# Patient Record
Sex: FEMALE | Race: BLACK OR AFRICAN AMERICAN | NOT HISPANIC OR LATINO | Employment: FULL TIME | URBAN - METROPOLITAN AREA
[De-identification: names, ages, dates, MRNs, and addresses within clinical notes are randomized per-mention and may not be internally consistent; named-entity substitution may affect disease eponyms.]

---

## 2017-01-23 ENCOUNTER — OFFICE VISIT (OUTPATIENT)
Dept: FAMILY MEDICINE | Facility: CLINIC | Age: 27
End: 2017-01-23

## 2017-01-23 VITALS
BODY MASS INDEX: 21.47 KG/M2 | SYSTOLIC BLOOD PRESSURE: 116 MMHG | OXYGEN SATURATION: 100 % | WEIGHT: 129 LBS | DIASTOLIC BLOOD PRESSURE: 78 MMHG | TEMPERATURE: 98.2 F | HEART RATE: 103 BPM

## 2017-01-23 DIAGNOSIS — Z30.42 ENCOUNTER FOR SURVEILLANCE OF INJECTABLE CONTRACEPTIVE: Primary | ICD-10-CM

## 2017-01-23 LAB — HCG UR QL: NEGATIVE

## 2017-01-23 NOTE — PROGRESS NOTES
Quick Note:    Negative UPT from 1/23/2017 were discussed with patient in clinic. Bre Ramachandran MD  ______

## 2017-01-23 NOTE — PROGRESS NOTES
SUBJECTIVE       Antonette Horton is a 26 year old  female with a PMH significant for:     Patient Active Problem List   Diagnosis     Depo-Provera contraceptive status     Recurrent urinary tract infection     Proteinuria     Retroflexion of uterus     She presents to discuss:    Depo shot: Late for Depo. Last Depo was in 2016. No unprotected intercourse in over 2 weeks. She has considered other options for contraception but knows she does not want the pill, Nexplanon, or IUD. She would like to continue the Depo shot for now, but is considering other options for the future. She has noted some spotting while on Depo but is not bothered by this. She has not had issues with weight gain.      For all, if Depo shots given initially or outside 90 days, patient should agree to use other contraception (e.g. condoms) or not have vaginal intercourse for the next 10 days.     FIRST DEPO PROVERA INJECTION  One of the following must apply to initiate Depo   1. Period is regular, and it started 5 or less days ago.  -- Record LMP.  -- UPT required.    2. Elective or spontaneous  5 or less days ago.  -- Record date of .    3. Currently taking OCPs and have been for >1 month without missing any pills.  -- UPT required.    4. Nexplanon is being removed today, and it has been in place for <5 years.    5. Gave birth 5 or less days ago and not breastfeeding.  -- Record date of birth.    6. Gave birth in the past 6 weeks and breastfeeding only (no supplemental feeding).  -- Record date of birth.  -- UPT required.    7. Gave birth or had an elective or spontaneous  less than 4 weeks ago but more than 5 days ago, and have NOT had vaginal intercourse since then.  ------------------------------------------------------------  If NONE of the statements above apply OR the urine pregnancy test is positive, Depo may not be given today.  -- Tell patient to return for first shot within 5 days of start of her next  period.     SUBSEQUENT DEPO PROVERA INJECTIONS   If 90 days or less since last injection     Depo may be given    If more than 90 days since last injection     Patient must have negative UPT  AND     Patient must NOT have had unprotected intercourse in previous 2 weeks.    -- If more than 90 days and unprotected intercourse in past two weeks, then Depo cannot be given.  Advise no unprotected intercourse until returning to clinic in 2 weeks for repeat UPT.       PMH, Medications and Allergies were reviewed and updated as needed.        REVIEW OF SYSTEMS     Pertinent positives and negatives noted in HPI.           OBJECTIVE     Filed Vitals:    01/23/17 1527   BP: 116/78   Pulse: 103   Temp: 98.2  F (36.8  C)   Weight: 129 lb (58.514 kg)   SpO2: 100%     Body mass index is 21.47 kg/(m^2).    General: Alert, NAD, normal weight  Psych: Pleasant mood, normal affect        No results found for this or any previous visit (from the past 24 hour(s)).        ASSESSMENT AND PLAN         Contraception: Patient would like to continue the Depo shot. Late for Depo.  No unprotected intercourse for over 2 weeks and UPT negative today which was discussed, so okay to give Depo today.  Called after clinic to remind patient to avoid unprotected intercourse for 10 days. Reminded to return within the 3 month window for next Depo shot.  Also discussed other options for birth control including pill, patch, Nuvaring, IUDs, and Nexplanon, and she will consider these in the future.   -     HCG Qualitative Urine (UPT)  (P FM)  -     medroxyPROGESTERone (DEPO-PROVERA) injection 150 mg (Charge)  -     INJECTION INTRAMUSCULAR OR SUB-Q      RTC in 3 months for next Depo shot or sooner if develops new or worsening symptoms.    Patient's plan of care was discussed with Dr. Rossi.    Bre Ramachandran MD PGY-3  Pager #: [phone]

## 2017-01-23 NOTE — NURSING NOTE
I administered the following to Antonette Horton.    MEDICATION: Depo Provera 150mg  ROUTE: IM  SITE: RUQ - Gluteus  DOSE: 1   LOT #: [id number]  :  [employer]   EXPIRATION DATE:  04/2020  NDC#: [address]-4  Next Depo: 4/10 - 4/24/17     Was entire vial of medication used? Yes    Name of provider who requested the injection: Dr Bre Ramachandran  Name of provider on site at the time of performing the injection: Dr Flo Ward, [facility]

## 2017-01-25 NOTE — PROGRESS NOTES
Preceptor attestation:  Patient seen and discussed with the resident.  Assessment and plan reviewed with resident and agreed upon.  Supervising physician: Abe Rossi  [facility]

## 2017-01-31 ENCOUNTER — OFFICE VISIT (OUTPATIENT)
Dept: FAMILY MEDICINE | Facility: CLINIC | Age: 27
End: 2017-01-31

## 2017-01-31 VITALS
WEIGHT: 131.4 LBS | BODY MASS INDEX: 21.89 KG/M2 | HEIGHT: 65 IN | HEART RATE: 84 BPM | TEMPERATURE: 98.2 F | DIASTOLIC BLOOD PRESSURE: 80 MMHG | SYSTOLIC BLOOD PRESSURE: 122 MMHG

## 2017-01-31 DIAGNOSIS — N89.8 VAGINAL IRRITATION: Primary | ICD-10-CM

## 2017-01-31 DIAGNOSIS — B37.31 YEAST INFECTION OF THE VAGINA: ICD-10-CM

## 2017-01-31 LAB
BACTERIA: NORMAL
CLUE CELLS: NORMAL
MOTILE TRICHOMONAS: NEGATIVE
ODOR: NORMAL
PH WET PREP: NORMAL
WBC WET PREP: NORMAL
YEAST: PRESENT

## 2017-01-31 RX ORDER — FLUCONAZOLE 150 MG/1
150 TABLET ORAL ONCE
Qty: 1 TABLET | Refills: 0 | Status: SHIPPED | OUTPATIENT
Start: 2017-01-31 | End: 2017-01-31

## 2017-01-31 NOTE — MR AVS SNAPSHOT
After Visit Summary   1/31/2017    Antonette Horton    MRN: [id number]           Patient Information     Date Of Birth          1990        Visit Information        Provider Department      1/31/2017 10:20 AM Neil Peña,  [facility]        Today's Diagnoses     Vaginal irritation    -  1     Yeast infection of the vagina           Care Instructions      Candida Vaginal Infection    You have a Candida vaginal infection. This is also known as a yeast infection. It is most often caused by a type of yeast (fungus) called Candida. Candida are normally found in the vagina. But if they increase in number, this can lead to infection and cause symptoms.  Symptoms of a yeast infection can include:    Clumpy or thin, white discharge, which may look like cottage cheese    Itching or burning    Burning with urination  Certain factors can make a yeast infection more likely. These can include:    Taking certain medicines, such as antibiotics or birth control pills    Pregnancy    Diabetes    Weakened immune system  A yeast infection is most often treated with antifungal medicine. This may be given as a vaginal cream or pills you take by mouth. Treatment may last for about 1 to 7 days. Women with severe or recurrent infections may need longer courses of treatment.  Home care    If you re prescribed medicine, be sure to use it as directed. Finish all of the medicine, even if your symptoms go away. Note: Don t try to treat yourself using over-the-counter products without talking to your provider first. He or she will let you know if this is a good option for you.    Ask your provider what steps you can take to help reduce your risk of having a yeast infection in the future.  Follow-up care  Follow up with your healthcare provider, or as directed.  When to seek medical advice  Call your healthcare provider right away if:    You have a fever of 100.4 F (38 C) or higher, or as directed by your  "provider.    Your symptoms worsen, or they don t go away within a few days of starting treatment.    You have new pain in the lower belly or pelvic region.    You have side effects that bother you or a reaction to the cream or pills you re prescribed.    You or any partners you have sex with have new symptoms, such as a rash, joint pain, or sores.    9869-7508 [employer]. [address] [place] [address], [place] [address]. All rights reserved. This information is not intended as a substitute for professional medical care. Always follow your healthcare professional's instructions.              Follow-ups after your visit        Who to contact     Please call your clinic at [phone] to:    Ask questions about your health    Make or cancel appointments    Discuss your medicines    Learn about your test results    Speak to your doctor   If you have compliments or concerns about an experience at your clinic, or if you wish to file a complaint, please contact [facility] Patient Relations at [phone] or email us at [email or web address]         Additional Information About Your Visit        Care EveryWhere ID     This is your Care EveryWhere ID. This could be used by other organizations to access your [place] medical records  [id number]        Your Vitals Were     Pulse Temperature Height BMI (Body Mass Index)          84 98.2  F (36.8  C) (Oral) 5' 5.35\" (166 cm) 21.63 kg/m2         Blood Pressure from Last 3 Encounters:   01/31/17 122/80   01/23/17 116/78   12/13/16 124/82    Weight from Last 3 Encounters:   01/31/17 131 lb 6.4 oz (59.603 kg)   01/23/17 129 lb (58.514 kg)   12/13/16 126 lb 3.2 oz (57.244 kg)              We Performed the Following     Wet Prep ([facility] FM)          Today's Medication Changes          These changes are accurate as of: 1/31/17 11:14 AM.  If you have any questions, ask your nurse or doctor.               Start taking these medicines.        " Dose/Directions    clotrimazole 2 % Crea   Used for:  Yeast infection of the vagina   Started by:  Neil Peña,         Dose:  1 Application   Place 1 Application vaginally daily for 3 doses   Quantity:  1 Tube   Refills:  0       fluconazole 150 MG tablet   Commonly known as:  DIFLUCAN   Used for:  Yeast infection of the vagina   Started by:  Neil Peña, DO        Dose:  150 mg   Take 1 tablet (150 mg) by mouth once for 1 dose   Quantity:  1 tablet   Refills:  0            Where to get your medicines      These medications were sent to [facility]/pharmacy #[id number] - [place], [place] - [address] AT [facility]  [address] [place] [address]    Hours:  24-hours Phone:  [phone]    - clotrimazole 2 % Crea  - fluconazole 150 MG tablet             Primary Care Provider Office Phone # Fax #    Bre Ramachandran [phone]       [address] [address]        Thank you!     Thank you for choosing [facility]  for your care. Our goal is always to provide you with excellent care. Hearing back from our patients is one way we can continue to improve our services. Please take a few minutes to complete the written survey that you may receive in the mail after your visit with us. Thank you!             Your Updated Medication List - Protect others around you: Learn how to safely use, store and throw away your medicines at www.disposemymeds.org.          This list is accurate as of: 1/31/17 11:14 AM.  Always use your most recent med list.                   Brand Name Dispense Instructions for use    clotrimazole 2 % Crea     1 Tube    Place 1 Application vaginally daily for 3 doses       fluconazole 150 MG tablet    DIFLUCAN    1 tablet    Take 1 tablet (150 mg) by mouth once for 1 dose       hydrocortisone 1 % cream    CORTAID    30 g    Apply sparingly to affected area three times daily for 14 days.       * medroxyPROGESTERone 150  MG/ML injection    DEPO-PROVERA     Inject 150 mg into the muscle every 3 months.       * medroxyPROGESTERone 150 MG/ML injection    DEPO-PROVERA    1 mL    Inject 1 mL (150 mg) into the muscle every 3 months       metroNIDAZOLE 500 MG tablet    FLAGYL    21 tablet    Take 1 tablet (500 mg) by mouth 3 times daily       miconazole 100 MG vaginal suppository    MICATIN    7 suppository    Place 1 suppository (100 mg) vaginally At Bedtime       * Notice:  This list has 2 medication(s) that are the same as other medications prescribed for you. Read the directions carefully, and ask your doctor or other care provider to review them with you.

## 2017-01-31 NOTE — PROGRESS NOTES
"There are no exam notes on file for this visit.  Chief Complaint   Patient presents with     Other     Possible yeast infection and vaginal irritation for about 2 days. No other concerns.     Blood pressure 122/80, pulse 84, temperature 98.2  F (36.8  C), temperature source Oral, height 5' 5.35\" (166 cm), weight 131 lb 6.4 oz (59.603 kg), not currently breastfeeding.    Assessment and Plan   There are no diagnoses linked to this encounter.        (B37.3) Yeast infection of the vagina  Comment: likely secondary to recent antibiotic use for BV  Plan:  - will give lotrimin 2% vaginal cream to use until she can take fluconazole 150 mg one time  - educated on not douching so as to decrease BV infections  - educated on eating yogurt daily to normalize vaginal kameron and decrease risk of BV infections        Options for treatment and follow-up care were reviewed with the patient and/or guardian. Antonette Horton and/or guardian engaged in the decision making process and verbalized understanding of the options discussed and agreed with the final plan.  Patient discussed with Dr. Chappell.   Neil Peña, DO         HPI       Antonette Horton is a 26 year old  female with a Hx of BV and yeas infection presents with vaginal itching.    - vaginal itching  - 3 days  - getting worse  - had before with yeast infection  - had BV/yeast infection in 11/16  - had BV in 12/16  - starting to have some dysuria  - no vaginal discharge  - one male sex partner and has had same partner for years  - does perform douching after intercourse  - no pelvic pain, lesions, suprapubic pain, flank pain, fevers    Patient Active Problem List   Diagnosis     Depo-Provera contraceptive status     Recurrent urinary tract infection     Proteinuria     Retroflexion of uterus     Current Outpatient Prescriptions   Medication Sig Dispense Refill     metroNIDAZOLE (FLAGYL) 500 MG tablet Take 1 tablet (500 mg) by mouth 3 times daily 21 tablet 0     miconazole " "(MICATIN) 100 MG vaginal suppository Place 1 suppository (100 mg) vaginally At Bedtime 7 suppository 0     hydrocortisone (CORTAID) 1 % cream Apply sparingly to affected area three times daily for 14 days. 30 g 0     medroxyPROGESTERone (DEPO-PROVERA) 150 MG/ML injection Inject 1 mL (150 mg) into the muscle every 3 months 1 mL 1     medroxyPROGESTERone (DEPO-PROVERA) 150 MG/ML injection Inject 150 mg into the muscle every 3 months.       Allergies   Allergen Reactions     Bactrim [Sulfamethoxazole W/Trimethoprim] Hives     Family History   Problem Relation Age of Onset     DIABETES Maternal Grandfather      HEART DISEASE No family hx of      CANCER No family hx of      Coronary Artery Disease No family hx of      Hypertension No family hx of      Hyperlipidemia No family hx of      CEREBROVASCULAR DISEASE No family hx of      Breast Cancer No family hx of      Colon Cancer No family hx of      Prostate Cancer No family hx of      Other Cancer No family hx of      Depression No family hx of      Anxiety Disorder No family hx of      MENTAL ILLNESS No family hx of      Substance Abuse No family hx of      Anesthesia Reaction No family hx of      Asthma No family hx of      OSTEOPOROSIS No family hx of      Genetic Disorder No family hx of      Thyroid Disease No family hx of      Obesity No family hx of      Unknown/Adopted No family hx of      No results found for this or any previous visit (from the past 24 hour(s)).         Review of Systems:   As Above             Physical Exam:     Filed Vitals:    01/31/17 1032   BP: 122/80   Pulse: 84   Temp: 98.2  F (36.8  C)   TempSrc: Oral   Height: 5' 5.35\" (166 cm)   Weight: 131 lb 6.4 oz (59.603 kg)     Body mass index is 21.63 kg/(m^2).    : no urethral discharge. Normal external genitalia. Vaginal mucosa looks slightly irritated.   Skin: No inguinal lymphadenopathy appreciated    Office Visit on 01/23/2017   Component Date Value Ref Range Status     HCG Qual Urine " 01/23/2017 NEGATIVE  Negative Final

## 2017-01-31 NOTE — PATIENT INSTRUCTIONS
Candida Vaginal Infection    You have a Candida vaginal infection. This is also known as a yeast infection. It is most often caused by a type of yeast (fungus) called Candida. Candida are normally found in the vagina. But if they increase in number, this can lead to infection and cause symptoms.  Symptoms of a yeast infection can include:    Clumpy or thin, white discharge, which may look like cottage cheese    Itching or burning    Burning with urination  Certain factors can make a yeast infection more likely. These can include:    Taking certain medicines, such as antibiotics or birth control pills    Pregnancy    Diabetes    Weakened immune system  A yeast infection is most often treated with antifungal medicine. This may be given as a vaginal cream or pills you take by mouth. Treatment may last for about 1 to 7 days. Women with severe or recurrent infections may need longer courses of treatment.  Home care    If you re prescribed medicine, be sure to use it as directed. Finish all of the medicine, even if your symptoms go away. Note: Don t try to treat yourself using over-the-counter products without talking to your provider first. He or she will let you know if this is a good option for you.    Ask your provider what steps you can take to help reduce your risk of having a yeast infection in the future.  Follow-up care  Follow up with your healthcare provider, or as directed.  When to seek medical advice  Call your healthcare provider right away if:    You have a fever of 100.4 F (38 C) or higher, or as directed by your provider.    Your symptoms worsen, or they don t go away within a few days of starting treatment.    You have new pain in the lower belly or pelvic region.    You have side effects that bother you or a reaction to the cream or pills you re prescribed.    You or any partners you have sex with have new symptoms, such as a rash, joint pain, or sores.    8438-8647 [employer] [address]  [place] [address]. All rights reserved. This information is not intended as a substitute for professional medical care. Always follow your healthcare professional's instructions.

## 2017-01-31 NOTE — PROGRESS NOTES
Preceptor attestation:  Patient seen and discussed with the resident.  Assessment and plan reviewed with resident and agreed upon.  Supervising physician: Willy Chappell  [facility]

## 2017-02-23 ENCOUNTER — OFFICE VISIT (OUTPATIENT)
Dept: FAMILY MEDICINE | Facility: CLINIC | Age: 27
End: 2017-02-23

## 2017-02-23 VITALS
SYSTOLIC BLOOD PRESSURE: 114 MMHG | WEIGHT: 134 LBS | HEIGHT: 65 IN | DIASTOLIC BLOOD PRESSURE: 72 MMHG | BODY MASS INDEX: 22.33 KG/M2 | TEMPERATURE: 99.2 F | HEART RATE: 85 BPM

## 2017-02-23 DIAGNOSIS — R51.9 NONINTRACTABLE EPISODIC HEADACHE, UNSPECIFIED HEADACHE TYPE: Primary | ICD-10-CM

## 2017-02-23 DIAGNOSIS — M62.830 SPASM OF LUMBAR PARASPINOUS MUSCLE: ICD-10-CM

## 2017-02-23 RX ORDER — HYDROCODONE BITARTRATE AND ACETAMINOPHEN 5; 325 MG/1; MG/1
1-2 TABLET ORAL EVERY 4 HOURS PRN
Qty: 20 TABLET | Refills: 0 | Status: SHIPPED | OUTPATIENT
Start: 2017-02-23 | End: 2017-03-23

## 2017-02-23 RX ORDER — CYCLOBENZAPRINE HCL 10 MG
10 TABLET ORAL 3 TIMES DAILY PRN
Qty: 30 TABLET | Refills: 1 | Status: SHIPPED | OUTPATIENT
Start: 2017-02-23 | End: 2018-08-21

## 2017-02-23 NOTE — LETTER
RETURN TO WORK/SCHOOL FORM    2/23/2017    Re: Antonette Horton  1990      To Whom It May Concern:     Antonette Horton was seen in clinic today.  She may return to work without restrictions on 2/27/17          Restrictions:  None      Prasanth Mcmahon MD  2/23/2017 11:37 AM

## 2017-02-23 NOTE — MR AVS SNAPSHOT
"              After Visit Summary   2/23/2017    Antonette Horton    MRN: [id number]           Patient Information     Date Of Birth          1990        Visit Information        Provider Department      2/23/2017 10:40 AM Prasanth Mcmahon MD [facility]        Today's Diagnoses     Nonintractable episodic headache, unspecified headache type    -  1    Spasm of lumbar paraspinous muscle          Care Instructions    We've given you vicodin for your headaches and low back pain.  And muscle relaxants for your lower back.  Please follow up if the headaches or lower back are not improved within a few days.    Recommend following up if knee pains continue and if heavy periods are linked with back pain, would recommend that get checked out.          Follow-ups after your visit        Follow-up notes from your care team     Return if symptoms worsen or fail to improve.      Who to contact     Please call your clinic at [phone] to:    Ask questions about your health    Make or cancel appointments    Discuss your medicines    Learn about your test results    Speak to your doctor   If you have compliments or concerns about an experience at your clinic, or if you wish to file a complaint, please contact [facility] Patient Relations at [phone] or email us at [email or web address]         Additional Information About Your Visit        Care EveryWhere ID     This is your Care EveryWhere ID. This could be used by other organizations to access your [place] medical records  [id number]        Your Vitals Were     Pulse Temperature Height BMI (Body Mass Index)          85 99.2  F (37.3  C) (Oral) 5' 5\" (165.1 cm) 22.3 kg/m2         Blood Pressure from Last 3 Encounters:   02/23/17 114/72   01/31/17 122/80   01/23/17 116/78    Weight from Last 3 Encounters:   02/23/17 134 lb (60.8 kg)   01/31/17 131 lb 6.4 oz (59.6 kg)   01/23/17 129 lb (58.5 kg)              Today, you had the " following     No orders found for display         Today's Medication Changes          These changes are accurate as of: 2/23/17 11:40 AM.  If you have any questions, ask your nurse or doctor.               Start taking these medicines.        Dose/Directions    cyclobenzaprine 10 MG tablet   Commonly known as:  FLEXERIL   Used for:  Spasm of lumbar paraspinous muscle   Started by:  Prasanth Mcmahon MD        Dose:  10 mg   Take 1 tablet (10 mg) by mouth 3 times daily as needed for muscle spasms   Quantity:  30 tablet   Refills:  1       HYDROcodone-acetaminophen 5-325 MG per tablet   Commonly known as:  NORCO   Used for:  Nonintractable episodic headache, unspecified headache type   Started by:  Prasanth Mcmahon MD        Dose:  1-2 tablet   Take 1-2 tablets by mouth every 4 hours as needed for moderate to severe pain   Quantity:  20 tablet   Refills:  0         Stop taking these medicines if you haven't already. Please contact your care team if you have questions.     hydrocortisone 1 % cream   Commonly known as:  CORTAID   Stopped by:  Prasanth Mcmahon MD           metroNIDAZOLE 500 MG tablet   Commonly known as:  FLAGYL   Stopped by:  Prasanth Mcmahon MD           miconazole 100 MG vaginal suppository   Commonly known as:  MICATIN   Stopped by:  Prasanth Mcmahon MD                Where to get your medicines      These medications were sent to [facility]/pharmacy #[id number] - [place], [place] - [address] AT [facility]  [address] [place] [address]    Hours:  24-hours Phone:  [phone]     cyclobenzaprine 10 MG tablet         Some of these will need a paper prescription and others can be bought over the counter.  Ask your nurse if you have questions.     Bring a paper prescription for each of these medications     HYDROcodone-acetaminophen 5-325 MG per tablet                Primary Care Provider Office Phone # Fax #    Bre Ramachandran [phone]       [address]  [facility] [address]        Thank you!     Thank you for choosing [facility]  for your care. Our goal is always to provide you with excellent care. Hearing back from our patients is one way we can continue to improve our services. Please take a few minutes to complete the written survey that you may receive in the mail after your visit with us. Thank you!             Your Updated Medication List - Protect others around you: Learn how to safely use, store and throw away your medicines at www.disposemymeds.org.          This list is accurate as of: 2/23/17 11:40 AM.  Always use your most recent med list.                   Brand Name Dispense Instructions for use    cyclobenzaprine 10 MG tablet    FLEXERIL    30 tablet    Take 1 tablet (10 mg) by mouth 3 times daily as needed for muscle spasms       HYDROcodone-acetaminophen 5-325 MG per tablet    NORCO    20 tablet    Take 1-2 tablets by mouth every 4 hours as needed for moderate to severe pain       * medroxyPROGESTERone 150 MG/ML injection    DEPO-PROVERA     Inject 150 mg into the muscle every 3 months.       * medroxyPROGESTERone 150 MG/ML injection    DEPO-PROVERA    1 mL    Inject 1 mL (150 mg) into the muscle every 3 months       * Notice:  This list has 2 medication(s) that are the same as other medications prescribed for you. Read the directions carefully, and ask your doctor or other care provider to review them with you.

## 2017-02-23 NOTE — PROGRESS NOTES
SUBJECTIVE:  This is a 26-year-old female who attends for two main reasons:   1.  She describes lower back pain, particularly for the past several days.  She just returned from a trip to [place] where she did a lot of walking.  She also wonders if she didn't sleep on a supportive mattress as she usually would.  In any case, she described pain on the sides of the lower back.  She also noticed some pain around the knees bilaterally, although this doesn't seem necessarily connected with the lower back pain.  She denies injury.  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  She feels that she is unable to work at this time.   2.  She describes a severe bitemporal headache that has been present for a few days.  She doesn't normally have headaches, maybe getting one every few months.  She can't identify obvious triggers such as caffeine use, alcohol use, or stress.  She wears glasses and these are broken, although she says she still wears them.  She finds that holding the forehead and temples seems to provide relief, but she isn't sure if this is due to shielding her eyes from the light.  She notes photophobia, but she denies photophobia.  She isn't nauseated.  No vomiting.  She describes a throbbing sensation, and she also feels unable to work with the severity of the headache, and wanting simply go to bed.      3.  Otherwise, she considers herself in good health.  On review of systems, she used Depo for 5 years.  Normally, at most, she has vaginal spotting or amenorrhea on Depo; however, she is presently experiencing heavy vaginal bleeding, which is unusual for her.  She is sure that she couldn't be pregnant.  She hasn't missed Depo shots.   Past medical history is positive for retroverted uterus.  She didn't follow through with the previously ordered ultrasound of the uterus.   OBJECTIVE:   Vital signs are noted and are satisfactory.  She is in distress, related to both headache and lower  back pain.  Cranial nerve exam is intact.  Pupils are equal and reactive to light.  She is conversational and makes good eye contact.  Lower back exam reveals the site of her pain as the paraspinous muscles in the lumbar region.  There are no gross abnormalities of either knee at this time.   ASSESSMENT:   1.  Headache, atypical migraine vs. muscle contraction headache.   2.  Paraspinous muscle spasm in lumbar area.   3.  Possible contribution of retroverted uterus to lower back pain symptom and associated vaginal bleeding.   PLAN:  We gave her some pain meds, given that she already tried ibuprofen and Tylenol without effect.  I also prescribed a muscle relaxant, indicating that this likely will also be sedating.  I've asked her to return in a few days' time if neither symptom is fully resolved.  I excused her from work for a few days.     Concerning her bilateral knee pain, I've encouraged her to follow up if this remains a bothersome symptom for her.  In addition, it is possible that her retroverted uterus could be causing low back pain. If this continues, I would suggest working this up further.  She is generally satisfied with this plan.   Total visit time today was 25 minutes and all of this was face-to-face MD time.  Over 50% was spent in counseling and coordination of care.

## 2017-02-23 NOTE — PATIENT INSTRUCTIONS
We've given you vicodin for your headaches and low back pain.  And muscle relaxants for your lower back.  Please follow up if the headaches or lower back are not improved within a few days.    Recommend following up if knee pains continue and if heavy periods are linked with back pain, would recommend that get checked out.

## 2017-03-23 ENCOUNTER — OFFICE VISIT (OUTPATIENT)
Dept: FAMILY MEDICINE | Facility: CLINIC | Age: 27
End: 2017-03-23

## 2017-03-23 VITALS
HEART RATE: 94 BPM | HEIGHT: 60 IN | TEMPERATURE: 99.1 F | DIASTOLIC BLOOD PRESSURE: 73 MMHG | WEIGHT: 128.6 LBS | BODY MASS INDEX: 25.25 KG/M2 | SYSTOLIC BLOOD PRESSURE: 116 MMHG

## 2017-03-23 DIAGNOSIS — J06.9 VIRAL UPPER RESPIRATORY TRACT INFECTION: Primary | ICD-10-CM

## 2017-03-23 DIAGNOSIS — R51.9 NONINTRACTABLE EPISODIC HEADACHE, UNSPECIFIED HEADACHE TYPE: ICD-10-CM

## 2017-03-23 RX ORDER — THERMOMETER, ELECTRONIC,ORAL
1 EACH MISCELLANEOUS DAILY PRN
Qty: 1 EACH | Refills: 0 | Status: SHIPPED | OUTPATIENT
Start: 2017-03-23 | End: 2018-08-21

## 2017-03-23 RX ORDER — SUMATRIPTAN 50 MG/1
50 TABLET, FILM COATED ORAL
Qty: 16 TABLET | Refills: 1 | Status: SHIPPED | OUTPATIENT
Start: 2017-03-23 | End: 2021-06-28

## 2017-03-23 RX ORDER — HYDROCODONE BITARTRATE AND ACETAMINOPHEN 5; 325 MG/1; MG/1
1-2 TABLET ORAL EVERY 4 HOURS PRN
Qty: 20 TABLET | Refills: 0 | Status: SHIPPED | OUTPATIENT
Start: 2017-03-23 | End: 2018-08-21
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
## 2017-03-23 NOTE — LETTER
RETURN TO WORK/SCHOOL FORM    3/23/2017    Re: Antonette Horton  1990      To Whom It May Concern:     Antonette Horton was seen in clinic today with acute migraine.  She may return to work without restrictions on 3/24/17 assuming headache is resolved.          Restrictions:  None      Prasanth Mcmahon MD  3/23/2017 11:10 AM

## 2017-03-23 NOTE — PATIENT INSTRUCTIONS
Take one sumatriptan 50mgs and rest.  If headache is still there in 1 hour, repeat the dose.  If the headache goes away with this treatment, then this is migraine.      If the headache is still there, take 1-2 vicodin.   What Are Migraine and Tension (muscle contraction) Headaches?  Although there are several types of headaches, migraine and tension headaches affect the most people. When you have a headache, it isn't your brain that's hurting. Your head aches because nerves in the bones, blood vessels, meninges, and muscles of your head are irritated. These irritated nerves send pain signals to the brain, which identifies where you hurt and how bad the pain is.  Talk with your healthcare provider about a treatment plan that may help relieve pain and prevent future headaches.     What causes your headache?  The actual headache process is not yet understood. Only rarely are headaches a sign of a serious medical problem. Headache pain may be caused by abnormal interaction between the brain and the nerves and blood vessels in the head. Environmental stresses or certain foods and drinks may trigger headache pain.  What is referred pain?  Headache pain can be referred pain, which is pain that has its source in one place but is felt in another. For example, pain behind the eyes may actually be caused by tense muscles in the neck and shoulders. This means that the place that hurts may not be the part of the body that needs treatment.  Is it a migraine?  Migraine is a vascular headache that causes throbbing pain felt on one or both sides of the head. You may feel nauseated or vomit. This headache may also be preceded or associated with changes in sight (like seeing spots or flashes of light), ability to speak, or sensation (aura). There are a wide variety of environmental and food-related triggers for migraines. The pain may last for 4 to 72 hours. Afterward, you may feel shaky for a day or so. If this is the first time you  "experience these symptoms, you should immediately seek medical attention because you could be having a stroke.  Is it a tension headache?  This type of headache is usually a dull ache or a sensation of pressure on both sides of the head. It may be associated with pain or tension in the neck and shoulders. Depression, anxiety, and stress can cause a tension headache. The pain may not have a definite beginning or end. It may come and go, or seem never to go away.  When to call the healthcare provider  Call your healthcare provider for headaches that happen along with any of these symptoms:    Sudden, severe headache that is different from your usual headache pain    High fever along with a stiff neck    Recurring headache in children     Ongoing numbness or muscle weakness    Loss of vision    Pain following a head injury    Convulsions, or a change in mental awareness    A headache you would call \"the worst headache you've ever had\"     1636-9311 [employer]. [address] [place] [address], [place] [address]. All rights reserved. This information is not intended as a substitute for professional medical care. Always follow your healthcare professional's instructions.        "

## 2017-03-23 NOTE — MR AVS SNAPSHOT
After Visit Summary   3/23/2017    Antonette Horton    MRN: [id number]           Patient Information     Date Of Birth          1990        Visit Information        Provider Department      3/23/2017 10:20 AM Prasanth Mcmahon MD [facility]        Today's Diagnoses     Nonintractable episodic headache, unspecified headache type          Care Instructions    Take one sumatriptan 50mgs and rest.  If headache is still there in 1 hour, repeat the dose.  If the headache goes away with this treatment, then this is migraine.      If the headache is still there, take 1-2 vicodin.   What Are Migraine and Tension (muscle contraction) Headaches?  Although there are several types of headaches, migraine and tension headaches affect the most people. When you have a headache, it isn't your brain that's hurting. Your head aches because nerves in the bones, blood vessels, meninges, and muscles of your head are irritated. These irritated nerves send pain signals to the brain, which identifies where you hurt and how bad the pain is.  Talk with your healthcare provider about a treatment plan that may help relieve pain and prevent future headaches.     What causes your headache?  The actual headache process is not yet understood. Only rarely are headaches a sign of a serious medical problem. Headache pain may be caused by abnormal interaction between the brain and the nerves and blood vessels in the head. Environmental stresses or certain foods and drinks may trigger headache pain.  What is referred pain?  Headache pain can be referred pain, which is pain that has its source in one place but is felt in another. For example, pain behind the eyes may actually be caused by tense muscles in the neck and shoulders. This means that the place that hurts may not be the part of the body that needs treatment.  Is it a migraine?  Migraine is a vascular headache that causes throbbing pain felt on one or both sides of the head. You  "may feel nauseated or vomit. This headache may also be preceded or associated with changes in sight (like seeing spots or flashes of light), ability to speak, or sensation (aura). There are a wide variety of environmental and food-related triggers for migraines. The pain may last for 4 to 72 hours. Afterward, you may feel shaky for a day or so. If this is the first time you experience these symptoms, you should immediately seek medical attention because you could be having a stroke.  Is it a tension headache?  This type of headache is usually a dull ache or a sensation of pressure on both sides of the head. It may be associated with pain or tension in the neck and shoulders. Depression, anxiety, and stress can cause a tension headache. The pain may not have a definite beginning or end. It may come and go, or seem never to go away.  When to call the healthcare provider  Call your healthcare provider for headaches that happen along with any of these symptoms:    Sudden, severe headache that is different from your usual headache pain    High fever along with a stiff neck    Recurring headache in children     Ongoing numbness or muscle weakness    Loss of vision    Pain following a head injury    Convulsions, or a change in mental awareness    A headache you would call \"the worst headache you've ever had\"     6156-0198 [employer]. [address] [place] [address], [place] [address]. All rights reserved. This information is not intended as a substitute for professional medical care. Always follow your healthcare professional's instructions.              Follow-ups after your visit        Who to contact     Please call your clinic at [phone] to:    Ask questions about your health    Make or cancel appointments    Discuss your medicines    Learn about your test results    Speak to your doctor   If you have compliments or concerns about an experience at your clinic, or if you wish to file a complaint, please " contact [facility] Patient Relations at [phone] or email us at [email or web address]         Additional Information About Your Visit        Care EveryWhere ID     This is your Care EveryWhere ID. This could be used by other organizations to access your [place] medical records  [id number]        Your Vitals Were     Pulse Temperature Height BMI (Body Mass Index)          94 99.1  F (37.3  C) (Oral) 5' (152.4 cm) 25.12 kg/m2         Blood Pressure from Last 3 Encounters:   03/23/17 116/73   02/23/17 114/72   01/31/17 122/80    Weight from Last 3 Encounters:   03/23/17 128 lb 9.6 oz (58.3 kg)   02/23/17 134 lb (60.8 kg)   01/31/17 131 lb 6.4 oz (59.6 kg)              Today, you had the following     No orders found for display         Today's Medication Changes          These changes are accurate as of: 3/23/17 11:16 AM.  If you have any questions, ask your nurse or doctor.               Start taking these medicines.        Dose/Directions    SUMAtriptan 50 MG tablet   Commonly known as:  IMITREX   Used for:  Nonintractable episodic headache, unspecified headache type   Started by:  Prasanth Mcmahon MD        Dose:  50 mg   Take 1 tablet (50 mg) by mouth at onset of headache for migraine May repeat in 2 hours. Max 4 tablets/24 hours.   Quantity:  16 tablet   Refills:  1            Where to get your medicines      These medications were sent to [facility]/pharmacy #[id number] - [place], [place] - [address] AT [facility]  [address] [place] [address]    Hours:  24-hours Phone:  [phone]     SUMAtriptan 50 MG tablet         Some of these will need a paper prescription and others can be bought over the counter.  Ask your nurse if you have questions.     Bring a paper prescription for each of these medications     HYDROcodone-acetaminophen 5-325 MG per tablet                Primary Care Provider Office Phone # Fax #    Prasanth Mcmahon [phone]        [address] [address]        Thank you!     Thank you for choosing [facility]  for your care. Our goal is always to provide you with excellent care. Hearing back from our patients is one way we can continue to improve our services. Please take a few minutes to complete the written survey that you may receive in the mail after your visit with us. Thank you!             Your Updated Medication List - Protect others around you: Learn how to safely use, store and throw away your medicines at www.disposemymeds.org.          This list is accurate as of: 3/23/17 11:16 AM.  Always use your most recent med list.                   Brand Name Dispense Instructions for use    cyclobenzaprine 10 MG tablet    FLEXERIL    30 tablet    Take 1 tablet (10 mg) by mouth 3 times daily as needed for muscle spasms       HYDROcodone-acetaminophen 5-325 MG per tablet    NORCO    20 tablet    Take 1-2 tablets by mouth every 4 hours as needed for moderate to severe pain       * medroxyPROGESTERone 150 MG/ML injection    DEPO-PROVERA     Inject 150 mg into the muscle every 3 months.       * medroxyPROGESTERone 150 MG/ML injection    DEPO-PROVERA    1 mL    Inject 1 mL (150 mg) into the muscle every 3 months       SUMAtriptan 50 MG tablet    IMITREX    16 tablet    Take 1 tablet (50 mg) by mouth at onset of headache for migraine May repeat in 2 hours. Max 4 tablets/24 hours.       * Notice:  This list has 2 medication(s) that are the same as other medications prescribed for you. Read the directions carefully, and ask your doctor or other care provider to review them with you.

## 2017-05-02 ENCOUNTER — TELEPHONE (OUTPATIENT)
Dept: FAMILY MEDICINE | Facility: CLINIC | Age: 27
End: 2017-05-02

## 2017-05-02 NOTE — TELEPHONE ENCOUNTER
Agree with assessment and plan.  Will see patient tomorrow morning in clinic.    Zulema Del Cid MD ([place]) PGY-2  [facility]  5/2/2017

## 2017-05-02 NOTE — TELEPHONE ENCOUNTER
Patient states she has been on depo for 5 years but she is late in getting her depo. She woke up this morning with heavy bleeding which is different for her. States she has been late before and she would usually have some spotting but today it's like a heavy period. She was concerned and was wondering what could be going on. She states this morning was was feeling lightheaded but she took a shower and has been drinking a lot of water and now she feels better. Nurse informed the patient that any off balance of her hormones whether and increase or decrease could cause some abnormal bleeding with the depo. Nurse advised the patient to come in to be seen today for evaluation but she states not able to come in today because of work but wants to schedule an appt for tomorrow morning. Nurse advised the patient to go to the Er if her symptoms worsen, if she bleeds through a tampon an hour, dizziness or if she begins to fill week. Pt verbalizes understanding of the directions and information. Gave pt opportunity to ask additional questions or address concerns. Pt is directed to call back if condition worsens, new symptoms develop, or there is no improvement. /YASMEEN Ugalde    Routed to Dr. Del Cid

## 2017-05-02 NOTE — TELEPHONE ENCOUNTER
[facility] phone call message- general phone call:    Reason for call: She needs a call back re some concerns re the depo(she is currently on).    Return call needed: Yes    OK to leave a message on voice mail? Yes    Primary language: English      needed? No    Call taken on May 2, 2017 at 8:07 AM by Flaco Head

## 2017-05-11 ENCOUNTER — OFFICE VISIT (OUTPATIENT)
Dept: FAMILY MEDICINE | Facility: CLINIC | Age: 27
End: 2017-05-11

## 2017-05-11 VITALS
TEMPERATURE: 98.6 F | HEART RATE: 85 BPM | BODY MASS INDEX: 25.58 KG/M2 | WEIGHT: 131 LBS | OXYGEN SATURATION: 99 % | SYSTOLIC BLOOD PRESSURE: 106 MMHG | DIASTOLIC BLOOD PRESSURE: 67 MMHG

## 2017-05-11 DIAGNOSIS — Z32.00 PREGNANCY EXAMINATION OR TEST, PREGNANCY UNCONFIRMED: Primary | ICD-10-CM

## 2017-05-11 LAB — HCG UR QL: NEGATIVE

## 2017-05-11 NOTE — NURSING NOTE
The following medication was given:     MEDICATION: Depo Provera 150mg  ROUTE: IM  [facility]  DOSE: 1   LOT #: [id number]  :  [employer]   EXPIRATION DATE:  12/2020  NDC#: [address]-6   Medication administered by: JARRELL Padron Depo due: 7/27/17-8/10/17  Dr. Joanna Monroe was available on site at the time of this service.

## 2017-05-11 NOTE — MR AVS SNAPSHOT
After Visit Summary   2017    Antonette Horton    MRN: [id number]           Patient Information     Date Of Birth          1990        Visit Information        Provider Department      2017 4:10 PM Joanna Monroe MD [facility]        Today's Diagnoses     Pregnancy examination or test, pregnancy unconfirmed    -  1       Follow-ups after your visit        Follow-up notes from your care team     Return in about 3 months (around 2017).      Who to contact     Please call your clinic at [phone] to:    Ask questions about your health    Make or cancel appointments    Discuss your medicines    Learn about your test results    Speak to your doctor   If you have compliments or concerns about an experience at your clinic, or if you wish to file a complaint, please contact [facility] Patient Relations at [phone] or email us at [email or web address]         Additional Information About Your Visit        MyChart Information     [employer] is an electronic gateway that provides easy, online access to your medical records. With [employer], you can request a clinic appointment, read your test results, renew a prescription or communicate with your care team.     To sign up for [employer] visit the website at [email or web address]   You will be asked to enter the access code listed below, as well as some personal information. Please follow the directions to create your username and password.     Your access code is: [email or web address]  Expires: 2017  8:51 PM     Your access code will  in 90 days. If you need help or a new code, please contact your [facility] or call [phone] for assistance.        Care EveryWhere ID     This is your Care EveryWhere ID. This could be used by other organizations to access your [place] medical records  [id number]        Your Vitals Were     Pulse Temperature Pulse Oximetry  Breastfeeding? BMI (Body Mass Index)       85 98.6  F (37  C) 99% No 25.58 kg/m2        Blood Pressure from Last 3 Encounters:   05/11/17 106/67   03/23/17 116/73   02/23/17 114/72    Weight from Last 3 Encounters:   05/11/17 131 lb (59.4 kg)   03/23/17 128 lb 9.6 oz (58.3 kg)   02/23/17 134 lb (60.8 kg)              We Performed the Following     HCG Qualitative Urine (UPT)  (P )        Primary Care Provider Office Phone # Fax #    Prasanth Mcmahon [phone]       [address] [address]        Thank you!     Thank you for choosing [facility]  for your care. Our goal is always to provide you with excellent care. Hearing back from our patients is one way we can continue to improve our services. Please take a few minutes to complete the written survey that you may receive in the mail after your visit with us. Thank you!             Your Updated Medication List - Protect others around you: Learn how to safely use, store and throw away your medicines at www.disposemymeds.org.          This list is accurate as of: 5/11/17  8:51 PM.  Always use your most recent med list.                   Brand Name Dispense Instructions for use    cyclobenzaprine 10 MG tablet    FLEXERIL    30 tablet    Take 1 tablet (10 mg) by mouth 3 times daily as needed for muscle spasms       Digital Thermometer/Beeper Misc     1 each    1 Device daily as needed       HYDROcodone-acetaminophen 5-325 MG per tablet    NORCO    20 tablet    Take 1-2 tablets by mouth every 4 hours as needed for moderate to severe pain       * medroxyPROGESTERone 150 MG/ML injection    DEPO-PROVERA     Inject 150 mg into the muscle every 3 months.       * medroxyPROGESTERone 150 MG/ML injection    DEPO-PROVERA    1 mL    Inject 1 mL (150 mg) into the muscle every 3 months       SUMAtriptan 50 MG tablet    IMITREX    16 tablet    Take 1 tablet (50 mg) by mouth at onset of headache for migraine May repeat in 2 hours.  Max 4 tablets/24 hours.       * Notice:  This list has 2 medication(s) that are the same as other medications prescribed for you. Read the directions carefully, and ask your doctor or other care provider to review them with you.

## 2017-05-12 NOTE — PROGRESS NOTES
Nursing Notes:   Parul Ward CMA  5/11/2017  5:06 PM  Signed  The following medication was given:     MEDICATION: Depo Provera 150mg  ROUTE: IM  SITE: LUQ - Gluteus  DOSE: 1   LOT #: [id number]  :  [employer]   EXPIRATION DATE:  12/2020  NDC#: [address]-4   Medication administered by: Parul Ward CMA  Next Depo due: 7/27/17-8/10/17  Dr. Joanna Monroe was available on site at the time of this service.       SUBJECTIVE  Antonette Horton is a 26 year old female with past medical history significant for    Patient Active Problem List   Diagnosis     Depo-Provera contraceptive status     Retroflexion of uterus     Others present at the visit:  Patient's two children    Presents for   Chief Complaint   Patient presents with     Contraception     late Depo, would like to discuss contraception options     Late for depo shot.  Has been on depo for 6 years.  Periods were heavy, before depo, and now are light.  Pregnancy test is negative.  She reports no sexual intercourse for the last 1 month.  She is angry with having to wait today and does not want to answer many of my questions.      OBJECTIVE:  Vitals: /67  Pulse 85  Temp 98.6  F (37  C)  Wt 131 lb (59.4 kg)  SpO2 99%  Breastfeeding? No  BMI 25.58 kg/m2  BMI= Body mass index is 25.58 kg/(m^2).  Gen:  Comfortable, NAD.  Room smells strongly of marijuana  Psyche:  Irritated and irritable.    No further exam completed.      Results for orders placed or performed in visit on 05/11/17   HCG Qualitative Urine (UPT)  ([facility])   Result Value Ref Range    HCG Qual Urine NEGATIVE Negative       ASSESSMENT AND PLAN:      Antonette was seen today for contraception.  Denies any sexual intercourse for the past 1 month.  We discussed that typical protocol is to check UPT today have patient refrain from intercourse for 2 weeks.  Given that patient denies intercourse for past 1 month, and test is negative, I will approve giving depo shot today.  Shared the risks  of birth defects, fetal abnormalities if patient is pregnant.  She reaffirmed that she has not been sexually active, and that she understands the risks.  Will order depo.      Diagnoses and all orders for this visit:    Pregnancy examination or test, pregnancy unconfirmed  -     HCG Qualitative Urine (UPT)  ([facility])    Joanna Monroe

## 2017-07-21 ENCOUNTER — OFFICE VISIT (OUTPATIENT)
Dept: FAMILY MEDICINE | Facility: CLINIC | Age: 27
End: 2017-07-21

## 2017-07-21 VITALS
DIASTOLIC BLOOD PRESSURE: 79 MMHG | BODY MASS INDEX: 19.39 KG/M2 | TEMPERATURE: 98.2 F | SYSTOLIC BLOOD PRESSURE: 118 MMHG | OXYGEN SATURATION: 99 % | WEIGHT: 116.4 LBS | HEART RATE: 64 BPM | HEIGHT: 65 IN

## 2017-07-21 DIAGNOSIS — G47.00 INSOMNIA, UNSPECIFIED TYPE: ICD-10-CM

## 2017-07-21 DIAGNOSIS — R63.4 LOSS OF WEIGHT: ICD-10-CM

## 2017-07-21 DIAGNOSIS — R11.2 NAUSEA AND VOMITING, INTRACTABILITY OF VOMITING NOT SPECIFIED, UNSPECIFIED VOMITING TYPE: Primary | ICD-10-CM

## 2017-07-21 LAB
ANION GAP SERPL CALCULATED.3IONS-SCNC: 11 MMOL/L (ref 5–18)
B-HCG SERPL-ACNC: <2 MLU/ML (ref 0–4)
BASOPHILS # BLD AUTO: 0 THOU/UL (ref 0–0.2)
BASOPHILS NFR BLD AUTO: 0 % (ref 0–2)
BUN SERPL-MCNC: 11 MG/DL (ref 8–22)
CALCIUM SERPL-MCNC: 9.8 MG/DL (ref 8.5–10.5)
CHLORIDE SERPL-SCNC: 106 MMOL/L (ref 98–107)
CO2 SERPL-SCNC: 22 MMOL/L (ref 22–31)
CREAT SERPL-MCNC: 0.91 MG/DL (ref 0.6–1.1)
CRP SERPL-MCNC: <0.1 MG/DL (ref 0–0.8)
EOSINOPHIL # BLD AUTO: 0.1 THOU/UL (ref 0–0.4)
EOSINOPHIL NFR BLD AUTO: 1 % (ref 0–6)
ERYTHROCYTE [DISTWIDTH] IN BLOOD BY AUTOMATED COUNT: 13.4 % (ref 11–14.5)
ERYTHROCYTE [SEDIMENTATION RATE] IN BLOOD: 2 MM/HR (ref 0–20)
GLUCOSE SERPL-MCNC: 89 MG/DL (ref 70–125)
HCT VFR BLD AUTO: 39.6 % (ref 35–47)
HGB BLD-MCNC: 12.7 G/DL (ref 12–16)
LYMPHOCYTES # BLD AUTO: 2.5 THOU/UL (ref 0.8–4.4)
LYMPHOCYTES NFR BLD AUTO: 37 % (ref 20–40)
MCH RBC QN AUTO: 23.3 PG (ref 27–34)
MCHC RBC AUTO-ENTMCNC: 32.1 G/DL (ref 32–36)
MCV RBC AUTO: 73 FL (ref 80–100)
MONOCYTES # BLD AUTO: 0.5 THOU/UL (ref 0–0.9)
MONOCYTES NFR BLD AUTO: 8 % (ref 2–10)
NEUTROPHILS # BLD AUTO: 3.6 THOU/UL (ref 2–7.7)
NEUTROPHILS NFR BLD AUTO: 53 % (ref 50–70)
PLATELET # BLD AUTO: 265 THOU/UL (ref 140–440)
PMV BLD AUTO: 13.7 FL (ref 8.5–12.5)
POTASSIUM SERPL-SCNC: 4 MMOL/L (ref 3.5–5)
RBC # BLD AUTO: 5.44 MILL/UL (ref 3.8–5.4)
SODIUM SERPL-SCNC: 139 MMOL/L (ref 136–145)
TSH SERPL DL<=0.05 MIU/L-ACNC: 2.16 UIU/ML (ref 0.3–5)
WBC # BLD AUTO: 6.7 THOU/UL (ref 4–11)

## 2017-07-21 RX ORDER — HYDROXYZINE HYDROCHLORIDE 25 MG/1
50 TABLET, FILM COATED ORAL
Qty: 90 TABLET | Refills: 1 | Status: SHIPPED | OUTPATIENT
Start: 2017-07-21 | End: 2018-08-21

## 2017-07-21 RX ORDER — ONDANSETRON 4 MG/1
4-8 TABLET, ORALLY DISINTEGRATING ORAL EVERY 8 HOURS PRN
Qty: 20 TABLET | Refills: 1 | Status: SHIPPED | OUTPATIENT
Start: 2017-07-21 | End: 2018-08-21

## 2017-07-21 NOTE — MR AVS SNAPSHOT
After Visit Summary   2017    Antonette Horton    MRN: [id number]           Patient Information     Date Of Birth          1990        Visit Information        Provider Department      2017 4:30 PM Dawn Garcia MD [facility]        Today's Diagnoses     Nausea and vomiting, intractability of vomiting not specified, unspecified vomiting type    -  1    Loss of weight        Insomnia, unspecified type           Follow-ups after your visit        Who to contact     Please call your clinic at [phone] to:    Ask questions about your health    Make or cancel appointments    Discuss your medicines    Learn about your test results    Speak to your doctor   If you have compliments or concerns about an experience at your clinic, or if you wish to file a complaint, please contact [facility] Patient Relations at [phone] or email us at [email or web address]         Additional Information About Your Visit        MyChart Information     [employer] is an electronic gateway that provides easy, online access to your medical records. With N(i)Â², you can request a clinic appointment, read your test results, renew a prescription or communicate with your care team.     To sign up for [employer] visit the website at [email or web address]   You will be asked to enter the access code listed below, as well as some personal information. Please follow the directions to create your username and password.     Your access code is: [email or web address]  Expires: 2017  8:51 PM     Your access code will  in 90 days. If you need help or a new code, please contact your [facility] Clinic or call [phone] for assistance.        Care EveryWhere ID     This is your Care EveryWhere ID. This could be used by other organizations to access your [facility] medical records  [id number]        Your Vitals Were     Pulse Temperature Height Pulse  "Oximetry BMI (Body Mass Index)       64 98.2  F (36.8  C) (Oral) 5' 4.75\" (164.5 cm) 99% 19.52 kg/m2        Blood Pressure from Last 3 Encounters:   07/21/17 118/79   05/11/17 106/67   03/23/17 116/73    Weight from Last 3 Encounters:   07/21/17 116 lb 6.4 oz (52.8 kg)   05/11/17 131 lb (59.4 kg)   03/23/17 128 lb 9.6 oz (58.3 kg)              We Performed the Following     Basic Metabolic Profile ([facility]) - Results > 1 hr     Beta-HCG Quantitative ([facility])     C-Reactive Protein ([facility])     CBC w/ Diff and Plt ([facility])     Erythrocyte Sed Rate ([facility])     HCG qualitative     Thyroid [place] ([facility])          Today's Medication Changes          These changes are accurate as of: 7/21/17 11:59 PM.  If you have any questions, ask your nurse or doctor.               Start taking these medicines.        Dose/Directions    hydrOXYzine 25 MG tablet   Commonly known as:  ATARAX   Used for:  Nausea and vomiting, intractability of vomiting not specified, unspecified vomiting type, Insomnia, unspecified type   Started by:  Dawn Garcia MD        Dose:  50 mg   Take 2 tablets (50 mg) by mouth nightly as needed for anxiety   Quantity:  90 tablet   Refills:  1       ondansetron 4 MG ODT tab   Commonly known as:  ZOFRAN ODT   Used for:  Nausea and vomiting, intractability of vomiting not specified, unspecified vomiting type   Started by:  Dawn Garcia MD        Dose:  4-8 mg   Take 1-2 tablets (4-8 mg) by mouth every 8 hours as needed for nausea   Quantity:  20 tablet   Refills:  1         These medicines have changed or have updated prescriptions.        Dose/Directions    medroxyPROGESTERone 150 MG/ML injection   Commonly known as:  DEPO-PROVERA   This may have changed:  Another medication with the same name was removed. Continue taking this medication, and follow the directions you see here.   Used for:  Encounter for surveillance of injectable contraceptive   Changed by:  " José Luis Kingsley MD        Dose:  150 mg   Inject 1 mL (150 mg) into the muscle every 3 months   Quantity:  1 mL   Refills:  1            Where to get your medicines      These medications were sent to [facility]/pharmacy #[id number] - [place], [place] - [address] AT [facility]  [address] [place] [address]    Hours:  24-hours Phone:  [phone]     hydrOXYzine 25 MG tablet    ondansetron 4 MG ODT tab                Primary Care Provider Office Phone # Fax #    Prasanth Mcmahon [phone]       [facility] [address] [address]        Equal Access to Services     [facility] LISETTE : Hadii vivien hutchins hadasho Somyaali, waaxda luqadaha, qaybta kaalmada adedanayada, alin cuellar . So [facility] [phone].    ATENCIÓN: Si habla español, tiene a kyle disposición servicios gratuitos de asistencia lingüística. [facility] [phone].    We comply with applicable federal civil rights laws and [place] laws. We do not discriminate on the basis of race, color, national origin, age, disability sex, sexual orientation or gender identity.            Thank you!     Thank you for choosing [facility]  for your care. Our goal is always to provide you with excellent care. Hearing back from our patients is one way we can continue to improve our services. Please take a few minutes to complete the written survey that you may receive in the mail after your visit with us. Thank you!             Your Updated Medication List - Protect others around you: Learn how to safely use, store and throw away your medicines at www.disposemymeds.org.          This list is accurate as of: 7/21/17 11:59 PM.  Always use your most recent med list.                   Brand Name Dispense Instructions for use Diagnosis    cyclobenzaprine 10 MG tablet    FLEXERIL    30 tablet    Take 1 tablet (10 mg) by mouth 3 times daily as needed for muscle spasms    Spasm of lumbar paraspinous  muscle       Digital Thermometer/Beeper Misc     1 each    1 Device daily as needed    Viral upper respiratory tract infection       HYDROcodone-acetaminophen 5-325 MG per tablet    NORCO    20 tablet    Take 1-2 tablets by mouth every 4 hours as needed for moderate to severe pain    Nonintractable episodic headache, unspecified headache type       hydrOXYzine 25 MG tablet    ATARAX    90 tablet    Take 2 tablets (50 mg) by mouth nightly as needed for anxiety    Nausea and vomiting, intractability of vomiting not specified, unspecified vomiting type, Insomnia, unspecified type       medroxyPROGESTERone 150 MG/ML injection    DEPO-PROVERA    1 mL    Inject 1 mL (150 mg) into the muscle every 3 months    Encounter for surveillance of injectable contraceptive       ondansetron 4 MG ODT tab    ZOFRAN ODT    20 tablet    Take 1-2 tablets (4-8 mg) by mouth every 8 hours as needed for nausea    Nausea and vomiting, intractability of vomiting not specified, unspecified vomiting type       SUMAtriptan 50 MG tablet    IMITREX    16 tablet    Take 1 tablet (50 mg) by mouth at onset of headache for migraine May repeat in 2 hours. Max 4 tablets/24 hours.    Nonintractable episodic headache, unspecified headache type

## 2017-07-21 NOTE — PROGRESS NOTES
Preceptor attestation:  Patient seen and discussed with the resident. Assessment and plan reviewed with resident and agreed upon.  Supervising physician: Willy Chappell  [facility]

## 2017-07-21 NOTE — LETTER
July 25, 2017      Antonette Horton  [address]   [place] [address]        Dear Antonette,  The results from your blood work are back and are all normal. Your electrolytes and kidney function was normal. Your blood cell levels were normal. Your thyroid function was normal. You have no signs of chronic inflammation.     Please return to clinic for a follow up visit to further investigate the cause of your weight loss and nausea and vomiting.   Please see below for your test results.    Resulted Orders   Thyroid [place] ([facility])   Result Value Ref Range    TSH 2.16 0.30 - 5.00 uIU/mL    Narrative    Test performed by:  [facility] [address] [place] [address]   C-Reactive Protein ([facility])   Result Value Ref Range    C-Reactive Protein <0.1 0.0 - 0.8 mg/dL    Narrative    Test performed by:  [facility] [address] [place] [address]   Basic Metabolic Profile ([facility]) - Results > 1 hr   Result Value Ref Range    Sodium 139 136 - 145 mmol/L    Potassium 4.0 3.5 - 5.0 mmol/L    Chloride 106 98 - 107 mmol/L    CO2, Total 22 22 - 31 mmol/L    Anion Gap 11 5 - 18 mmol/L    Glucose 89 70 - 125 mg/dL    Calcium 9.8 8.5 - 10.5 mg/dL    Urea Nitrogen 11 8 - 22 mg/dL    Creatinine 0.91 0.60 - 1.10 mg/dL    GFR Estimate If Black >60 >60 mL/min/1.73m2    GFR Estimate >60 >60 mL/min/1.73m2    Narrative    Test performed by:  [facility] [address] [place] [address]  Fasting Glucose reference range is 70-99 mg/dL per  American Diabetes Association (ADA) guidelines.   CBC w/ Diff and Plt ([facility])   Result Value Ref Range    WBC 6.7 4.0 - 11.0 thou/uL    RBC 5.44 (H) 3.80 - 5.40 mill/uL    Hemoglobin 12.7 12.0 - 16.0 g/dL    Hematocrit 39.6 35.0 - 47.0 %    MCV 73 (L) 80 - 100 fL    MCH 23.3 (L) 27.0 - 34.0 pg    MCHC 32.1 32.0 - 36.0 g/dL    RDW 13.4 11.0 - 14.5 %    Platelets 265 140 - 440 thou/uL    Mean Platelet Volume 13.7 (H) 8.5 - 12.5 fL    %  Neutrophils 53 50 - 70 %    % Lymphocytes 37 20 - 40 %    % Monocytes 8 2 - 10 %    % Eosinophils 1 0 - 6 %    % Basophils 0 0 - 2 %    Neutrophils (Absolute) 3.6 2.0 - 7.7 thou/uL    Lymphs (Absolute) 2.5 0.8 - 4.4 thou/uL    Monocytes(Absolute) 0.5 0.0 - 0.9 thou/uL    Eos (Absolute) 0.1 0.0 - 0.4 thou/uL    Baso (Absolute) 0.0 0.0 - 0.2 thou/uL    Narrative    Test performed by:  [facility]  [address] [place] [address]   Erythrocyte Sed Rate ([facility])   Result Value Ref Range    Sed Rate 2 0 - 20 mm/hr    Narrative    Test performed by:  [facility] [address] [place] [address]   Beta-HCG Quantitative ([facility])   Result Value Ref Range    Beta hCG, Quantitative <2 0 - 4 mlU/mL    Narrative    Test performed by:  [facility] [address] [place] [address]  Non-pregnant female . . . . . . . . . . . . . 0-4 (<5) mIU/mL  Equivocal result for early pregnancy . . . . 5-24 mIU/mL  Pregnant Female:  -------------------------------------------------------------  Weeks Post LMP Approximate HCG range(mIU/mL)  (Last Menstrual Period)range  -------------------------------------------------------------  3-4 Weeks . . . . . . . 9- 130 mIU/mL  4-5 Weeks . . . . . . . 75- 2,600 mIU/mL  5-6 Weeks . . . . . . 850- 20,800 mIU/mL  6-7 Weeks . . . . . 4,000-100,200 mIU/mL  7-12 Weeks . . . . . 11,500-289,000 mIU/mL  12-16 Weeks . . . . . 18,300-137,000 mIU/mL  16-29 Weeks. . . . . . 1,400- 53,000 mIU/mL  (2nd Trimester)  29-41 Weeks. . . . . . . 940- 60,000 mIU/mL  (3rd Trimester)  INTERPRETIVE NOTE:  For diagnostic purposes, hCG results should be used  conjunction with other data.  If the hCG level is inconsistent with clinical evidence,  results should be confirmed by an alternate hCG method.  This assay is approved for use in the early detection  of pregnancy only. It is not approved for any other  uses such as tumor marker screening or monitoring.  Beta HCG  ranges were updated 2/2007 to reflect  methodology referencing to the 4th World Health  Organization (WHO) International Standard.       If you have any questions, please call the clinic to make an appointment.    Sincerely,    Dawn Garcia MD

## 2017-07-22 ASSESSMENT — PATIENT HEALTH QUESTIONNAIRE - PHQ9: SUM OF ALL RESPONSES TO PHQ QUESTIONS 1-9: 14

## 2017-07-24 NOTE — PROGRESS NOTES
"SUBJECTIVE  HPI: Antonette Horton is a 26 year old female who presents with complaints of nausea with vomiting and weight loss. She states that since around the time she had her last Depo Injection, she has had intermittent nausea, which sometimes leads to vomiting. She has difficulty tolerating meals and is only able to eat small servings or she feels she may vomit. She reports that she is able to tolerate adequate fluid intake. Her greatest concern is her large amount of weight loss. Since 5/11/17 she has lost 15 lbs.  She states that because of her weight loss, \"her bones are popping out.\" She attributes this loss of appetite and nausea to the depo provera injection, despite having tolerated this medication for > 6 yrs previously.     The patient states that because of her current symptoms, she feels very anxious and down. She states, \"I don't feel like myself.\" She denies any thoughts of hurting herself or others. She states she has her children to think about. She feels worried because she doesn't know why she is losing weight. She has been having difficulty falling asleep at night because of her anxiousness.      ROS: Positive for headaches. Denies diarrhea, constipation, abdominal pain. Denies dizziness or lightheadedness.     PMH:  Patient Active Problem List    Diagnosis Date Noted     Retroflexion of uterus 07/13/2015     Priority: Medium     Very anterior cervix  - sampling cervix is particularly challenging 7/13/15       Depo-Provera contraceptive status 12/12/2013     Priority: Medium     5/11/2017  Plan Documentation  Service ordered Depo Provera injection (150mg IM) may be given every 3 months for one year per protoccol.  Plan and order should be renewed at a visit no later than 5/17/2018 .    Joanna Monroe                   Social Hx:  Social History     Social History Narrative     History   Smoking Status     Current Every Day Smoker     Types: Cigarettes   Smokeless Tobacco     Never Used     " "Comment: a couple cig per day     OBJECTIVE:  Vitals: /79  Pulse 64  Temp 98.2  F (36.8  C) (Oral)  Ht 5' 4.75\" (164.5 cm)  Wt 116 lb 6.4 oz (52.8 kg)  SpO2 99%  BMI 19.52 kg/m2  General: Thin, young woman. Tearful. Emotionally distressed.  HEENT: Moist mucous membranes. Extraocular movements intact. Sclera non-injected. Hearing grossly intact. Oropharynx without swelling, erythema, or exudate. No cervical lymphadenopathy. Neck supple with full range of motion. No thyromegaly.  Heart: Regular rate and rhythm. No murmurs, rubs, or gallops.  Extremities: Extremities warm and well-perfused. No peripheral edema.  Lungs: Clear to auscultation bilaterally. No wheezes or crackles. Good air movement.  GI: Abdomen normal to inspection. No ridigidity, distension, or guarding. Normoactive bowel sounds. Soft and non-tender to palpation throughout abdomen. No hepato- or splenomegaly.  Psych: Elevated speech, loud volume. Irritable mood, labile affect. Lacks insight.       ASSESSMENT & PLAN:    Antonette was seen today for other.    Diagnoses and all orders for this visit:    Nausea and vomiting, intractability of vomiting not specified, unspecified vomiting type  Loss of weight: Unclear etiology. Normal physical exam except for abnormal psych component. Unlikely secondary to adverse reaction from depo provera injection, though part of differential. Will start workup with lab work today. Will check TSH, b-HCG, ESR, CRP, CMP and CBC today. Will prescribe Zofran for nausea and vomiting. Will prescribe Hydroxyzine for insomnia, anxiety and anti-nausea effects. Will have patient return to clinic next week for follow up. Of note, patient very irritable today due to time constraints - needed to leave to  kids, running late today. Patient advised to make follow up visit earlier in afternoon in case provider is running behind.   -     hydrOXYzine (ATARAX) 25 MG tablet; Take 2 tablets (50 mg) by mouth nightly as needed for " anxiety  -     ondansetron (ZOFRAN ODT) 4 MG ODT tab; Take 1-2 tablets (4-8 mg) by mouth every 8 hours as needed for nausea  -     Thyroid [place] ([facility])  -     C-Reactive Protein ([facility])  -     Basic Metabolic Profile ([facility]) - Results > 1 hr  -     CBC w/ Diff and Plt ([facility])  -     Erythrocyte Sed Rate ([facility])  -     Beta-HCG Quantitative ([facility])    Insomnia, unspecified type: Patient with very anxious mood on exam today. Anxiety likely contributing to difficulty falling asleep. Will give hydroxyzine today and follow up with patient next week. Will consider referral to behavioral health if patient willing at next visit.   -     hydrOXYzine (ATARAX) 25 MG tablet; Take 2 tablets (50 mg) by mouth nightly as needed for anxiety      Orders Placed This Encounter   Procedures     Thyroid [place] ([facility])     C-Reactive Protein ([facility])     HCG qualitative     Basic Metabolic Profile ([facility]) - Results > 1 hr     CBC w/ Diff and Plt ([facility])     Erythrocyte Sed Rate ([facility])     Beta-HCG Quantitative ([facility])       Return to clinic in one week.      The patient was actively involved in the decision making process, and all the questions were answered to their satisfaction prior to leaving.       Patient precepted with attending physician, Dr. Chappell.    Dawn Garcia DO   PGY-2 [facility]  [facility]  Pager: [phone]

## 2017-12-01 DIAGNOSIS — R39.89 POSSIBLE URINARY TRACT INFECTION: Primary | ICD-10-CM

## 2018-02-27 ENCOUNTER — OFFICE VISIT (OUTPATIENT)
Dept: FAMILY MEDICINE | Facility: CLINIC | Age: 28
End: 2018-02-27
Payer: COMMERCIAL

## 2018-02-27 VITALS
HEIGHT: 65 IN | BODY MASS INDEX: 20.69 KG/M2 | OXYGEN SATURATION: 99 % | DIASTOLIC BLOOD PRESSURE: 74 MMHG | WEIGHT: 124.2 LBS | TEMPERATURE: 97.8 F | HEART RATE: 78 BPM | SYSTOLIC BLOOD PRESSURE: 115 MMHG

## 2018-02-27 DIAGNOSIS — R07.0 THROAT PAIN: Primary | ICD-10-CM

## 2018-02-27 LAB — S PYO AG THROAT QL IA.RAPID: NEGATIVE

## 2018-02-27 NOTE — PATIENT INSTRUCTIONS
No strep A today will be relooked tomorrow  If positive will treat   rest your voice   stop smoking

## 2018-02-27 NOTE — PROGRESS NOTES
"S: Antonette Horton is a 27 year old female who returns for follow up of  3 day hx sore throat, pain with swallowing, also losing voice   Daughter with Strep Pahingitis  Patients states that main concern today is sore throat     PMHX/PSHX/MEDS/ALLERGIES/SHX/FHX reviewed and updated in Epic.      ROS:  General: No fevers, chills  Head: No headache  Ears: No acute change in hearing.    CV: No chest pain or palpitations.  Resp: No shortness of breath.  No cough. No hemoptysis.  GI: No nausea, vomiting, constipation, diarrhea  : No urinary pains    O: /74  Pulse 78  Temp 97.8  F (36.6  C) (Oral)  Ht 5' 5\" (165.1 cm)  Wt 124 lb 3.2 oz (56.3 kg)  LMP 02/24/2018  SpO2 99%  BMI 20.67 kg/m2   Gen:  Well nourished and in NAD  HEENT: PERRLA; TMs normal color and landmarks; nasopharynx pink and moist; oropharynx pink and moist  Neck: supple without lymphadenopathy  CV:  RRR  - no murmurs, rubs, or gallups,   Pulm:  CTAB, no wheezes/rales/rhonchi, good air entry   ABD: soft, nontender, no masses, no rebound, BS intact throughout  Extrem: no cyanosis, edema or clubbing  Psych: Euthymic      (R07.0) Throat pain  (primary encounter diagnosis)  Comment: Daughter with  Recent Hx sprep A pahringitis  Plan: Rapid Strep Screen (Group) (P )        Negative :Symptomatic care   Fluids   Rest your voice  Discussed treatment for her as contact but wants to wants for confirmatory result which I expect to be neagtive    RTC as needed /preventive exam or sooner if develops new or worsening symptoms.    Abe Rossi      "

## 2018-02-27 NOTE — MR AVS SNAPSHOT
"              After Visit Summary   2018    Antonette Horton    MRN: [id number]           Patient Information     Date Of Birth          1990        Visit Information        Provider Department      2018 10:20 AM Abe Rossi MD [facility]        Today's Diagnoses     Throat pain    -  1      Care Instructions    No strep A today will be relooked tomorrow  If positive will treat   rest your voice   stop smoking          Follow-ups after your visit        Who to contact     Please call your clinic at [phone] to:    Ask questions about your health    Make or cancel appointments    Discuss your medicines    Learn about your test results    Speak to your doctor            Additional Information About Your Visit        MyChart Information     Matchbint is an electronic gateway that provides easy, online access to your medical records. With Picodeon, you can request a clinic appointment, read your test results, renew a prescription or communicate with your care team.     To sign up for Matchbint visit the website at [email or web address]   You will be asked to enter the access code listed below, as well as some personal information. Please follow the directions to create your username and password.     Your access code is: [email or web address]  Expires: 2018 11:01 AM     Your access code will  in 90 days. If you need help or a new code, please contact your [facility] or call [phone] for assistance.        Care EveryWhere ID     This is your Care EveryWhere ID. This could be used by other organizations to access your [facility] medical records  [id number]        Your Vitals Were     Pulse Temperature Height Last Period Pulse Oximetry BMI (Body Mass Index)    78 97.8  F (36.6  C) (Oral) 5' 5\" (165.1 cm) 2018 99% 20.67 kg/m2       Blood Pressure from Last 3 Encounters:   18 115/74   17 118/79   17 106/67    Weight from Last 3 Encounters: "   02/27/18 124 lb 3.2 oz (56.3 kg)   07/21/17 116 lb 6.4 oz (52.8 kg)   05/11/17 131 lb (59.4 kg)              We Performed the Following     Group A Strep Throat ([facility])     Rapid Strep Screen (Group) ([facility] )        Primary Care Provider Office Phone # Fax #    Prasanth Mcmahon [phone]       [address] [address]        Equal Access to Services     JERAMIE KNOX : Hadii aad ku hadasho Soomaali, waaxda luqadaha, qaybta kaalmada adeegyada, waxay idiin hayaan adeeg alyssa cuellar . So [facility] [phone].    ATENCIÓN: Si habla español, tiene a kyle disposición servicios gratuitos de asistencia lingüística. [facility] [phone].    We comply with applicable federal civil rights laws and [place] laws. We do not discriminate on the basis of race, color, national origin, age, disability, sex, sexual orientation, or gender identity.            Thank you!     Thank you for choosing [facility]  for your care. Our goal is always to provide you with excellent care. Hearing back from our patients is one way we can continue to improve our services. Please take a few minutes to complete the written survey that you may receive in the mail after your visit with us. Thank you!             Your Updated Medication List - Protect others around you: Learn how to safely use, store and throw away your medicines at www.disposemymeds.org.          This list is accurate as of 2/27/18 11:01 AM.  Always use your most recent med list.                   Brand Name Dispense Instructions for use Diagnosis    cyclobenzaprine 10 MG tablet    FLEXERIL    30 tablet    Take 1 tablet (10 mg) by mouth 3 times daily as needed for muscle spasms    Spasm of lumbar paraspinous muscle       Digital Thermometer/Beeper Misc     1 each    1 Device daily as needed    Viral upper respiratory tract infection       HYDROcodone-acetaminophen 5-325 MG per tablet    NORCO    20 tablet    Take 1-2 tablets by  mouth every 4 hours as needed for moderate to severe pain    Nonintractable episodic headache, unspecified headache type       hydrOXYzine 25 MG tablet    ATARAX    90 tablet    Take 2 tablets (50 mg) by mouth nightly as needed for anxiety    Nausea and vomiting, intractability of vomiting not specified, unspecified vomiting type, Insomnia, unspecified type       medroxyPROGESTERone 150 MG/ML injection    DEPO-PROVERA    1 mL    Inject 1 mL (150 mg) into the muscle every 3 months    Encounter for surveillance of injectable contraceptive       ondansetron 4 MG ODT tab    ZOFRAN ODT    20 tablet    Take 1-2 tablets (4-8 mg) by mouth every 8 hours as needed for nausea    Nausea and vomiting, intractability of vomiting not specified, unspecified vomiting type       SUMAtriptan 50 MG tablet    IMITREX    16 tablet    Take 1 tablet (50 mg) by mouth at onset of headache for migraine May repeat in 2 hours. Max 4 tablets/24 hours.    Nonintractable episodic headache, unspecified headache type

## 2018-02-27 NOTE — LETTER
February 28, 2018      Antonette Horton  [address]   [place] [address]        Dear Antonette,  Your strep is negative.  Please see below for your test results.    Resulted Orders   Rapid Strep Screen (Group) ([facility])   Result Value Ref Range    Rapid Strep A Screen NEGATIVE Negative   Group A Strep Throat ([facility])   Result Value Ref Range    Group A Strep,Throat No Group A Strep rRNA detected No Group A Strep rRNA detected    Narrative    Test performed by:  [facility] [address] [place] [address]  Intended Use:  The GEN-PROBE Group A Streptococcus direct test is a DNA probe assay which   uses nucleic acid hybridization for the qualitative detection of Group A   Streptococcal RNA to aid in the diagnosis of Group A Streptococcal pharyngitis   from throat swabs.  Methodology:  The GEN-PROBE DNA probe assay uses a single-stranded DNA probe with a   chemiluminescent label, which is complementary to the ribosomal RNA of the   target organism.  The labeled DNA probe combines with the ribosomal RNA to   form a stable DNA:RNA hybrid.  The labeled DNA:RNA hybrids are measured in   GEN-PROBE luminometer.  A positive result is a luminometer reading greater   than or equal to the cut-off.  A value below this cut-off is a negative   result.       If you have any questions, please call the clinic to make an appointment.    Sincerely,    Abe Rossi MD

## 2018-02-28 LAB — GROUP A STREP,THROAT: NORMAL

## 2018-07-13 ENCOUNTER — OFFICE VISIT (OUTPATIENT)
Dept: FAMILY MEDICINE | Facility: CLINIC | Age: 28
End: 2018-07-13
Payer: COMMERCIAL

## 2018-07-13 VITALS
TEMPERATURE: 98.3 F | RESPIRATION RATE: 18 BRPM | HEIGHT: 66 IN | DIASTOLIC BLOOD PRESSURE: 78 MMHG | BODY MASS INDEX: 19.73 KG/M2 | HEART RATE: 62 BPM | WEIGHT: 122.8 LBS | SYSTOLIC BLOOD PRESSURE: 115 MMHG

## 2018-07-13 DIAGNOSIS — Z11.3 SCREEN FOR STD (SEXUALLY TRANSMITTED DISEASE): ICD-10-CM

## 2018-07-13 DIAGNOSIS — N91.2 AMENORRHEA: Primary | ICD-10-CM

## 2018-07-13 LAB
BACTERIA: NORMAL
CLUE CELLS: NORMAL
HCG UR QL: NEGATIVE
HIV 1+2 AB+HIV1 P24 AG SERPL QL IA: NEGATIVE
MOTILE TRICHOMONAS: NEGATIVE
ODOR: NORMAL
PH WET PREP: NORMAL
WBC WET PREP: <2
YEAST: NORMAL

## 2018-07-13 NOTE — PROGRESS NOTES
Preceptor Attestation:   Patient seen, evaluated and discussed with the resident. I have verified the content of the note, which accurately reflects my assessment of the patient and the plan of care.   Supervising Physician:  Florentino Little MD

## 2018-07-13 NOTE — MR AVS SNAPSHOT
After Visit Summary   7/13/2018    Antonette Horton    MRN: [id number]           Patient Information     Date Of Birth          1990        Visit Information        Provider Department      7/13/2018 3:50 PM Camila Hunt MD [facility]        Today's Diagnoses     Amenorrhea    -  1    Screen for STD (sexually transmitted disease)          Care Instructions    Thank you for coming to [facility] for your care. It was a pleasure to take care of you!    - I will call you with the results of your tests.  - Please use protection in your future sexual encounters so that you protect yourself from sexually transmitted diseases.   - Return to clinic if you have persistent abdominal cramping    Camila Hunt MD            Follow-ups after your visit        Follow-up notes from your care team     Return if symptoms worsen or fail to improve.      Future tests that were ordered for you today     Open Future Orders        Priority Expected Expires Ordered    Chlamydia trachomatis PCR [DYI134] Routine  7/13/2019 7/13/2018    Neisseria gonorrhoeae PCR [VZI7518] Routine  7/13/2019 7/13/2018    HIV Antigen Antibody Combo [AEJ4883] Routine  7/13/2019 7/13/2018            Who to contact     Please call your clinic at [phone] to:    Ask questions about your health    Make or cancel appointments    Discuss your medicines    Learn about your test results    Speak to your doctor            Additional Information About Your Visit        MyChart Information     Choistert is an electronic gateway that provides easy, online access to your medical records. With Payteller, you can request a clinic appointment, read your test results, renew a prescription or communicate with your care team.     To sign up for Choistert visit the website at [email or web address]   You will be asked to enter the access code listed below, as well as some personal information. Please follow the directions to  "create your username and password.     Your access code is: [email or web address]  Expires: 10/11/2018  4:53 PM     Your access code will  in 90 days. If you need help or a new code, please contact your [facility] or call [phone] for assistance.        Care EveryWhere ID     This is your Care EveryWhere ID. This could be used by other organizations to access your [facility] medical records  [id number]        Your Vitals Were     Pulse Temperature Respirations Height BMI (Body Mass Index)       62 98.3  F (36.8  C) 18 5' 5.5\" (166.4 cm) 20.12 kg/m2        Blood Pressure from Last 3 Encounters:   18 115/78   18 115/74   17 118/79    Weight from Last 3 Encounters:   18 122 lb 12.8 oz (55.7 kg)   18 124 lb 3.2 oz (56.3 kg)   17 116 lb 6.4 oz (52.8 kg)              We Performed the Following     HCG Qualitative Urine ([facility])        Primary Care Provider Office Phone # Fax #    Prasanth Mcmahon [phone]       [address] [place] [address]        Equal Access to Services     JERAMIE KNOX AH: Hadii vivien ku hadasho Soomaali, waaxda luqadaha, qaybta kaalmada adeegyada, alin richradson. So [facility] [phone].    ATENCIÓN: Si habla español, tiene a kyle disposición servicios gratuitos de asistencia lingüística. [facility] [phone].    We comply with applicable federal civil rights laws and [place] laws. We do not discriminate on the basis of race, color, national origin, age, disability, sex, sexual orientation, or gender identity.            Thank you!     Thank you for choosing [facility]  for your care. Our goal is always to provide you with excellent care. Hearing back from our patients is one way we can continue to improve our services. Please take a few minutes to complete the written survey that you may receive in the mail after your visit with us. Thank you!             Your Updated Medication List - " Protect others around you: Learn how to safely use, store and throw away your medicines at www.disposemymeds.org.          This list is accurate as of 7/13/18  4:53 PM.  Always use your most recent med list.                   Brand Name Dispense Instructions for use Diagnosis    cyclobenzaprine 10 MG tablet    FLEXERIL    30 tablet    Take 1 tablet (10 mg) by mouth 3 times daily as needed for muscle spasms    Spasm of lumbar paraspinous muscle       Digital Thermometer/Beeper Misc     1 each    1 Device daily as needed    Viral upper respiratory tract infection       HYDROcodone-acetaminophen 5-325 MG per tablet    NORCO    20 tablet    Take 1-2 tablets by mouth every 4 hours as needed for moderate to severe pain    Nonintractable episodic headache, unspecified headache type       hydrOXYzine 25 MG tablet    ATARAX    90 tablet    Take 2 tablets (50 mg) by mouth nightly as needed for anxiety    Nausea and vomiting, intractability of vomiting not specified, unspecified vomiting type, Insomnia, unspecified type       medroxyPROGESTERone 150 MG/ML injection    DEPO-PROVERA    1 mL    Inject 1 mL (150 mg) into the muscle every 3 months    Encounter for surveillance of injectable contraceptive       ondansetron 4 MG ODT tab    ZOFRAN ODT    20 tablet    Take 1-2 tablets (4-8 mg) by mouth every 8 hours as needed for nausea    Nausea and vomiting, intractability of vomiting not specified, unspecified vomiting type       SUMAtriptan 50 MG tablet    IMITREX    16 tablet    Take 1 tablet (50 mg) by mouth at onset of headache for migraine May repeat in 2 hours. Max 4 tablets/24 hours.    Nonintractable episodic headache, unspecified headache type

## 2018-07-13 NOTE — PROGRESS NOTES
"       SUBJECTIVE       Antonette Horton is a 27 year old female with a PMH significant for   Patient Active Problem List   Diagnosis     Depo-Provera contraceptive status     Retroflexion of uterus     Vaginal bleeding before 22 weeks gestation     Hx of PTL ( labor), current pregnancy     Vaginal leukorrhea     Placenta disorder      who presents for reevaluation for STD.     STD Check  Patient has had multiple checks in the past for STDs. She is a pleasant lady who comes in today for STD check. States first sexual encounter with new partner was about a week ago. Reports not using any protection and since then feel that she has some mild lower abdominal cramping going on that started about 4 days ago. Pain feels like menstrual cramps. Patient denies any vaginal discharge or itching but states that she has had some spotting. Admits that she has had chlamydia in the past. Denies any other STDs. No dysuria, increased frequency or hematuria.    Spotting and Irregular periods  She took a pregnancy test 2 weeks ago that was negative. Patient was on Depo provera for 7 years during which period she was amenorrheic. She is currently not on any birth control and would not like to be started on anything at this moments. Has been having irregular periods over the past year since getting off Depo.     Patient speaks English, so an  was not used.    Medications and problem list have been reviewed and updated.         REVIEW OF SYSTEMS     General: No fevers, chills  Head: No headache, dizziness  Neck: No swallowing problems   Resp: No cough. No congestion, coryza  GI: No constipation, diarrhea, no nausea or vomiting, notable for lower abdominal cramping.   Skin: No rash        OBJECTIVE     Vitals:    18 1611   BP: 115/78   Pulse: 62   Resp: 18   Temp: 98.3  F (36.8  C)   Weight: 122 lb 12.8 oz (55.7 kg)   Height: 5' 5.5\" (166.4 cm)     Body mass index is 20.12 kg/(m^2).    Gen:  In NAD, good color, appears " well hydrated  HEENT: Clear without conjunctival injection or scleral icterus.  Neck: supple without lymphadenopathy  CV:  RRR  - no murmurs, age appropriate rate  Pulm:  CTAB, no wheezes/rales/rhonchi, good air entry   Abdomen: soft, mildly tender to palpation in the lower abdominal quadrant and suprapubic region, no masses, no rebound, BS intact throughout  : NO vaginal lesion or rashes noted. Vulva appears normal, small amount of whitish discharge noted, appears physiologic. Mild spotting on q tip noted.   Skin: No rashes or lesions noted.    ASSESSMENT AND PLAN     Antonette was seen today for std and amenorrhea.    At risk for STD due to past hx of STD    Diagnoses and all orders for this visit:    Screen for STD (sexually transmitted disease)  Will plan to check for HIV, chlamyia and wet prep today. No vaginal lesions noted, no suspicious discharge either. Discussed about risk of unprotected intercourse as risk for STDs. Will try to use protection in the future. Lower abdominal cramping and spotting could be due to onset of menses again having stopped her Depo for over a year now.   -     HIV Ag/Ab Screen Cascade   -     Chlamydia/Gono Amplified  -     Wet Prep     Amenorrhea  Due to irregular periods and hx of intercourse without protection will complete a UPT today.   -     HCG Qualitative Urine ([facility])    Options for treatment and/or follow-up care were reviewed with the patient who was engaged and actively involved in the decision making process and verbalized understanding of the options discussed and was satisfied with the final plan.    I discussed the patient's findings, assessment and plan with attending physician Dr. Florentino Little who was agreeable with plan.    Camila Hunt, PGY2

## 2018-07-13 NOTE — PATIENT INSTRUCTIONS
Thank you for coming to [facility] for your care. It was a pleasure to take care of you!    - I will call you with the results of your tests.  - Please use protection in your future sexual encounters so that you protect yourself from sexually transmitted diseases.   - Return to clinic if you have persistent abdominal cramping    Camila Hunt MD

## 2018-07-16 LAB
C TRACH RRNA CVX QL NAA+PROBE: NEGATIVE
N GONORRHOEA RRNA SPEC QL NAA+PROBE: NEGATIVE

## 2018-07-16 NOTE — PROGRESS NOTES
Results discussed with patient over phone, UPT negative, STD testing so far negative. Chlamydia and Gonorrhea results continue to pend.     Camila Hunt

## 2018-07-17 ENCOUNTER — TELEPHONE (OUTPATIENT)
Dept: FAMILY MEDICINE | Facility: CLINIC | Age: 28
End: 2018-07-17

## 2018-07-17 NOTE — TELEPHONE ENCOUNTER
Call the following to the pt:  Component      Latest Ref Rng & Units 7/13/2018   Yeast Wet Prep      none None   Motile Trichomonas Wet Prep      Negative Negative   Clue Cells Wet Prep      NONE Present <20%   WBC WET PREP      2 - 5 <2   Bacteria Wet Prep      None Moderate   pH Wet Prep      3.8 - 4.5 Not performed   Odor Wet Prep      NONE None   Chlamydia trac,Amplified Prb      Negative Negative   N gonorrhoeae,Amplified Prb      Negative Negative   HCG Qual Urine      Negative NEGATIVE   HIV Antigen/Antibody      Negative Negative   /YASMEEN Raygoza

## 2018-07-17 NOTE — TELEPHONE ENCOUNTER
[facility] phone call message- general phone call:    Reason for call: She needs a call back with her results.    Return call needed: Yes    OK to leave a message on voice mail? Yes    Primary language: English      needed? No    Call taken on July 17, 2018 at 11:54 AM by Flaco Head

## 2018-08-21 ENCOUNTER — OFFICE VISIT (OUTPATIENT)
Dept: FAMILY MEDICINE | Facility: CLINIC | Age: 28
End: 2018-08-21
Payer: COMMERCIAL

## 2018-08-21 ENCOUNTER — TELEPHONE (OUTPATIENT)
Dept: FAMILY MEDICINE | Facility: CLINIC | Age: 28
End: 2018-08-21

## 2018-08-21 VITALS
WEIGHT: 122.6 LBS | BODY MASS INDEX: 20.43 KG/M2 | OXYGEN SATURATION: 100 % | DIASTOLIC BLOOD PRESSURE: 74 MMHG | RESPIRATION RATE: 20 BRPM | TEMPERATURE: 98.7 F | HEART RATE: 68 BPM | HEIGHT: 65 IN | SYSTOLIC BLOOD PRESSURE: 114 MMHG

## 2018-08-21 DIAGNOSIS — N91.2 AMENORRHEA: ICD-10-CM

## 2018-08-21 DIAGNOSIS — N91.2 ABSENCE OF MENSTRUATION: Primary | ICD-10-CM

## 2018-08-21 LAB
ESTRADIOL SERPL-MCNC: 131 PG/ML
FSH: 5.1 MIU/ML
HCG UR QL: NEGATIVE
PROLACTIN SERPL-MCNC: 7.8 NG/ML (ref 0–20)

## 2018-08-21 ASSESSMENT — PAIN SCALES - GENERAL: PAINLEVEL: NO PAIN (0)

## 2018-08-21 NOTE — MR AVS SNAPSHOT
"              After Visit Summary   8/21/2018    Antonette Horton    MRN: [id number]           Patient Information     Date Of Birth          1990        Visit Information        Provider Department      8/21/2018 4:30 PM [facility]        Today's Diagnoses     Absence of menstruation    -  1    Amenorrhea          Care Instructions    ABDOMINAL ULTRASOUND/TRANSVAGINAL:  [facility]  Radiology Department 1st floor  [address] [address]  [phone]  Date:  Thursday August 23, 2018  Time:  5:45 PM  Please have a full bladder by drinking 32 ounces of water and refrain from using the bathroom 1 hour prior to appointment.  Patient aware of appointment.  Brynn Umaña  8/23/18                Follow-ups after your visit        Your next 10 appointments already scheduled     Aug 24, 2018 11:20 AM CDT   Return Visit with Prasanth Mcmahon [facility]   [facility]    [address] [place] [address]   [phone]              Who to contact     Please call your clinic at [phone] to:    Ask questions about your health    Make or cancel appointments    Discuss your medicines    Learn about your test results    Speak to your doctor            Additional Information About Your Visit        Care EveryWhere ID     This is your Care EveryWhere ID. This could be used by other organizations to access your [facility] medical records  [id number]        Your Vitals Were     Pulse Temperature Respirations Height Pulse Oximetry Breastfeeding?    68 98.7  F (37.1  C) (Oral) 20 5' 4.76\" (164.5 cm) 100% No    BMI (Body Mass Index)                   20.55 kg/m2            Blood Pressure from Last 3 Encounters:   08/21/18 114/74   07/13/18 115/78   02/27/18 115/74    Weight from Last 3 Encounters:   08/21/18 122 lb 9.6 oz (55.6 kg)   07/13/18 122 lb 12.8 oz (55.7 kg)   02/27/18 124 lb 3.2 oz (56.3 kg)              We Performed the Following     Estradiol ([facility])     " FSH ([facility])     HCG Qualitative Urine (UPT)  ([facility])     Prolactin ([facility])        Primary Care Provider Office Phone # Fax #    Prasanth Mcmahon [phone]       [address] [place] [address]        Equal Access to Services     DONTEROCKY LISETTE : Hadavis vivien hutchins jonnyo Soomaali, waaxda luqadaha, qaybta kaalmada adeegyada, alin maradiagan piedaddana shah polo richardson. So [facility] [phone].    ATENCIÓN: Si habla español, tiene a kyle disposición servicios gratuitos de asistencia lingüística. Llame al [phone].    We comply with applicable federal civil rights laws and [place] laws. We do not discriminate on the basis of race, color, national origin, age, disability, sex, sexual orientation, or gender identity.            Thank you!     Thank you for choosing [facility]  for your care. Our goal is always to provide you with excellent care. Hearing back from our patients is one way we can continue to improve our services. Please take a few minutes to complete the written survey that you may receive in the mail after your visit with us. Thank you!             Your Updated Medication List - Protect others around you: Learn how to safely use, store and throw away your medicines at www.disposemymeds.org.          This list is accurate as of 8/21/18 11:59 PM.  Always use your most recent med list.                   Brand Name Dispense Instructions for use Diagnosis    SUMAtriptan 50 MG tablet    IMITREX    16 tablet    Take 1 tablet (50 mg) by mouth at onset of headache for migraine May repeat in 2 hours. Max 4 tablets/24 hours.    Nonintractable episodic headache, unspecified headache type

## 2018-08-21 NOTE — TELEPHONE ENCOUNTER
Patient has concerns regarding her period. She was seen July for amenorrhea post depo x's 1.5 years and at that time she had STD testing and an UPT completed. Everything came back negative but she still has not had a period yet. She states that her breast have been tender and she has not been feeling like herself. Nurse advised patient to return to clinic for a follow up where some additional testing can be completed to check her hormones or tsh. Depending on what those results show would set the next step. Patient made an appt with Dr. Chappell today at 430      /L.YASMEEN Salomon  Routed to Dr. Chappell

## 2018-08-21 NOTE — TELEPHONE ENCOUNTER
[facility] phone call message- general phone call:    Reason for call: Personal     Return call needed: Yes    OK to leave a message on voice mail? Yes    Primary language: English      needed? No    Call taken on August 21, 2018 at 1:56 PM by Miguel Gunter

## 2018-08-22 ENCOUNTER — TELEPHONE (OUTPATIENT)
Dept: FAMILY MEDICINE | Facility: CLINIC | Age: 28
End: 2018-08-22

## 2018-08-22 NOTE — TELEPHONE ENCOUNTER
Patient called the clinic with request for her lab results from yesterday. Normal results were given. Patient questions what she should do next. Because her period still has not came, she is losing weight and having stomach pain. She states she would like a rx for steroids to help her get her appetite back.     Nurse spoke with Dr. Chappell by phone and he says that her next step is U/S as he is not able to say that all of her symptoms are related to the absence of he period. Once the U/S is completed he would like for her to follow up in clinic. This information was given to the patient and she verbalized understanding. She was advised to call our referral department if she has not heard anything by tomorrow. /YASMEEN Ugalde  Routed to Dr. Chappell and referrals

## 2018-08-22 NOTE — TELEPHONE ENCOUNTER
[facility] phone call message- general phone call:     Reason for call: Pt would like a call back from nurse re her visit yesterday. She states its personal and left no details.      Return call needed: Yes    OK to leave a message on voice mail? Yes    Primary language: English      needed? No    Call taken on August 22, 2018 at 11:44 AM by Salma Callahan

## 2018-08-23 ENCOUNTER — TRANSFERRED RECORDS (OUTPATIENT)
Dept: HEALTH INFORMATION MANAGEMENT | Facility: CLINIC | Age: 28
End: 2018-08-23

## 2018-08-23 ENCOUNTER — HOSPITAL ENCOUNTER (OUTPATIENT)
Dept: ULTRASOUND IMAGING | Facility: CLINIC | Age: 28
Discharge: HOME | End: 2018-08-23
Attending: FAMILY MEDICINE

## 2018-08-23 ENCOUNTER — RECORDS - HEALTHEAST (OUTPATIENT)
Dept: ADMINISTRATIVE | Facility: OTHER | Age: 28
End: 2018-08-23

## 2018-08-23 DIAGNOSIS — N91.2 ABSENCE OF MENSTRUATION: ICD-10-CM

## 2018-08-23 DIAGNOSIS — N91.2 AMENORRHEA: ICD-10-CM

## 2018-08-23 NOTE — PATIENT INSTRUCTIONS
ABDOMINAL ULTRASOUND/TRANSVAGINAL:  [facility]  Radiology Department 1st floor  [address] [address]  [phone]  Date:  Thursday August 23, 2018  Time:  5:45 PM  Please have a full bladder by drinking 32 ounces of water and refrain from using the bathroom 1 hour prior to appointment.  Patient aware of appointment.  Brynn Umaña  8/23/18

## 2018-08-24 ENCOUNTER — OFFICE VISIT (OUTPATIENT)
Dept: FAMILY MEDICINE | Facility: CLINIC | Age: 28
End: 2018-08-24
Payer: COMMERCIAL

## 2018-08-24 VITALS
RESPIRATION RATE: 16 BRPM | HEIGHT: 65 IN | TEMPERATURE: 99.1 F | WEIGHT: 120.4 LBS | DIASTOLIC BLOOD PRESSURE: 72 MMHG | BODY MASS INDEX: 20.06 KG/M2 | HEART RATE: 70 BPM | SYSTOLIC BLOOD PRESSURE: 111 MMHG | OXYGEN SATURATION: 100 %

## 2018-08-24 DIAGNOSIS — R11.2 NAUSEA AND VOMITING, INTRACTABILITY OF VOMITING NOT SPECIFIED, UNSPECIFIED VOMITING TYPE: ICD-10-CM

## 2018-08-24 DIAGNOSIS — N91.2 ABSENCE OF MENSTRUATION: Primary | ICD-10-CM

## 2018-08-24 LAB
% GRANULOCYTES: 61.6 %G (ref 40–75)
ALBUMIN SERPL-MCNC: 4.7 MG/DL (ref 3.9–5.1)
ALP SERPL-CCNC: 48 U/L (ref 40–150)
ALT SERPL-CCNC: <15 U/L (ref 0–45)
AST SERPL-CCNC: 15.6 U/L (ref 0–45)
B-HCG SERPL-ACNC: <2 MLU/ML (ref 0–4)
BILIRUB SERPL-MCNC: 0.7 MG/DL (ref 0.2–1.3)
BUN SERPL-MCNC: 8.2 MG/DL (ref 7–19)
CALCIUM SERPL-MCNC: 9.4 MG/DL (ref 8.5–10.1)
CHLORIDE SERPLBLD-SCNC: 104.5 MMOL/L (ref 98–110)
CO2 SERPL-SCNC: 23.1 MMOL/L (ref 20–32)
CREAT SERPL-MCNC: 0.8 MG/DL (ref 0.5–1)
GFR SERPL CREATININE-BSD FRML MDRD: >90 ML/MIN/1.7 M2
GLUCOSE SERPL-MCNC: 94.1 MG'DL (ref 70–99)
GRANULOCYTES #: 4.1 K/UL (ref 1.6–8.3)
HCT VFR BLD AUTO: 38.9 % (ref 35–47)
HEMOGLOBIN: 12.1 G/DL (ref 11.7–15.7)
LIPASE SERPL-CCNC: 20 U/L (ref 0–52)
LYMPHOCYTES # BLD AUTO: 2.1 K/UL (ref 0.8–5.3)
LYMPHOCYTES NFR BLD AUTO: 32.1 %L (ref 20–48)
MCH RBC QN AUTO: 23.3 PG (ref 26.5–35)
MCHC RBC AUTO-ENTMCNC: 31.1 G/DL (ref 32–36)
MCV RBC AUTO: 75 FL (ref 78–100)
MID #: 0.4 K/UL (ref 0–2.2)
MID %: 6.3 %M (ref 0–20)
PLATELET # BLD AUTO: 244 K/UL (ref 150–450)
POTASSIUM SERPL-SCNC: 3.8 MMOL/DL (ref 3.2–4.6)
PROT SERPL-MCNC: 7.2 G/DL (ref 6.8–8.8)
RBC # BLD AUTO: 5.2 M/UL (ref 3.8–5.2)
SODIUM SERPL-SCNC: 138.3 MMOL/L (ref 132–142)
TSH SERPL DL<=0.05 MIU/L-ACNC: 1.69 UIU/ML (ref 0.3–5)
WBC # BLD AUTO: 6.6 K/UL (ref 4–11)

## 2018-08-24 RX ORDER — ONDANSETRON 4 MG/1
4 TABLET, FILM COATED ORAL EVERY 8 HOURS PRN
Qty: 18 TABLET | Refills: 1 | Status: SHIPPED | OUTPATIENT
Start: 2018-08-24 | End: 2018-09-04

## 2018-08-24 NOTE — PROGRESS NOTES
"This is a 28-year-old female for whom I am listed as primary however have not seen her very often.  She attends today frustrated that \"she is not getting any answers\".  I have asked her to review her medical history with me in an effort to be able to provide her with answers to her satisfaction.    She reports that she received the last Depo injection in May 2017 and thereafter kept bleeding for about 6 months.  She says the reason she stopped Depo was because of constant bleeding.  Apparently then for a few months she seemed to have returned to cyclical bleeding of a few days each month but then about 5 months ago says the periods stopped entirely.  She has not been using birth control and thought she might be pregnant but has had negative pregnancy tests including just 3 days ago.    Concerning her desires she would like to have one more child.  She previously had 2 children and her male partner has had 3 children and so she believes she is fertile.  She denies any vaginal discharge or irritation.  She just had a pelvic ultrasound performed yesterday.    In addition she reports that she is vomiting usually once per day.  She says that her general appetite is diminished but that when she does eat and finishes the meal she feels nauseated and throws up.  She says this is happening about once per day.  She denies any abdominal pain.  She denies any headaches although she has a history of migraine previously.  She does report that when she is under stress she cannot eat but says that she does not feel like currently she is under stress.  She is frustrated however since she does not understand what is going on.  She was given ranitidine but this did not help.  She denies any acid reflux symptoms.  She did have diarrhea for a few days a number of days ago but this has resolved and usually her bowel motions are regular and she does not of constipation.  She denies any burning with urination or urinary frequency.  She " "says the vomiting has been going on months and as a result she has lost weight.  Review of the records indicate that her weight has fluctuated quite widely and in fact approximately 1 year ago in July 2017 she was lighter than she is now.  She feels that her clothes are loose on her and she ideally would like to be about 10 pounds heavier.    She is also asking for a letter to support her need for tinted glass in her car which protects her from having migraine headaches due to bright sunlight.    Objective:  /72  Pulse 70  Temp 99.1  F (37.3  C) (Oral)  Resp 16  Ht 5' 4.57\" (164 cm)  Wt 120 lb 6.4 oz (54.6 kg)  SpO2 100%  BMI 20.31 kg/m2  Her vitals are reviewed and she is advised that her BMI is still in the \"normal\" range.  Old records are reviewed as outlined above  She has no goiter.   abdominal exam reveals mild bilateral flank tenderness but otherwise no guarding no rigidity.  She is no renal angle tenderness.    I reviewed the pelvic ultrasound results which are normal apart from a follicular cyst in both ovaries suggesting that she may be ovulating.    Results for orders placed or performed in visit on 08/21/18   HCG Qualitative Urine (UPT)  ([facility])   Result Value Ref Range    HCG Qual Urine NEGATIVE Negative   Prolactin ([facility])   Result Value Ref Range    Prolactin 7.8 0.0 - 20.0 ng/mL    Narrative    Test performed by:  [facility] [address] [place] [address]   Estradiol ([facility])   Result Value Ref Range    Estradiol 131 pg/mL    Narrative    Test performed by:  [facility] [address] [place] [address]  Males:  Prepubertal.................<12 pg/mL  Adult........................10-60 pg/mL  Females:  Prepubertal.................<8 pg/mL  Early Follicular............ pg/mL  Late Follicular.............100-400 pg/mL  Luteal...................... pg/mL  Postmenopausal..............<18 pg/mL   FSH ([facility])   Result Value Ref " Range    FSH 5.1 mIU/mL    Narrative    Test performed by:  [facility]  [address] [place] [address]     Antonette was seen today for recheck and counseling.    Diagnoses and all orders for this visit:    Absence of menstruation  -     progesterone (PROMETRIUM) 200 MG capsule; Take 2 capsules (400 mg) by mouth daily for 10 days  -     Thyroid [place] ([facility]); Future  -     Beta-HCG Quantitative ([facility]); Future    Nausea and vomiting, intractability of vomiting not specified, unspecified vomiting type  -     ondansetron (ZOFRAN) 4 MG tablet; Take 1 tablet (4 mg) by mouth every 8 hours as needed for nausea  -     Comprehensive Metabolic Panel ([place]); Future  -     CBC with Diff Plt ([facility]); Future  -     Thyroid [place] ([facility]); Future  -     Lipase ([facility]); Future  -     Beta-HCG Quantitative ([facility]); Future      I agree with her that daily vomiting should be explained.  I suggested we check a number of labs and she will return for these later today.  Given that she may be ovulating, she may not actually have a menses in about 14 days however I offered to prescribe progesterone ×10 days to see if this can provoke menses and she agrees.  The timing is quite appropriate in that she may be ovulating right now and therefore will be taking this prescription in the luteal phase.    Concerning her vomiting she has not had ondansetron and I recommended we prescribe it.  If her labs are normal I suggested that we see how she is in 2 weeks time.  If she continues to vomit I would recommend this be further worked up.  She agrees with this plan.    I did write a letter at her request concerning the benefits of having tinted windows in her car.    Total visit time was 45 mins, all of which was face to face MD time, and over 50% of this time was spent in counseling and coordination of care.

## 2018-08-24 NOTE — LETTER
August 27, 2018      Antonette Horton  [address]   [place] [address]        Dear Antonette,  All the lab results have turned out well.  This is very reassuring in addition to the other lab tests that were recently checked.  As we discussed please let me know after you have taken the medication how you are doing, whether you got a period and if you stopped vomiting.  Please see below for your test results.    Resulted Orders   Comprehensive Metabolic Panel ([place])   Result Value Ref Range    Albumin 4.7 3.9 - 5.1 mg/dL    Alkaline Phosphatase 48.0 40.0 - 150.0 U/L    ALT <15 0.0 - 45.0 U/L    AST 15.6 0.0 - 45.0 U/L    Bilirubin Total 0.7 0.2 - 1.3 mg/dL    Urea Nitrogen 8.2 7.0 - 19.0 mg/dL    Calcium 9.4 8.5 - 10.1 mg/dL    Chloride 104.5 98.0 - 110.0 mmol/L    Carbon Dioxide 23.1 20.0 - 32.0 mmol/L    Creatinine 0.8 0.5 - 1.0 mg/dL    Glucose 94.1 70.0 - 99.0 mg'dL    Potassium 3.8 3.2 - 4.6 mmol/dL    Sodium 138.3 132.0 - 142.0 mmol/L    Protein Total 7.2 6.8 - 8.8 g/dL    GFR Estimate >90 >60.0 mL/min/1.7 m2    GFR Estimate If Black >90 >60.0 mL/min/1.7 m2   CBC with Diff Plt ([facility])   Result Value Ref Range    WBC 6.6 4.0 - 11.0 K/uL    Lymphocytes # 2.1 0.8 - 5.3 K/uL    % Lymphocytes 32.1 20.0 - 48.0 %L    Mid # 0.4 0.0 - 2.2 K/uL    Mid % 6.3 0.0 - 20.0 %M    GRANULOCYTES # 4.1 1.6 - 8.3 K/uL    % Granulocytes 61.6 40.0 - 75.0 %G    RBC 5.2 3.8 - 5.2 M/uL    Hemoglobin 12.1 11.7 - 15.7 g/dL    Hematocrit 38.9 35.0 - 47.0 %    MCV 75.0 (L) 78.0 - 100.0 fL    MCH 23.3 (L) 26.5 - 35.0    MCHC 31.1 (L) 32.0 - 36.0 g/dL    Platelets 244.0 150.0 - 450.0 K/uL   Thyroid [place] ([facility])   Result Value Ref Range    TSH 1.69 0.30 - 5.00 uIU/mL    Narrative    Test performed by:  [facility]  [address] [place] [address]   Lipase ([facility])   Result Value Ref Range    Lipase 20 0 - 52 U/L    Narrative    Test performed by:  [facility]  [address] [place]  [address]   Beta-HCG Quantitative ([facility])   Result Value Ref Range    Beta hCG, Quantitative <2 0 - 4 mlU/mL    Narrative    Test performed by:  [facility] [address] [place] [address]  Non-pregnant female . . . . . . . . . . . . . 0-4 (<5) mIU/mL  Equivocal result for early pregnancy . . . . 5-24 mIU/mL  Pregnant Female:  -------------------------------------------------------------  Weeks Post LMP Approximate HCG range(mIU/mL)  (Last Menstrual Period)range  -------------------------------------------------------------  3-4 Weeks . . . . . . . 9- 130 mIU/mL  4-5 Weeks . . . . . . . 75- 2,600 mIU/mL  5-6 Weeks . . . . . . 850- 20,800 mIU/mL  6-7 Weeks . . . . . 4,000-100,200 mIU/mL  7-12 Weeks . . . . . 11,500-289,000 mIU/mL  12-16 Weeks . . . . . 18,300-137,000 mIU/mL  16-29 Weeks. . . . . . 1,400- 53,000 mIU/mL  (2nd Trimester)  29-41 Weeks. . . . . . . 940- 60,000 mIU/mL  (3rd Trimester)  INTERPRETIVE NOTE:  For diagnostic purposes, hCG results should be used  conjunction with other data.  If the hCG level is inconsistent with clinical evidence,  results should be confirmed by an alternate hCG method.  This assay is approved for use in the early detection  of pregnancy only. It is not approved for any other  uses such as tumor marker screening or monitoring.  Beta HCG ranges were updated 2/2007 to reflect  methodology referencing to the 4th World Health  Organization (WHO) International Standard.       If you have any questions, please call the clinic to make an appointment.    Sincerely,    Prasanth Mcmahon MD

## 2018-08-24 NOTE — MR AVS SNAPSHOT
"              After Visit Summary   8/24/2018    Antonette Horton    MRN: [id number]           Patient Information     Date Of Birth          1990        Visit Information        Provider Department      8/24/2018 11:20 AM Prasanth Mcmahon MD [facility]        Today's Diagnoses     Absence of menstruation    -  1    Nausea and vomiting, intractability of vomiting not specified, unspecified vomiting type          Care Instructions    Please come back for labs and let me know how you are doing in about 2 weeks after you are done with hormone medication x 10 days.          Follow-ups after your visit        Follow-up notes from your care team     Return in about 2 weeks (around 9/7/2018), or if symptoms worsen or fail to improve.      Who to contact     Please call your clinic at [phone] to:    Ask questions about your health    Make or cancel appointments    Discuss your medicines    Learn about your test results    Speak to your doctor            Additional Information About Your Visit        Care EveryWhere ID     This is your Care EveryWhere ID. This could be used by other organizations to access your [place] medical records  [id number]        Your Vitals Were     Pulse Temperature Respirations Height Pulse Oximetry BMI (Body Mass Index)    70 99.1  F (37.3  C) (Oral) 16 5' 4.57\" (164 cm) 100% 20.31 kg/m2       Blood Pressure from Last 3 Encounters:   08/24/18 111/72   08/21/18 114/74   07/13/18 115/78    Weight from Last 3 Encounters:   08/24/18 120 lb 6.4 oz (54.6 kg)   08/21/18 122 lb 9.6 oz (55.6 kg)   07/13/18 122 lb 12.8 oz (55.7 kg)              Today, you had the following     No orders found for display         Today's Medication Changes          These changes are accurate as of 8/24/18 12:44 PM.  If you have any questions, ask your nurse or doctor.               Start taking these medicines.        Dose/Directions    ondansetron 4 MG tablet   Commonly known as:  ZOFRAN   Used for:  Nausea and " vomiting, intractability of vomiting not specified, unspecified vomiting type   Started by:  Prasanth Mcmahon MD        Dose:  4 mg   Take 1 tablet (4 mg) by mouth every 8 hours as needed for nausea   Quantity:  18 tablet   Refills:  1       progesterone 200 MG capsule   Commonly known as:  PROMETRIUM   Used for:  Absence of menstruation   Started by:  Prasanth Mcmahon MD        Dose:  400 mg   Take 2 capsules (400 mg) by mouth daily for 10 days   Quantity:  20 capsule   Refills:  0            Where to get your medicines      These medications were sent to [facility]/pharmacy #[id number] - [place], [place] - [address] AT [facility]  [address] [place] [address]    Hours:  24-hours Phone:  [phone]     ondansetron 4 MG tablet    progesterone 200 MG capsule                Primary Care Provider Office Phone # Fax #    Prasanth Mcmahon [phone]       [address] [place] [address]        Equal Access to Services     [facility]: Hadii vivien hutchins hadasho Soomaali, waaxda luqadaha, qaybta kaalmada adeegyada, waxay katharinain haymariajosen manan cuellar . So [facility] [phone].    ATENCIÓN: Si habla español, tiene a kyle disposición servicios gratuitos de asistencia lingüística. Enrique al [phone].    We comply with applicable federal civil rights laws and [place] laws. We do not discriminate on the basis of race, color, national origin, age, disability, sex, sexual orientation, or gender identity.            Thank you!     Thank you for choosing [facility]  for your care. Our goal is always to provide you with excellent care. Hearing back from our patients is one way we can continue to improve our services. Please take a few minutes to complete the written survey that you may receive in the mail after your visit with us. Thank you!             Your Updated Medication List - Protect others around you: Learn how to safely use, store and throw away your medicines at www.disposemymeds.org.           This list is accurate as of 8/24/18 12:44 PM.  Always use your most recent med list.                   Brand Name Dispense Instructions for use Diagnosis    ondansetron 4 MG tablet    ZOFRAN    18 tablet    Take 1 tablet (4 mg) by mouth every 8 hours as needed for nausea    Nausea and vomiting, intractability of vomiting not specified, unspecified vomiting type       progesterone 200 MG capsule    PROMETRIUM    20 capsule    Take 2 capsules (400 mg) by mouth daily for 10 days    Absence of menstruation       SUMAtriptan 50 MG tablet    IMITREX    16 tablet    Take 1 tablet (50 mg) by mouth at onset of headache for migraine May repeat in 2 hours. Max 4 tablets/24 hours.    Nonintractable episodic headache, unspecified headache type

## 2018-08-24 NOTE — PATIENT INSTRUCTIONS
Please come back for labs and let me know how you are doing in about 2 weeks after you are done with hormone medication x 10 days.

## 2018-08-24 NOTE — PROGRESS NOTES
"Antonette comes in today with concern about amenorrhea.  This patient states that she has very irregular periods and her last period was 4 months ago.  She was previously receiving Depakote and her last shot of Depakote was in May 2017.  She did not get a follow-up shot in August 2017.  She states that she has had 1 or 2 menstrual cycles since last August.    She also is concerned about weight loss.  If we go back a year ago she was 116 pounds in July 2017 so she is actually up 6 pounds from that time.  However in May 2081 pounds so she is down 9 pounds from that.  Her weight is essentially the same as it was in March and in July of this year.    Allergies, medications and problem list reviewed and updated as needed in Epic.      REVIEW OF SYSTEMS    General: No fevers  CV: No chest pain   Resp: No shortness  GI: No constipation, or diarrhea.  No nausea or vomiting  : No urinary c/o    /74  Pulse 68  Temp 98.7  F (37.1  C) (Oral)  Resp 20  Ht 5' 4.76\" (164.5 cm)  Wt 122 lb 9.6 oz (55.6 kg)  SpO2 100%  Breastfeeding? No  BMI 20.55 kg/m2      Gen:  Well nourished and in NAD  Neck: supple without lymphadenopathy  CV:  RRR  - no murmurs, rubs, or gallups,   Pulm:  CTAB, no wheezes/rales/rhonchi, good air entry   ABD: soft, nontender  Psych: Euthymic     ASSESSMENT and PLAN:  1. Absence of menstruation  - HCG Qualitative Urine (UPT)  ([facility])  - US Pelvic Complete w Transvaginal; Future  - Prolactin ([facility])  - Estradiol ([facility])  - FSH ([facility])    2. Amenorrhea  Patient has irregular periods.  Her hCG is negative today.  This certainly could be from the previous Depo-Provera shot she received.  I explained to the patient that although they are dosed every 3 months their effects can last longer than this.  We will get additional blood testing and a pelvic ultrasound and have her follow-up thereafter.  The patient is also concerned about weight loss and it looks like her weight has been up and down " but has been stable for the last 6 months.    - HCG Qualitative Urine (UPT)  ([facility])  - US Pelvic Complete w Transvaginal; Future  - Prolactin ([facility])  - Estradiol ([facility])  - FSH ([facility])        Total of 25 minutes was spent in face to face contact with patient with > 50% in counseling and coordination of care.  Options for treatment and/or follow-up care were reviewed with the patient/family who was engaged and actively involved in the decision making process and who verbalized understanding of the options discussed and was satisfied with the final plan.      Willy Chappell

## 2018-08-24 NOTE — LETTER
August 24, 2018      RE: Antonette Horton  [address]   [place] [address]        To whom it concerns:    Antonette is a patient at this clinic.  She suffers from migraines which are triggered by bright sun light.  She finds that tinting the windows in her car protects her from right sunlight and prevents migraine attacks.  She finds the same benefit from wearing sunglasses when she is outdoors.  Feel free to contact us, with her permission, if you need any further information.    Sincerely,    Prasanth Mcmahon MD

## 2018-08-27 NOTE — PROGRESS NOTES
Hello, all the lab results have turned out well.  This is very reassuring in addition to the other lab tests that were recently checked.  As we discussed please let me know after you have taken the medication how you are doing, whether you got a period and if you stopped vomiting, regards, Dr. Prasanth Mcmahon

## 2018-08-29 ENCOUNTER — TELEPHONE (OUTPATIENT)
Dept: FAMILY MEDICINE | Facility: CLINIC | Age: 28
End: 2018-08-29

## 2018-08-29 DIAGNOSIS — K59.00 CONSTIPATION, UNSPECIFIED CONSTIPATION TYPE: Primary | ICD-10-CM

## 2018-08-29 DIAGNOSIS — Z00.00 ROUTINE GENERAL MEDICAL EXAMINATION AT A HEALTH CARE FACILITY: ICD-10-CM

## 2018-08-29 RX ORDER — ASPIRIN 81 MG
100 TABLET, DELAYED RELEASE (ENTERIC COATED) ORAL DAILY
Qty: 60 TABLET | Refills: 1 | Status: SHIPPED | OUTPATIENT
Start: 2018-08-29 | End: 2021-06-28

## 2018-08-29 NOTE — TELEPHONE ENCOUNTER
[facility] phone call message- general phone call:    Reason for call: the pt called to talk to the nurse about medication questions but would not give any more dentales about what she needed      Return call needed: Yes    OK to leave a message on voice mail? Yes    Primary language: English      needed? No    Call taken on August 29, 2018 at 8:05 AM by Prasanth Ashraf

## 2018-08-29 NOTE — TELEPHONE ENCOUNTER
Return call from patient regarding her request. Nurse informed patient that her rx was sent to Dr. Mcmahon to address. She began upset stating she can not wait til tomorrow to get her. Nurse will see if another provider would be will to send in the requested prescriptions. /YASMEEN Ugalde      Routed to Dr. Hong

## 2018-08-29 NOTE — TELEPHONE ENCOUNTER
Patient states she was prescribed a rx for progesterone that she started on Sunday but feels it is causing her constipation. She questions if a rx for a stool softener could be called to her pharmacy. Also states that Dr. Mcmahon was going to send a rx for a multi vit but the pharmacy does not have it. She would like a gummi vit sent to the pharmacy. Patient wants to be notified by my chart when medications have been sent. /YASMEEN Ugalde      Please advise. /YASMEEN Ugalde  Routed to Dr. Mcmahon

## 2018-09-04 DIAGNOSIS — R11.2 NAUSEA AND VOMITING, INTRACTABILITY OF VOMITING NOT SPECIFIED, UNSPECIFIED VOMITING TYPE: ICD-10-CM

## 2018-09-04 RX ORDER — ONDANSETRON 4 MG/1
4 TABLET, FILM COATED ORAL EVERY 8 HOURS PRN
Qty: 30 TABLET | Refills: 1 | Status: SHIPPED | OUTPATIENT
Start: 2018-09-04 | End: 2022-01-03

## 2018-09-16 ENCOUNTER — HEALTH MAINTENANCE LETTER (OUTPATIENT)
Age: 28
End: 2018-09-16

## 2018-09-25 ENCOUNTER — TELEPHONE (OUTPATIENT)
Dept: FAMILY MEDICINE | Facility: CLINIC | Age: 28
End: 2018-09-25

## 2018-09-25 NOTE — TELEPHONE ENCOUNTER
[facility] phone call message- general phone call:    Reason for call: Pt left a message with tony and I explained that we are waiting for Dr Mcmahon to respond.  She said she also had some concerns that she did not include in the message that she wanted to speak with the nurse about.  Pt would not tell me what it was regarding.    Return call needed: Yes    OK to leave a message on voice mail? Yes    Primary language: English      needed? No    Call taken on September 25, 2018 at 10:29 AM by Miguel Gunter

## 2018-09-25 NOTE — TELEPHONE ENCOUNTER
I replied to her [employer] message - I think this is a depo effect, not sure how much else we can do - [employer]

## 2018-09-25 NOTE — TELEPHONE ENCOUNTER
Pt would a call back from Dr. Mcmahon. Pt states the medication is not working and it has been over 2 wks. Per 8/24 visit, if no improvement she will need to be seen again--gave this info to pt and transferred call to appt line.     Routed to DR. Lisandro saha /YASMEEN Raygoza

## 2018-10-02 ENCOUNTER — DOCUMENTATION ONLY (OUTPATIENT)
Dept: FAMILY MEDICINE | Facility: CLINIC | Age: 28
End: 2018-10-02

## 2018-10-02 NOTE — PROGRESS NOTES
Patient there for appointment - needs records asap faxed to her at [phone].    October 2, 2018 at 2:42 pm faxed last 3 office notes, lab results and imaging to [phone]. yessenia

## 2019-01-22 ENCOUNTER — OFFICE VISIT (OUTPATIENT)
Dept: FAMILY MEDICINE | Facility: CLINIC | Age: 29
End: 2019-01-22

## 2019-01-22 VITALS
HEIGHT: 65 IN | RESPIRATION RATE: 24 BRPM | TEMPERATURE: 99.3 F | BODY MASS INDEX: 20.93 KG/M2 | DIASTOLIC BLOOD PRESSURE: 79 MMHG | SYSTOLIC BLOOD PRESSURE: 124 MMHG | HEART RATE: 71 BPM | OXYGEN SATURATION: 100 % | WEIGHT: 125.6 LBS

## 2019-01-22 DIAGNOSIS — N91.2 AMENORRHEA: ICD-10-CM

## 2019-01-22 DIAGNOSIS — R10.84 ABDOMINAL PAIN, GENERALIZED: ICD-10-CM

## 2019-01-22 DIAGNOSIS — K59.00 CONSTIPATION, UNSPECIFIED CONSTIPATION TYPE: ICD-10-CM

## 2019-01-22 DIAGNOSIS — N89.8 VAGINAL DISCHARGE: Primary | ICD-10-CM

## 2019-01-22 LAB
ALBUMIN SERPL BCP-MCNC: 4 G/DL (ref 3.5–5)
ALP SERPL-CCNC: 39 U/L (ref 45–120)
ALT SERPL W/O P-5'-P-CCNC: 17 U/L (ref 0–45)
ANION GAP SERPL CALCULATED.3IONS-SCNC: 8 MMOL/L (ref 5–18)
AST SERPL-CCNC: 15 U/L (ref 0–40)
B-HCG SERPL-ACNC: <2 MLU/ML (ref 0–4)
BACTERIA: NORMAL
BILIRUB SERPL-MCNC: 0.3 MG/DL (ref 0–1)
BUN SERPL-MCNC: 8 MG/DL (ref 8–22)
CALCIUM SERPL-MCNC: 9.1 MG/DL (ref 8.5–10.5)
CHLORIDE SERPL-SCNC: 107 MMOL/L (ref 98–107)
CLUE CELLS: NORMAL
CO2 SERPL-SCNC: 24 MMOL/L (ref 22–31)
CREAT SERPL-MCNC: 0.82 MG/DL (ref 0.6–1.1)
GLUCOSE SERPL-MCNC: 84 MG/DL (ref 70–125)
MOTILE TRICHOMONAS: NEGATIVE
ODOR: NORMAL
PH WET PREP: NORMAL
POTASSIUM SERPL-SCNC: 3.9 MMOL/L (ref 3.5–5)
PROT SERPL-MCNC: 7.2 G/DL (ref 6–8)
SODIUM SERPL-SCNC: 139 MMOL/L (ref 136–145)
TSH SERPL DL<=0.05 MIU/L-ACNC: 2.53 UIU/ML (ref 0.3–5)
WBC WET PREP: NORMAL
YEAST: NORMAL

## 2019-01-22 RX ORDER — METRONIDAZOLE 500 MG/1
500 TABLET ORAL 2 TIMES DAILY
Qty: 14 TABLET | Refills: 0 | Status: SHIPPED | OUTPATIENT
Start: 2019-01-22 | End: 2019-02-06

## 2019-01-22 RX ORDER — POLYETHYLENE GLYCOL 3350 17 G/17G
1 POWDER, FOR SOLUTION ORAL DAILY
Qty: 1530 G | Refills: 3 | Status: SHIPPED | OUTPATIENT
Start: 2019-01-22 | End: 2020-01-22

## 2019-01-22 ASSESSMENT — MIFFLIN-ST. JEOR: SCORE: 1301.21

## 2019-01-22 NOTE — PROGRESS NOTES
"    Subjective: Antonette Horton is a 28 year old who presents today for the following concerns:    She is been nearly amenorrheic now for a few years.  She was previously on Depo Provera.  Reviewing her records it looks like the last shot she received was May 16, 2016.  That means she would have been due for an additional shots in August 2016.  Therefore it has been 17 months since she last would have been due for a Depo shot.  She has been evaluated here with appropriate lab testing.  She did have a withdrawal bleed when she received oral Provera.  After this however she was frustrated with this problem and wanted to be referred to an OB/GYN.  By her remembrance she was referred to OB in the last 6 months and she reports that they did an ultrasound and told her \"everything is fine.\"  I do not have any records for this encounter.  Again today she is very frustrated and would like to be referred back to OB/GYN.    Patient reports intermittent abdominal pain.  She will have this in the left upper quadrant and she is not noticed any relationship to eating or urination.  It is somewhat better after she defecates.  She has fairly hard stools that she has to strain to produce and this is about once a day.  She has not had any blood in the stool and no black, tarry-looking stools.    She has had some pain in the low back in the midline and she has had no alarm symptoms of urinary or fecal incontinence or numbness or tingling in the saddle area or in the lower extremities.    Finally she is concerned about a vaginal infection and did a self collection for wet prep.    PMHX/PSHX/MEDS/ALLERGIES/SHX/FHX reviewed and updated in Epic.   ROS:   General: No fevers, chills   Head: No headache   Ears: No acute change in hearing.   CV: No chest pain or palpitations.   Resp: No shortness of breath. No cough. No hemoptysis.   Objective: /79   Pulse 71   Temp 99.3  F (37.4  C) (Oral)   Resp 24   Ht 1.66 m (5' 5.35\")   Wt 57 kg " (125 lb 9.6 oz)   SpO2 100%   Breastfeeding? No   BMI 20.68 kg/m     Gen: Well nourished and in NAD   HEENT: TMs normal color and landmarks, nasopharynx pink and moist, oropharynx pink and moist  CV: RRR - no murmurs, rubs, or gallups,   Pulm: CTAB, no wheezes/rales/rhonchi, good air entry   ABD: soft, nontender, BS intact  Extrem: no cyanosis, edema or clubbing   Psych: Euthymic    Assessment/ Plan:  1. Vaginal discharge    - Wet Prep (P )  - Chlamydia/Gono Amplified ([facility])  - metroNIDAZOLE (FLAGYL) 500 MG tablet; Take 1 tablet (500 mg) by mouth 2 times daily for 7 days  Dispense: 14 tablet; Refill: 0    2. Amenorrhea    - OB/GYN REFERRAL; Future  - Beta-HCG Quantitative ([facility])  - Thyroid [place] ([facility])    3. Abdominal pain, generalized    - Comprehensive Metabolic ([facility]) - Results > 1 hr    4. Constipation, unspecified constipation type    - magnesium citrate solution; Take 296 mLs by mouth once for 1 dose  Dispense: 296 mL; Refill: 0  - polyethylene glycol (MIRALAX/GLYCOLAX) powder; Take 17 g (1 capful) by mouth daily  Dispense: 1530 g; Refill: 3      Total of 30 minutes was spent in face to face contact with patient with > 50% in counseling and coordination of care.  Options for treatment and/or follow-up care were reviewed with the patient. Antonette Horton was engaged and actively involved in the decision making process. She verbalized understanding of the options discussed and was satisfied with the final plan.      Willy Chappell

## 2019-01-22 NOTE — LETTER
January 24, 2019      Antonette Horton  [address]   [place] [address]        Dear Antonette,    Please see below for your test results.    The wet prep showed BV as you already knew.  These rest of your results are within the normal range for you.  You are not pregnant.     Resulted Orders   Wet Prep ([facility])   Result Value Ref Range    Yeast Wet Prep None none    Motile Trichomonas Wet Prep Negative Negative    Clue Cells Wet Prep Present >20% NONE    WBC WET PREP 2-5 2 - 5    Bacteria Wet Prep Moderate None    pH Wet Prep Not performed 3.8 - 4.5    Odor Wet Prep None NONE   Chlamydia/Gono Amplified ([facility])   Result Value Ref Range    Chlamydia trac,Amplified Prb Negative Negative    N gonorrhoeae,Amplified Prb Negative Negative    Narrative    Test performed by:  [facility] [address] [place] [address]   Beta-HCG Quantitative ([facility])   Result Value Ref Range    Beta hCG, Quantitative <2 0 - 4 mlU/mL    Narrative    Test performed by:  [facility] [address] [place] [address]  Non-pregnant female . . . . . . . . . . . . . 0-4 (<5) mIU/mL  Equivocal result for early pregnancy . . . . 5-24 mIU/mL  Pregnant Female:  -------------------------------------------------------------  Weeks Post LMP Approximate HCG range(mIU/mL)  (Last Menstrual Period)range  -------------------------------------------------------------  3-4 Weeks . . . . . . . 9- 130 mIU/mL  4-5 Weeks . . . . . . . 75- 2,600 mIU/mL  5-6 Weeks . . . . . . 850- 20,800 mIU/mL  6-7 Weeks . . . . . 4,000-100,200 mIU/mL  7-12 Weeks . . . . . 11,500-289,000 mIU/mL  12-16 Weeks . . . . . 18,300-137,000 mIU/mL  16-29 Weeks. . . . . . 1,400- 53,000 mIU/mL  (2nd Trimester)  29-41 Weeks. . . . . . . 940- 60,000 mIU/mL  (3rd Trimester)  INTERPRETIVE NOTE:  For diagnostic purposes, hCG results should be used  conjunction with other data.  If the hCG level is inconsistent with clinical evidence,  results should  be confirmed by an alternate hCG method.  This assay is approved for use in the early detection  of pregnancy only. It is not approved for any other  uses such as tumor marker screening or monitoring.  Beta HCG ranges were updated 2/2007 to reflect  methodology referencing to the 4th World Health  Organization (WHO) International Standard.   Thyroid [place] ([facility])   Result Value Ref Range    TSH 2.53 0.30 - 5.00 uIU/mL    Narrative    Test performed by:  [facility] [address] [place] [address]   Comprehensive Metabolic ([facility]) - Results > 1 hr   Result Value Ref Range    Sodium 139 136 - 145 mmol/L    Potassium 3.9 3.5 - 5.0 mmol/L    Chloride 107 98 - 107 mmol/L    CO2, Total 24 22 - 31 mmol/L    Anion Gap 8 5 - 18 mmol/L    Glucose 84 70 - 125 mg/dL    Urea Nitrogen 8 8 - 22 mg/dL    Creatinine 0.82 0.60 - 1.10 mg/dL    GFR Estimate If Black >60 >60 mL/min/1.73m2    GFR Estimate >60 >60 mL/min/1.73m2    Bilirubin Total 0.3 0.0 - 1.0 mg/dL    Calcium 9.1 8.5 - 10.5 mg/dL    Protein Total 7.2 6.0 - 8.0 g/dL    Albumin 4.0 3.5 - 5.0 g/dL    Alkaline Phosphatase 39 (L) 45 - 120 U/L    AST (SGOT) 15 0 - 40 U/L    ALT (SGPT) 17 0 - 45 U/L    Narrative    Test performed by:  [facility] [address] [place] [address]  Fasting Glucose reference range is 70-99 mg/dL per  American Diabetes Association (ADA) guidelines.       If you have any questions, please call the clinic to make an appointment.    Sincerely,    Willy Chappell MD

## 2019-01-22 NOTE — NURSING NOTE
Chief Complaint   Patient presents with     RECHECK     Having Abdominal Cramps, Vaginal discharge and Constipation     Ruperto Dunn, CMA

## 2019-01-23 ENCOUNTER — TELEPHONE (OUTPATIENT)
Dept: FAMILY MEDICINE | Facility: CLINIC | Age: 29
End: 2019-01-23

## 2019-01-23 DIAGNOSIS — B96.89 BV (BACTERIAL VAGINOSIS): Primary | ICD-10-CM

## 2019-01-23 DIAGNOSIS — N76.0 BV (BACTERIAL VAGINOSIS): Primary | ICD-10-CM

## 2019-01-23 LAB
C TRACH RRNA SPEC QL NAA+PROBE: NEGATIVE
N GONORRHOEA RRNA SPEC QL NAA+PROBE: NEGATIVE

## 2019-01-23 RX ORDER — METRONIDAZOLE 7.5 MG/G
1 GEL VAGINAL DAILY
Qty: 35 G | Refills: 0 | Status: SHIPPED | OUTPATIENT
Start: 2019-01-23 | End: 2019-01-30

## 2019-01-23 NOTE — TELEPHONE ENCOUNTER
[facility] phone call message- patient requesting results:    Test: Lab    Date of test: 1/22/2019    Additional Comments:     OK to leave a message on voice mail? Yes    Primary language: English      needed? No    Call taken on January 23, 2019 at 10:48 AM by Prasanth Ashraf

## 2019-01-23 NOTE — TELEPHONE ENCOUNTER
Gave pt the results of yesterday's labs.     Pt prefers metronidazole cream/ointment over tablets--please send ointment if it is as effective as pills.    Since labs were essentially normal, pt would like to know what is causing her bloating and her amenorrhea. Will she have to do more tests?    Routed to Dr. Chappell--seen pt on 1/22/19.    Component      Latest Ref Rng & Units 1/22/2019   Sodium      136 - 145 mmol/L 139   Potassium      3.5 - 5.0 mmol/L 3.9   Chloride      98 - 107 mmol/L 107   CO2, Total      22 - 31 mmol/L 24   Anion Gap      5 - 18 mmol/L 8   Glucose      70 - 125 mg/dL 84   Urea Nitrogen      8 - 22 mg/dL 8   Creatinine      0.60 - 1.10 mg/dL 0.82   GFR Estimate If Black      >60 mL/min/1.73m2 >60   GFR Estimate      >60 mL/min/1.73m2 >60   Bilirubin Total      0.0 - 1.0 mg/dL 0.3   Calcium      8.5 - 10.5 mg/dL 9.1   Protein Total      6.0 - 8.0 g/dL 7.2   Albumin      3.5 - 5.0 g/dL 4.0   Alkaline Phosphatase      45 - 120 U/L 39 (L)   AST (SGOT)      0 - 40 U/L 15   ALT (SGPT)      0 - 45 U/L 17   Yeast Wet Prep      none None   Motile Trichomonas Wet Prep      Negative Negative   Clue Cells Wet Prep      NONE Present >20%   WBC WET PREP      2 - 5 2-5   Bacteria Wet Prep      None Moderate   pH Wet Prep      3.8 - 4.5 Not performed   Odor Wet Prep      NONE None   Chlamydia trac,Amplified Prb      Negative Negative   N gonorrhoeae,Amplified Prb      Negative Negative   Beta hCG, Quantitative      0 - 4 mlU/mL <2   TSH      0.30 - 5.00 uIU/mL 2.53     /M.Earm,RN

## 2019-01-23 NOTE — PATIENT INSTRUCTIONS
OB/GYN REFERRAL   January 23, 2019 at 9:40 am - prefers latest appointment available any day. Advised will call back with appointment details.    [facility]  Phone: [phone]  Fax: [phone]  At 9:45 am called [facility] - patient needs to call and speak with the Business Office before they are able to schedule with any provider.     At 9:46 am called Antonette and relayed details and gave her the clinic number to call.     Ref[facility] at [phone] including message on cover letter that patient will contact their Business Office. matt

## 2019-01-23 NOTE — RESULT ENCOUNTER NOTE
The wet prep showed BV as you already knew.  These rest of your results are within the normal range for you.  You are not pregnant.    We will help you schedule a referral to an OB/GYN.

## 2019-02-06 DIAGNOSIS — N89.8 VAGINAL DISCHARGE: ICD-10-CM

## 2019-02-06 DIAGNOSIS — K59.00 CONSTIPATION, UNSPECIFIED CONSTIPATION TYPE: ICD-10-CM

## 2019-02-07 RX ORDER — METRONIDAZOLE 500 MG/1
TABLET ORAL
Qty: 14 TABLET | Refills: 0 | Status: SHIPPED | OUTPATIENT
Start: 2019-02-07 | End: 2021-06-28

## 2019-02-07 RX ORDER — MAGNESIUM CARB/ALUMINUM HYDROX 105-160MG
TABLET,CHEWABLE ORAL
Qty: 296 ML | Refills: 0 | Status: SHIPPED | OUTPATIENT
Start: 2019-02-07 | End: 2021-06-28

## 2019-03-08 ENCOUNTER — OFFICE VISIT (OUTPATIENT)
Dept: FAMILY MEDICINE | Facility: CLINIC | Age: 29
End: 2019-03-08
Payer: COMMERCIAL

## 2019-03-08 VITALS
OXYGEN SATURATION: 100 % | WEIGHT: 120.6 LBS | HEIGHT: 66 IN | SYSTOLIC BLOOD PRESSURE: 132 MMHG | BODY MASS INDEX: 19.38 KG/M2 | TEMPERATURE: 99.1 F | HEART RATE: 105 BPM | RESPIRATION RATE: 20 BRPM | DIASTOLIC BLOOD PRESSURE: 91 MMHG

## 2019-03-08 DIAGNOSIS — Z33.1 PREGNANT STATE, INCIDENTAL: ICD-10-CM

## 2019-03-08 DIAGNOSIS — Z32.00 PREGNANCY EXAMINATION OR TEST, PREGNANCY UNCONFIRMED: Primary | ICD-10-CM

## 2019-03-08 LAB — HCG UR QL: POSITIVE

## 2019-03-08 RX ORDER — PNV NO.95/FERROUS FUM/FOLIC AC 28MG-0.8MG
1 TABLET ORAL DAILY
Qty: 100 TABLET | Refills: 3 | Status: SHIPPED | OUTPATIENT
Start: 2019-03-08 | End: 2021-06-28

## 2019-03-08 ASSESSMENT — MIFFLIN-ST. JEOR: SCORE: 1280.85

## 2019-03-08 NOTE — PROGRESS NOTES
"This is a 28-year-old who attends to confirm pregnancy.  She reports that she took 2 home pregnancy test this morning and both were positive.  Her LMP is 1/30/2019.  Prior to that she had about 17 months of amenorrhea secondary to previous Depo use.  She had been referred to OB/GYN who apparently reassured her that all looked well.  She is somewhat ambivalent about being pregnant since her partner had told her he had mixed feelings.  However we discussed the fact that she has been actively wanting to be pregnant for the past several months.  She says she will have a tubal ligation after this pregnancy.    She does have some mild nausea with cigarette smoking.  She does not of any vaginal discharge nor breast symptoms at this time.  Her pregnancy history is G4 para 2012.  She does smoke cigarettes but thinks she can quit by herself.    Objective:  BP (!) 132/91 (BP Location: Left arm, Patient Position: Sitting, Cuff Size: Adult Regular)   Pulse 105   Temp 99.1  F (37.3  C) (Oral)   Resp 20   Ht 1.664 m (5' 5.5\")   Wt 54.7 kg (120 lb 9.6 oz)   LMP 01/30/2019   SpO2 100%   BMI 19.76 kg/m    Her blood pressures borderline today.  Results for orders placed or performed in visit on 03/08/19   HCG Qualitative Urine (UPT)  ([facility])   Result Value Ref Range    HCG Qual Urine POSITIVE Negative     Antonette was seen today for pregnancy test.    Diagnoses and all orders for this visit:    Pregnancy examination or test, pregnancy unconfirmed  -     HCG Qualitative Urine (UPT)  ([facility])  -     US Pelvic w/ Transvaginal; Future    Pregnant state, incidental  -     Prenatal Vit-Fe Fumarate-FA (PRENATAL VITAMIN) 27-0.8 MG TABS; Take 1 tablet by mouth daily      We discussed the importance of quitting smoking and is taking a daily prenatal vitamin.  She has been using OTC multivitamins I recommended making sure she is adequate folate.  We will schedule an early ultrasound to confirm dates and she is happy to follow-up with a " provider at this clinic.

## 2019-03-13 DIAGNOSIS — O21.0 HYPEREMESIS GRAVIDARUM: Primary | ICD-10-CM

## 2019-03-13 DIAGNOSIS — F51.01 PRIMARY INSOMNIA: ICD-10-CM

## 2019-03-13 RX ORDER — ONDANSETRON 4 MG/1
4 TABLET, FILM COATED ORAL EVERY 8 HOURS PRN
Qty: 18 TABLET | Refills: 1 | Status: SHIPPED | OUTPATIENT
Start: 2019-03-13 | End: 2021-06-28

## 2019-03-13 RX ORDER — DIPHENHYDRAMINE HCL 25 MG
25 TABLET ORAL
Qty: 20 TABLET | Refills: 1 | Status: SHIPPED | OUTPATIENT
Start: 2019-03-13 | End: 2021-06-28

## 2019-03-25 DIAGNOSIS — Z32.01 PREGNANCY EXAMINATION OR TEST, POSITIVE RESULT: ICD-10-CM

## 2019-03-25 DIAGNOSIS — Z32.01 PREGNANCY EXAMINATION OR TEST, POSITIVE RESULT: Primary | ICD-10-CM

## 2019-03-27 ENCOUNTER — TELEPHONE (OUTPATIENT)
Dept: FAMILY MEDICINE | Facility: CLINIC | Age: 29
End: 2019-03-27

## 2019-03-27 DIAGNOSIS — N93.9 VAGINAL SPOTTING: Primary | ICD-10-CM

## 2019-03-27 NOTE — TELEPHONE ENCOUNTER
[facility] phone call message- patient requesting results:    Test: Lab    Date of test: 03/25/2019    Additional Comments: Pt is calling for her lab results.     OK to leave a message on voice mail? Yes    Primary language: English      needed? No    Call taken on March 27, 2019 at 10:36 AM by Sunni Khanna

## 2019-03-27 NOTE — TELEPHONE ENCOUNTER
Waiting to hear back from Dr. Mcmahon first.     OB US results.     Routed to Dr. Mcmahon. /YASMEEN Raygoza     DISPLAY PLAN FREE TEXT

## 2019-03-27 NOTE — RESULT ENCOUNTER NOTE
I talked with Antonette over phone and explained that the ultrasound showed fetal size of 5wks 6days (which is smaller than expected by dates) but could not identify a heart beat yet.  The pregnancy is intra-uterine (she was concerned that it might be tubal).  Antonette did tell me that she had some cramping and light spotting after the ultrasound and had gone to the ED on the same day.  I reviewed records and see that they repeated the US at [facility] which found approximately the same results.  They also measured serum HCG at 7,325 and suggested this could be repeated in a few days.  I discussed this over phone with Antonette.      Overall, reassured to continue taking prenatal vitamins, not smoke and that we should repeat US in 2 weeks.  I will place order for serum HCG which can be performed any time after 48 hours to confirm pregnancy is growing.  Antonette agrees with this plan.

## 2019-04-02 ENCOUNTER — TELEPHONE (OUTPATIENT)
Dept: FAMILY MEDICINE | Facility: CLINIC | Age: 29
End: 2019-04-02

## 2019-04-02 NOTE — TELEPHONE ENCOUNTER
[facility] phone call message-patient reporting a symptom:     Symptom: bleeding      Same Day Visit Offered: needs to talk to nurse     Additional comments:     OK to leave message on voice mail? Yes    Primary language: English      needed? No    Call taken on April 2, 2019 at 8:20 AM by Prasanth Ashraf

## 2019-04-02 NOTE — TELEPHONE ENCOUNTER
Pt states that she is more than 6 weeks pregnant and states that she had spotting off and on.  She states that she had ultrasound here on 3/25/19 and there was no baby in the sac, just a yolk sac.  She states that she then went to ER and repeat u/s changed but still no baby seen.  They told her that she did not need D&C.  She states that her quant hcg levels have gone up and down.   She states that now she is having dark red bleeding but it is not filling up the panty liner.  She is having mild cramping.  Appt made with Dr Burnham today at 9:00 AM but then had to cancel because her insurance is inactive.  Pt told Ofe at the  that she is on her way to the ER./AR

## 2019-04-04 NOTE — TELEPHONE ENCOUNTER
Pt seen at [facility].  Quant had decreased to 5,001, hgb 11.6, A+ and neg elicia screen.  She was told to schedule appt on Friday, 4/5/19 at [facility] for ultrasound and follow-up with a provider.  Routed note to Dr Mcmahon./AR

## 2019-05-15 ENCOUNTER — TELEPHONE (OUTPATIENT)
Dept: FAMILY MEDICINE | Facility: CLINIC | Age: 29
End: 2019-05-15

## 2019-05-15 NOTE — TELEPHONE ENCOUNTER
[facility] phone call message- general phone call:    Reason for call:     Pt wants to speak to nurse regarding personal reasons.     Return call needed: Yes    OK to leave a message on voice mail? Yes    Primary language: English      needed? No    Call taken on May 15, 2019 at 3:43 PM by Sunni Khanna

## 2019-05-15 NOTE — TELEPHONE ENCOUNTER
"Spoke with patient who stated that she has been having stomach pains since late Monday night..  She states she \"isn't pregnant\" as she had her period 1 week ago.  She c/o bad diarrhea which started out dark in color (no blood noted) and now is lt green and slimy.  She has been eating more of a BRAT diet.  No fever, n/v.  She did have appointment today in clinic but cancelled it.  Did encourage to make appointment for tomorrow to be seen in clinic.  Also to continue to keep herself hydrated and continue on more of a bland diet.  Did instruct that if over evening/night her symptoms worsen to go to the ED.    Pt verbalizes understanding of the directions and information. Gave pt opportunity to ask additional questions or address concerns.     Is scheduled with Dr. Penny on 5/16/19    Routed to Dr. Mcmahon/IMELDA Bowden RN    "

## 2019-05-16 ENCOUNTER — OFFICE VISIT (OUTPATIENT)
Dept: FAMILY MEDICINE | Facility: CLINIC | Age: 29
End: 2019-05-16
Payer: COMMERCIAL

## 2019-05-16 VITALS
SYSTOLIC BLOOD PRESSURE: 118 MMHG | DIASTOLIC BLOOD PRESSURE: 77 MMHG | WEIGHT: 117 LBS | HEIGHT: 65 IN | HEART RATE: 66 BPM | OXYGEN SATURATION: 99 % | BODY MASS INDEX: 19.49 KG/M2 | TEMPERATURE: 98.8 F | RESPIRATION RATE: 20 BRPM

## 2019-05-16 DIAGNOSIS — A08.4 VIRAL GASTROENTERITIS: Primary | ICD-10-CM

## 2019-05-16 RX ORDER — LOPERAMIDE HYDROCHLORIDE 2 MG/1
2 TABLET ORAL 4 TIMES DAILY PRN
Qty: 30 TABLET | Refills: 0 | Status: SHIPPED | OUTPATIENT
Start: 2019-05-16 | End: 2021-06-28

## 2019-05-16 ASSESSMENT — MIFFLIN-ST. JEOR: SCORE: 1248.65

## 2019-05-16 NOTE — PROGRESS NOTES
Preceptor Attestation:   Patient seen, evaluated and discussed with the resident. I have verified the content of the note, which accurately reflects my assessment of the patient and the plan of care.   Supervising Physician:  Jose Antonio Hong MD

## 2019-05-16 NOTE — PROGRESS NOTES
"       SUBJECTIVE       Antonette Horton is a 28 year old  female with in uncomplicated past medical history who presents with abdominal pain and diarrhea for the last 4 days.  Patient also reports a poor appetite and weight loss. She notes that when she eats she has pain and has to go to the bathroom shortly afterwards.  Patient notes that her stool is dark green and denies any blood or tarry stools.  Patient has been eating a bland diet without much improvement in symptoms. She has also been drinking Pedialyte. She also reports some discomfort around the anus.She has been able to eat rice. No nausea, vomiting, fevers, chills, night sweats, coughing, sick contacts.      Patient presents with:  Diarrhea: diarrhea since Monday night, look green, poor appetite and loosing weight      PMH, Medications and Allergies were reviewed and updated as needed.          OBJECTIVE     Vitals:    05/16/19 1635   BP: 118/77   BP Location: Left arm   Patient Position: Sitting   Cuff Size: Adult Regular   Pulse: 66   Resp: 20   Temp: 98.8  F (37.1  C)   TempSrc: Oral   SpO2: 99%   Weight: 53.1 kg (117 lb)   Height: 1.638 m (5' 4.5\")     Body mass index is 19.77 kg/m .    General :  healthy and alert, no distress  HEENT:  PERRLA  Cardiovascular: regular rate and rhythm, normal S1/S2 no other heart sounds  Respiratory:  CTA, normal respiratory effort  Gastrointestinal:       abdomen soft, non-tender, non-distended, no organomegaly. Hyperactive bowel sounds.     No results found for this or any previous visit (from the past 24 hour(s)).    ASSESSMENT AND PLAN       Antonette was seen today for diarrhea.    Diagnoses and all orders for this visit:    Viral gastroenteritis: Patient with symptoms consistent with viral gastroenteritis, no red flag symptoms, but no fever, nontoxic.  It appears that she is towards the end of the illness.  She is still having fair p.o. intake of solids and good intake of fluids.  At this time we will treat with " Imodium.  Patient also reporting discomfort around the anus with wiping and afterwards.  Recommended use of baby powder.  Follow-up in 1 week if symptoms do not resolve, otherwise follow-up if needed.  -     loperamide (IMODIUM A-D) 2 MG tablet; Take 1 tablet (2 mg) by mouth 4 times daily as needed for diarrhea    Discussed with MD Eduard Dumont PGY 1  [facility]    This note was created with help of Dragon dictation system. Grammatical /typing errors are not intentional.

## 2019-06-19 ENCOUNTER — OFFICE VISIT (OUTPATIENT)
Dept: FAMILY MEDICINE | Facility: CLINIC | Age: 29
End: 2019-06-19
Payer: COMMERCIAL

## 2019-06-19 VITALS
BODY MASS INDEX: 20.14 KG/M2 | WEIGHT: 119.2 LBS | OXYGEN SATURATION: 99 % | SYSTOLIC BLOOD PRESSURE: 114 MMHG | HEART RATE: 77 BPM | RESPIRATION RATE: 20 BRPM | DIASTOLIC BLOOD PRESSURE: 76 MMHG | TEMPERATURE: 98.3 F

## 2019-06-19 DIAGNOSIS — G44.219 EPISODIC TENSION-TYPE HEADACHE, NOT INTRACTABLE: ICD-10-CM

## 2019-06-19 DIAGNOSIS — L30.9 DERMATITIS OF FACE: Primary | ICD-10-CM

## 2019-06-19 NOTE — PROGRESS NOTES
S: Antonette Horton is a 28 year old female who reports rash under nose for 2 weeks, was itchy but  not painful, she does not know it the lesions were blisters. No trauma, ho history of viral rashes/cold sores. Used left overs of  lubrication and  topical steroids but did resolved lesions  Lesions are getting drier by her report   Admits to be in a lot stress     2 Request pain medications for headaches which are occasional and intermittently   not taken any sumatriptan for  Long  Time   Headaches  are not are mostly in upper neck and back of head   No migraine symptomatology now  Patients states that main concern today is rash under nose    PMHX/PSHX/MEDS/ALLERGIES/SHX/FHX reviewed and updated in Epic.      ROS:  General: No fevers, chills  Head: No headache  Ears: No acute change in hearing.    CV: No chest pain or palpitations.  Resp: No shortness of breath.  No cough. No hemoptysis.  GI: No nausea, vomiting, constipation, diarrhea  : No urinary pains    O: /76 (BP Location: Right arm, Patient Position: Sitting)   Pulse 77   Temp 98.3  F (36.8  C) (Oral)   Resp 20   Wt 54.1 kg (119 lb 3.2 oz)   LMP 05/31/2019 (Approximate)   SpO2 99%   BMI 20.14 kg/m     Gen:  Well nourished and in NAD  HEENT: PERRLA; TMs normal color and landmarks; nasopharynx pink and moist; oropharynx pink and moist  Neck: supple without lymphadenopathy  CV:  RRR  - no murmurs, rubs, or gallups,   Pulm:  CTAB, no wheezes/rales/rhonchi, good air entry   ABD: soft, nontender, no masses, no rebound, BS intact throughout  Extrem: no cyanosis, edema or clubbing  Psych: Euthymic    Skin: dry ans scaly rajesh under nose it appears  that were 3 to papules/blisters in process of healing, slight yellow crust    1 Dermatitis/eczema vs herpes type 1   Topical bacitracin   follow-up as needed  2  Head aches/tension :naproxen    Follow-up as needed      RTC routine care or sooner if develops new or worsening symptoms.    Abe Rossi

## 2019-06-19 NOTE — PATIENT INSTRUCTIONS
Dermatitis/ healing   bacitracin   follow-up in 2 weeks    Take naproxen with foods/ for headaches   Stop ibuprofen

## 2019-11-04 ENCOUNTER — HEALTH MAINTENANCE LETTER (OUTPATIENT)
Age: 29
End: 2019-11-04

## 2020-02-16 ENCOUNTER — HEALTH MAINTENANCE LETTER (OUTPATIENT)
Age: 30
End: 2020-02-16

## 2020-04-09 ENCOUNTER — TELEPHONE (OUTPATIENT)
Dept: FAMILY MEDICINE | Facility: CLINIC | Age: 30
End: 2020-04-09

## 2020-04-09 NOTE — TELEPHONE ENCOUNTER
Reached out to patient during COVID19 Clinic outreach. Reassured patient that [facility] is still open and has started implementing phone and video appointments to help patient remain safe at home.     Patient reports the following concerns: None    Per patient request, patient is scheduled for a visit to address their concerns on the following date:  None    Offered MyChart. Patient already active.    Note will be routed to n/a to assist in addressing patient concerns and/or to schedule a visit.     Emily Curtis, CMA

## 2020-05-08 ENCOUNTER — TRANSFERRED RECORDS (OUTPATIENT)
Dept: HEALTH INFORMATION MANAGEMENT | Facility: CLINIC | Age: 30
End: 2020-05-08

## 2020-05-28 ENCOUNTER — COMMUNICATION - HEALTHEAST (OUTPATIENT)
Dept: SCHEDULING | Facility: CLINIC | Age: 30
End: 2020-05-28

## 2020-06-15 ENCOUNTER — VIRTUAL VISIT (OUTPATIENT)
Dept: FAMILY MEDICINE | Facility: CLINIC | Age: 30
End: 2020-06-15
Payer: COMMERCIAL

## 2020-06-15 DIAGNOSIS — N94.9 VAGINAL DISCOMFORT: ICD-10-CM

## 2020-06-15 DIAGNOSIS — R30.0 BURNING WITH URINATION: Primary | ICD-10-CM

## 2020-06-15 DIAGNOSIS — R30.0 BURNING WITH URINATION: ICD-10-CM

## 2020-06-15 DIAGNOSIS — N30.00 ACUTE CYSTITIS WITHOUT HEMATURIA: ICD-10-CM

## 2020-06-15 LAB
BACTERIA: NORMAL
BACTERIA: NORMAL
BILIRUBIN UR: NEGATIVE MG/DL
BLOOD UR: ABNORMAL MG/DL
CASTS: NORMAL /LPF
CLUE CELLS: NORMAL
CRYSTAL URINE: NORMAL /LPF
EPITHELIAL CELLS UR: NORMAL /LPF (ref 0–2)
GLUCOSE URINE: NEGATIVE
KETONES UR QL: NEGATIVE MG/DL
LEUKOCYTE ESTERASE UR: ABNORMAL
MOTILE TRICHOMONAS: NEGATIVE
MUCOUS URINE: NORMAL LPF
NITRITE UR QL STRIP: POSITIVE MG/DL
ODOR: NORMAL
PH UR STRIP: 6 [PH] (ref 4.5–8)
PH WET PREP: NORMAL (ref 3.8–4.5)
PROTEIN UR: ABNORMAL MG/DL
RBC URINE: NORMAL /HPF
SP GR UR STRIP: 1.02 (ref 1–1.03)
UROBILINOGEN UR STRIP-ACNC: 0.2 E.U./DL
WBC URINE: NORMAL /HPF
WBC WET PREP: <2 (ref 2–5)
YEAST: NORMAL

## 2020-06-15 RX ORDER — NITROFURANTOIN 25; 75 MG/1; MG/1
100 CAPSULE ORAL 2 TIMES DAILY
Qty: 10 CAPSULE | Refills: 0 | Status: SHIPPED | OUTPATIENT
Start: 2020-06-15 | End: 2021-06-28

## 2020-06-15 NOTE — PROGRESS NOTES
"Family Medicine Telephone Visit Note             Telephone Visit Consent   Patient was verbally read the following and verbal consent was obtained.    \"Telephone visits are billed at different rates depending on your insurance coverage. During this emergency period, for some insurers they may be billed the same as an in-person visit.  Please reach out to your insurance provider with any questions.  If during the course of the call the physician/provider feels a telephone visit is not appropriate, you will not be charged for this service.\"    Name person giving consent:  Patient   Date verbal consent given:  6/15/2020  Time verbal consent given:  8:30 AM       Chief Complaint   Patient presents with     UTI     bladder infection?? and need to talk to a doctor               HPI   Patients name: Antonette  Appointment start time:  8:53 AM      Concern: Burning with urination   Description of the problem :  Patient recently had a miscarriage.  She underwent a D&C 2 weeks  Ago.  Patient notes burning with urination for the last 2 days.  Patient notes the burning comes with every urination.  She feels it on the inside and not on the outside.  Patient is also having some cramping.  This cramping is also present at rest.  Patient denies any other abdominal pain.  Patient denies any middle back pain or fevers.  Patient has had urinary tract infections in the past that felt similar.  Patient denies any skin lesions.  Patient denies any change in vaginal discharge.  She does note some vaginal discomfort and would like to be checked for a bacterial infection.       Current Outpatient Medications   Medication Sig Dispense Refill     diphenhydrAMINE (BENADRYL) 25 MG tablet Take 1 tablet (25 mg) by mouth nightly as needed for sleep (Patient not taking: Reported on 5/16/2019) 20 tablet 1     docusate sodium (COLACE) 100 MG tablet Take 100 mg by mouth daily (Patient not taking: Reported on 3/8/2019) 60 tablet 1     loperamide (IMODIUM " "A-D) 2 MG tablet Take 1 tablet (2 mg) by mouth 4 times daily as needed for diarrhea (Patient not taking: Reported on 6/19/2019) 30 tablet 0     magnesium citrate 1.745 GM/30ML solution TAKE 296 ML BY MOUTH 1 TIME FOR 1 DOSE (Patient not taking: Reported on 3/8/2019) 296 mL 0     metroNIDAZOLE (FLAGYL) 500 MG tablet TAKE 1 TABLET(500 MG) BY MOUTH TWICE DAILY FOR 7 DAYS (Patient not taking: Reported on 3/8/2019) 14 tablet 0     Multiple Vitamins-Minerals (MULTIVITAMIN GUMMIES ADULTS) CHEW Take 1 chew tab by mouth daily (Patient not taking: Reported on 6/19/2019) 100 tablet 11     naproxen (NAPROSYN) 375 MG tablet Take 1 tablet (375 mg) by mouth 2 times daily (with meals) 21 tablet 0     ondansetron (ZOFRAN) 4 MG tablet Take 1 tablet (4 mg) by mouth every 8 hours as needed for nausea or vomiting (Patient not taking: Reported on 5/16/2019) 18 tablet 1     ondansetron (ZOFRAN) 4 MG tablet Take 1 tablet (4 mg) by mouth every 8 hours as needed for nausea (Patient not taking: Reported on 3/8/2019) 30 tablet 1     Prenatal Vit-Fe Fumarate-FA (PRENATAL VITAMIN) 27-0.8 MG TABS Take 1 tablet by mouth daily (Patient not taking: Reported on 5/16/2019) 100 tablet 3     SUMAtriptan (IMITREX) 50 MG tablet Take 1 tablet (50 mg) by mouth at onset of headache for migraine May repeat in 2 hours. Max 4 tablets/24 hours. (Patient not taking: Reported on 3/8/2019) 16 tablet 1     Allergies   Allergen Reactions     Sulfamethoxazole-Trimethoprim Hives     Bactrim [Sulfamethoxazole W/Trimethoprim] Hives              Review of Systems:     Constitutional, HEENT, cardiovascular, pulmonary, gi and gu systems are negative, except as otherwise noted.         Physical Exam:     There were no vitals taken for this visit.  Estimated body mass index is 20.14 kg/m  as calculated from the following:    Height as of 5/16/19: 1.638 m (5' 4.5\").    Weight as of 6/19/19: 54.1 kg (119 lb 3.2 oz).    Exam:  Constitutional: healthy, alert and no " distress  Psychiatric: mentation appears normal and affect normal/bright    Urinalysis, Micro If ([facility])   Order: [id number]   Status:  Final result   Visible to patient:  Yes (MyChart) Dx:  Burning with urination     Ref Range & Units  9:51 AM 3yr ago     Specific Gravity Urine  1.005 - 1.030  1.025   >1.030 R       pH Urine  4.5 - 8.0  6.0   6.0 R       Leukocyte Esterase UR  NEGATIVE  3+Abnormal          Nitrite Urine  NEGATIVE mg/dL  POSITIVEAbnormal     Negative R       Protein UR  NEGATIVE mg/dL  2+Abnormal          Glucose Urine  NEGATIVE  negative   Negative R       Ketones Urine  NEGATIVE mg/dL  negative   Negative R       Urobilinogen mg/dL  E.U./dL  0.2        Bilirubin UR  NEGATIVE mg/dL  negative        Blood UR  NEGATIVE mg/dL  2+Abnormal        Resulting Agency   [facility]  [facility]            Urine Microscopic ([facility])   Order: [id number]   Status:  Final result   Visible to patient:  No (not released) Dx:  Burning with urination     Ref Range & Units  10:57 AM 9:51 AM     WBC Urine  <5 /hpf  10-25        RBC Urine  <5 /hpf  10-25        Epithelial Cells UR  0 - 2 /lpf  2-5        Mucous Urine  NONE lpf  None        Casts Urine  NONE /lpf  None        Crystal Urine  NONE /lpf  None        Bacteria Wet Prep  None  Few   Few     Resulting Agency   U[facility]  [facility]          Specimen Collected: 06/15/20 10:57 AM  Last Resulted: 06/15/20 11:01 AM            Wet Prep ([facility])   Order: [id number]   Status:  Final result   Visible to patient:  Yes (MyChart) Dx:  Vaginal discomfort   (important suggestion)   Newer results are available. Click to view them now.      Ref Range & Units  9:51 AM 1yr ago     Yeast Wet Prep  none  None   None       Motile Trichomonas Wet Prep  Negative  Negative   Negative       Clue Cells Wet Prep  NONE  None   Present >20%       WBC WET PREP  2 - 5  <2   2-5       Bacteria Wet Prep  None  Few   Moderate       pH Wet Prep  3.8 - 4.5  Not performed   Not performed       Odor  Wet Prep  NONE  None   None     Resulting Agency   [facility]  [facility]          Specimen Collected: 06/15/20  9:51 AM  Last Resulted: 06/15/20 10:22 AM                  Assessment and Plan   Antonette was seen today for uti.    Diagnoses and all orders for this visit:    Burning with urination  -     Cancel: Urinalysis, Micro If ([facility])  -     Urinalysis, Micro If ([facility]); Future    Vaginal discomfort  -     Cancel: Wet Prep ([facility])  -     Wet Prep ([facility]); Future    Acute cystitis without hematuria: UA micro consistent with a urinary tract infection.  Patient has had Macrobid once in the past.  Patient has allergy to Bactrim.  Provided Macrobid 5 days.  Wet prep negative.  -     nitroFURantoin macrocrystal-monohydrate (MACROBID) 100 MG capsule; Take 1 capsule (100 mg) by mouth 2 times daily  -     Urine Culture ([facility])    Refilled medications that would be required in the next 3 months.     After Visit Information:  Patient chose to view AVS via SETVI    No follow-ups on file.    Appointment end time: 9:05 AM  This is a telephone visit that took 12 minutes.      Clinician location:  [facility]    Myra Matos MD  I precepted today with Corbin Gaston.

## 2020-06-15 NOTE — PROGRESS NOTES
Preceptor Attestation:    I talked to the patient on the phone and discussed the patient with the resident. I have verified the content of the note, which accurately reflects my assessment of the patient and the plan of care.   Supervising Physician:  Rj Guerin MD.

## 2020-06-17 LAB — CULTURE: ABNORMAL

## 2020-08-06 ENCOUNTER — TELEPHONE (OUTPATIENT)
Dept: FAMILY MEDICINE | Facility: CLINIC | Age: 30
End: 2020-08-06

## 2020-08-06 ENCOUNTER — VIRTUAL VISIT (OUTPATIENT)
Dept: FAMILY MEDICINE | Facility: CLINIC | Age: 30
End: 2020-08-06
Payer: COMMERCIAL

## 2020-08-06 VITALS — WEIGHT: 120 LBS | BODY MASS INDEX: 19.99 KG/M2 | HEIGHT: 65 IN

## 2020-08-06 DIAGNOSIS — G43.009 MIGRAINE WITHOUT AURA AND WITHOUT STATUS MIGRAINOSUS, NOT INTRACTABLE: Chronic | ICD-10-CM

## 2020-08-06 RX ORDER — SERTRALINE HYDROCHLORIDE 25 MG/1
50 TABLET, FILM COATED ORAL
COMMUNITY
Start: 2020-06-26 | End: 2022-01-20

## 2020-08-06 ASSESSMENT — MIFFLIN-ST. JEOR: SCORE: 1260.2

## 2020-08-06 NOTE — Clinical Note
8/6/2020      RE: Antonette Horton  [address]   [place] [address]       [facility]         SUBJECTIVE       Antonette Horton is a 30 year old female with a PMH of     Patient Active Problem List   Diagnosis     Retroflexion of uterus     presenting to clinic today with a chief complaint of migraines. Today, she is calling in because she needs a letter confirming that she uses tinted car windows to help when she has migraines. Her PCP, Dr. Mcmahon, wrote her a letter in 2018 for the same reason.  She was told that she needed this letter updated every 2 years.    She has about two migraines a week.  The migraines are exacerbated by bright lights, especially when she is driving.  Has been wearing glasses more consistently, which has helped with the migraines.  She was prescribed 16 pills of Imitrex in 2018 for migraines.    Checked in with patient on how she was doing otherwise.  The patient says that she has been seeing Dr. Prieto at [facility] for treatment of depression.    ROS:  See HPI above    Current Outpatient Medications   Medication Sig Dispense Refill     diphenhydrAMINE (BENADRYL) 25 MG tablet Take 1 tablet (25 mg) by mouth nightly as needed for sleep (Patient not taking: Reported on 5/16/2019) 20 tablet 1     docusate sodium (COLACE) 100 MG tablet Take 100 mg by mouth daily (Patient not taking: Reported on 3/8/2019) 60 tablet 1     loperamide (IMODIUM A-D) 2 MG tablet Take 1 tablet (2 mg) by mouth 4 times daily as needed for diarrhea (Patient not taking: Reported on 6/19/2019) 30 tablet 0     magnesium citrate 1.745 GM/30ML solution TAKE 296 ML BY MOUTH 1 TIME FOR 1 DOSE (Patient not taking: Reported on 3/8/2019) 296 mL 0     metroNIDAZOLE (FLAGYL) 500 MG tablet TAKE 1 TABLET(500 MG) BY MOUTH TWICE DAILY FOR 7 DAYS (Patient not taking: Reported on 3/8/2019) 14 tablet 0     Multiple Vitamins-Minerals (MULTIVITAMIN GUMMIES ADULTS) CHEW Take 1 chew tab by mouth daily (Patient not  taking: Reported on 6/19/2019) 100 tablet 11     naproxen (NAPROSYN) 375 MG tablet Take 1 tablet (375 mg) by mouth 2 times daily (with meals) 21 tablet 0     nitroFURantoin macrocrystal-monohydrate (MACROBID) 100 MG capsule Take 1 capsule (100 mg) by mouth 2 times daily 10 capsule 0     ondansetron (ZOFRAN) 4 MG tablet Take 1 tablet (4 mg) by mouth every 8 hours as needed for nausea or vomiting (Patient not taking: Reported on 5/16/2019) 18 tablet 1     ondansetron (ZOFRAN) 4 MG tablet Take 1 tablet (4 mg) by mouth every 8 hours as needed for nausea (Patient not taking: Reported on 3/8/2019) 30 tablet 1     Prenatal Vit-Fe Fumarate-FA (PRENATAL VITAMIN) 27-0.8 MG TABS Take 1 tablet by mouth daily (Patient not taking: Reported on 5/16/2019) 100 tablet 3     SUMAtriptan (IMITREX) 50 MG tablet Take 1 tablet (50 mg) by mouth at onset of headache for migraine May repeat in 2 hours. Max 4 tablets/24 hours. (Patient not taking: Reported on 3/8/2019) 16 tablet 1            OBJECTIVE:       Vitals: There were no vitals filed for this visit.  BMI: There is no height or weight on file to calculate BMI.    GEN: NAD, alert  PSYCH: mentation appears normal, affect normal/bright          ASSESSMENT and PLAN:     1. Migraine without aura and without status migrainosus, not intractable  I discussed this letter with her primary care provider, Dr. milan.  He agreed that it would be appropriate to create a new letter indicating that the patient could have tinted car windows to help with her migraines.  The patient had asked to have specified that the one dose needed to be 5% tinted, however we decided that it was not appropriate to specify the tinting.  If the patient needs further medications for her migraines or has other questions, she was instructed to follow-up with her primary care provider.     Return to clinic as needed.    Options for treatment and/or follow-up care were reviewed with the patient was actively involved in the  "decision making process. Patient verbalized understanding and was in agreement with the plan.    The patient was seen by and discussed with MD Morelia Dior MD PGY2  [facility]  [phone]      Family Medicine Telephone Visit Note               Telephone Visit Consent   Patient was verbally read the following and verbal consent was obtained.    \"Telephone visits are billed at different rates depending on your insurance coverage. During this emergency period, for some insurers they may be billed the same as an in-person visit.  Please reach out to your insurance provider with any questions.  If during the course of the call the physician/provider feels a telephone visit is not appropriate, you will not be charged for this service.\"    Name person giving consent:  Patient   Date verbal consent given:  8/6/2020  Time verbal consent given:  2:25 PM        Chief Complaint   Patient presents with     Forms     letter for tinted windows at 5% for car because of migraines               HPI   Patients name: Antonette  Appointment start time:  2:27 PM    Full HPI and A/P available in the resident's note associated with this visit.     Assessment/plan    After Visit Information:  AVS via MyChart    Return if symptoms worsen or fail to improve.    Appointment end time: 2:35 PM  This is a telephone visit that took 8 minutes.      Clinician location:  [facility]    Morelia Almanza MD  I precepted today with Dr. Little.      Preceptor Attestation:   I talked to the patient on the phone. I discussed the patient with the resident. I have verified the content of the note, which accurately reflects my assessment of the patient and the plan of care.   Supervising Physician:  Florentino Little MD.                     Morelia Almanza MD  "

## 2020-08-06 NOTE — PROGRESS NOTES
Preceptor Attestation:   I talked to the patient on the phone. I discussed the patient with the resident. I have verified the content of the note, which accurately reflects my assessment of the patient and the plan of care.   Supervising Physician:  Florentino Little MD.

## 2020-08-06 NOTE — LETTER
08/06/20    RE: Antonette Horton  [address]   [facility] [address]      To whom it concerns:    Antonette is a patient at this clinic.  She suffers from migraines which are triggered by bright sun light.  She finds that tinting the windows in her car protects her from right sunlight and prevents migraine attacks.  She finds the same benefit from wearing sunglasses when she is outdoors.  Feel free to contact us, with her permission, if you need any further information.    Sincerely,    Morelia Almanza MD

## 2020-08-06 NOTE — TELEPHONE ENCOUNTER
Dr. Mcmahon requesting patient schedule appointment. Spoke with patient, who states she needs this letter today. Transferred to  for scheduling same day appointment. ./LR

## 2020-08-06 NOTE — TELEPHONE ENCOUNTER
Patient requesting new letter written regarding her window tint for migraines, similar to 8/24/18 letter as this was only good for two years. She is requesting this letter explicitly state she can have 5% tinting. Routed to Dr. Mcmahon. ./ANDREEA

## 2020-08-06 NOTE — PROGRESS NOTES
[facility]         SUBJECTIVE       Antonette Horton is a 30 year old female with a PMH of     Patient Active Problem List   Diagnosis     Retroflexion of uterus     presenting to clinic today with a chief complaint of migraines. Today, she is calling in because she needs a letter confirming that she uses tinted car windows to help when she has migraines. Her PCP, Dr. Mcmahon, wrote her a letter in 2018 for the same reason.  She was told that she needed this letter updated every 2 years.    She has about two migraines a week.  The migraines are exacerbated by bright lights, especially when she is driving.  Has been wearing glasses more consistently, which has helped with the migraines.  She was prescribed 16 pills of Imitrex in 2018 for migraines.    Checked in with patient on how she was doing otherwise.  The patient says that she has been seeing Dr. Prieto at [facility] for treatment of depression.    ROS:  See HPI above    Current Outpatient Medications   Medication Sig Dispense Refill     diphenhydrAMINE (BENADRYL) 25 MG tablet Take 1 tablet (25 mg) by mouth nightly as needed for sleep (Patient not taking: Reported on 5/16/2019) 20 tablet 1     docusate sodium (COLACE) 100 MG tablet Take 100 mg by mouth daily (Patient not taking: Reported on 3/8/2019) 60 tablet 1     loperamide (IMODIUM A-D) 2 MG tablet Take 1 tablet (2 mg) by mouth 4 times daily as needed for diarrhea (Patient not taking: Reported on 6/19/2019) 30 tablet 0     magnesium citrate 1.745 GM/30ML solution TAKE 296 ML BY MOUTH 1 TIME FOR 1 DOSE (Patient not taking: Reported on 3/8/2019) 296 mL 0     metroNIDAZOLE (FLAGYL) 500 MG tablet TAKE 1 TABLET(500 MG) BY MOUTH TWICE DAILY FOR 7 DAYS (Patient not taking: Reported on 3/8/2019) 14 tablet 0     Multiple Vitamins-Minerals (MULTIVITAMIN GUMMIES ADULTS) CHEW Take 1 chew tab by mouth daily (Patient not taking: Reported on 6/19/2019) 100 tablet 11     naproxen (NAPROSYN) 375 MG tablet  Take 1 tablet (375 mg) by mouth 2 times daily (with meals) 21 tablet 0     nitroFURantoin macrocrystal-monohydrate (MACROBID) 100 MG capsule Take 1 capsule (100 mg) by mouth 2 times daily 10 capsule 0     ondansetron (ZOFRAN) 4 MG tablet Take 1 tablet (4 mg) by mouth every 8 hours as needed for nausea or vomiting (Patient not taking: Reported on 5/16/2019) 18 tablet 1     ondansetron (ZOFRAN) 4 MG tablet Take 1 tablet (4 mg) by mouth every 8 hours as needed for nausea (Patient not taking: Reported on 3/8/2019) 30 tablet 1     Prenatal Vit-Fe Fumarate-FA (PRENATAL VITAMIN) 27-0.8 MG TABS Take 1 tablet by mouth daily (Patient not taking: Reported on 5/16/2019) 100 tablet 3     SUMAtriptan (IMITREX) 50 MG tablet Take 1 tablet (50 mg) by mouth at onset of headache for migraine May repeat in 2 hours. Max 4 tablets/24 hours. (Patient not taking: Reported on 3/8/2019) 16 tablet 1            OBJECTIVE:       Vitals: There were no vitals filed for this visit.  BMI: There is no height or weight on file to calculate BMI.    GEN: NAD, alert  PSYCH: mentation appears normal, affect normal/bright          ASSESSMENT and PLAN:     1. Migraine without aura and without status migrainosus, not intractable  I discussed this letter with her primary care provider, Dr. milan.  He agreed that it would be appropriate to create a new letter indicating that the patient could have tinted car windows to help with her migraines.  The patient had asked to have specified that the one dose needed to be 5% tinted, however we decided that it was not appropriate to specify the tinting.  If the patient needs further medications for her migraines or has other questions, she was instructed to follow-up with her primary care provider.     Return to clinic as needed.    Options for treatment and/or follow-up care were reviewed with the patient was actively involved in the decision making process. Patient verbalized understanding and was in agreement with  the plan.    The patient was seen by and discussed with MD Morelia Dior MD PGY2  [facility]  [phone]

## 2020-08-06 NOTE — LETTER
August 6, 2020      RE: Antonette Horton  [address]   [facility] [place] [facility] [address]        To whom it concerns:    Antonette is a patient at this clinic.  She suffers from migraines which are triggered by bright sun light.  She finds that tinting the windows in her car protects her from right sunlight and prevents migraine attacks.  She finds the same benefit from wearing sunglasses when she is outdoors.  Feel free to contact us, with her permission, if you need any further information.    Sincerely,    Prasanth Mcmhaon MD

## 2020-08-06 NOTE — PROGRESS NOTES
"Family Medicine Telephone Visit Note               Telephone Visit Consent   Patient was verbally read the following and verbal consent was obtained.    \"Telephone visits are billed at different rates depending on your insurance coverage. During this emergency period, for some insurers they may be billed the same as an in-person visit.  Please reach out to your insurance provider with any questions.  If during the course of the call the physician/provider feels a telephone visit is not appropriate, you will not be charged for this service.\"    Name person giving consent:  Patient   Date verbal consent given:  8/6/2020  Time verbal consent given:  2:25 PM        Chief Complaint   Patient presents with     Forms     letter for tinted windows at 5% for car because of migraines               HPI   Patients name: Antonette  Appointment start time:  2:27 PM    Full HPI and A/P available in the resident's note associated with this visit.     Assessment/plan    After Visit Information:  AVS via MyChart    Return if symptoms worsen or fail to improve.    Appointment end time: 2:35 PM  This is a telephone visit that took 8 minutes.      Clinician location:  [facility]    Morelia Almanza MD  I precepted today with Dr. Little.    "

## 2020-08-06 NOTE — TELEPHONE ENCOUNTER
[facility] phone call message- general phone call:    Reason for call: She needs a call back re a letter she has for tint on her car windows she needs some things/fixed on the letter in her mychart.    Action desired: call back.    Return call needed: Yes    OK to leave a message on voice mail? Yes    Advised patient to response may take up to 2 business days: Yes    Primary language: English      needed? No    Call taken on August 6, 2020 at 8:26 AM by Flaco Head

## 2020-08-06 NOTE — PATIENT INSTRUCTIONS
You should have access to the letter on my chart.  I will also send a hardcopy with my signature in the mail today.  If you have questions or concerns about your migraines, or would like a refill on medication please follow-up with your primary care provider, Dr. Mcmahon, to discuss this.      Thank you for trusting [facility] for your care. It was a pleasure to take care of you!    Morelia Almanza MD PGY2  [facility]      Patient Education     Migraine Headache  This often severe type of headache is different from other types of headaches in that symptoms other than pain occur with the headache. Nausea and vomiting, lightheadedness, sensitivity to light (photophobia), and other visual disturbances are common migraine symptoms. The pain may last from a few hours to several days. It is not clear why migraines occur but certain factors called  triggers  can raise the risk of having a migraine attack. A migraine may be triggered by emotional stress or depression, or by hormone changes during the menstrual cycle. Other triggers include birth control pills, overuse of migraine medicines, alcohol or caffeine, foods with tyramine (such as aged cheese and wine), eyestrain, weather changes, missed meals, or too little or too much sleep.  Home care  Follow these tips when taking care of yourself at home:    Don t drive yourself home if you were given pain medicine for your headache or are having visual symptoms. Instead, have someone else drive you home. Try to sleep when you get home. You should feel much better when you wake up.    Cold can help ease migraine symptoms. Put an ice pack on your forehead or at the base of your skull. Put heat on the back of your neck to help ease any neck spasm.    Drink only clear liquids or eat a light diet until your symptoms get better. This will help you avoid nausea and vomiting.  How to prevent migraines  Pay attention to what seems to  trigger your headache. Try to avoid the triggers when you can. If you have frequent headaches, consider keeping a headache diary. In it, write down what you were doing, feeling, or eating in the hours before each headache. Show this to your healthcare provider to help find the cause of your headaches.  If stress seems to be a trigger for your headaches, figure out what is causing stress in your life. Learn new ways to handle your stress. Ideas include regular exercise, biofeedback, self-hypnosis, yoga, and meditation. Talk with your healthcare provider to find out more information about managing stress. Many books and digital media are also available on this subject.  Tyramine is a substance found in many foods. It can trigger a migraine in some people. These foods contain tyramine:    Chocolate    Yogurt    All cheeses, but especially aged cheeses    Smoked or pickled fish and meat, including herring, caviar, bologna, pepperoni, and salami    Liver    Avocados    Bananas    Figs    Raisins    Red wine  Try staying away from these foods for 1 to 2 months to see if you have fewer headaches.  How to treat future headaches    Take time out at the first sign of a headache, if possible. Find a quiet, dark, comfortable place to sit or lie down. Let yourself relax or sleep.    Put an ice pack on your forehead or on the area of greatest pain. A heating pad and massage may help if you are having a muscle spasm and tightness in your neck.    If you have been prescribed a medicine to stop a migraine headache, use this at the first warning sign of the headache for best results. First signs may be an aura or pain.    If you need to take medicine often for your migraine, talk with your healthcare provider about other ways to prevent your headaches.  Follow-up care  Follow up with your healthcare provider, or as advised. Talk with your provider if you have frequent headaches. He or she can figure out a treatment plan. Ask if you  can have medicine to take at home the next time you get a bad headache. This may keep you from having to visit the emergency department in the future. You may need to see a headache specialist (neurologist) if you continue to have headaches.  When to seek medical advice  Call your healthcare provider right away if any of these occur:    Your head pain gets worse, or doesn t get better within 24 hours    You can t keep liquids down (repeated vomiting)    Pain in your sinuses, ears, or throat    Fever of 100.4  F (38  C) or higher, or as directed by your healthcare provider    Stiff neck    Extreme drowsiness, confusion, or fainting    Dizziness, or dizziness with spinning sensation (vertigo)    Weakness in an arm or leg, or on one side of your face    Difficulty talking or seeing  Date Last Reviewed: 8/1/2016 2000-2019 [employer]. [address] [place] [id number]. All rights reserved. This information is not intended as a substitute for professional medical care. Always follow your healthcare professional's instructions.           Patient Education     Migraine Headache: Stages and Treatment  A migraine headache tends to progress in stages. Learning these stages can help you better understand what is happening. Then you can learn ways to reduce pain and relieve other symptoms. Methods for relieving your symptoms include self-care and medicines.  Migraine stages  Migraines tend to progress through 4 stages. Many people don't have all stages, and stages may differ with each headache:    Prodrome. A few hours to a day or so before the headache, you may feel tired, (yawning many times), uneasy, or mann. You may also feel bloated or crave certain foods.    Aura. Up to an hour before the headache starts, some migraine sufferers experience aura--flashing lights, blind spots, other vision problems, confusion, difficulty speaking, or other neurologic symptoms.    Headache. Moderate to severe pain  "affects one side of the head and then can spread to both sides, often along with nausea. You may be highly sensitive to light, sound, and odors. Vomiting or diarrhea may also happen. This stage lasts 4 to 72 hours.    Postdrome. After your headache ends, you may feel tired, achy, and \"washed out.\" This may last for a day or so.  Self-care during a migraine  Here is what you can do:    Use a cold compress. Wrap a thin cloth around a cold pack, a cold can of soda, or a bag of frozen vegetables. Apply this to your temple or other pain site.    Drink fluids. If nausea makes it hard to drink, try sucking on ice.    Rest. If possible, lie down. Try not to bend over, as this may increase your pain. Sometimes laying in a dark quiet room can help the migraine from being aggravated.      Try caffeine. Some people find that drinking fluids with caffeine, such as coffee or tea, helps to lessen migraine pain.  Using medicines  Work with your healthcare provider to find the right medicines for you. Medicines for migraine may relieve pain (analgesics), relieve nausea, or attack the migraine's root causes (migraine-specific medicines).  Rebound headache  Taking analgesics each day, or even several times a week, may lead to more frequent and severe headaches. These are called rebound headaches. If you think you're having rebound headaches, tell your healthcare provider. He or she can help you safely decrease your medicine. Rebound caffeine withdrawal headaches can also happen. Certain medicines are addictive and can cause rebound headaches when discontinued abruptly.  Date Last Reviewed: 5/1/2018 2000-2019 [employer]. [address] [place] [address], [place] [address]. All rights reserved. This information is not intended as a substitute for professional medical care. Always follow your healthcare professional's instructions.           "

## 2020-08-11 ENCOUNTER — TELEPHONE (OUTPATIENT)
Dept: FAMILY MEDICINE | Facility: CLINIC | Age: 30
End: 2020-08-11

## 2020-08-11 NOTE — TELEPHONE ENCOUNTER
Letter in chart, unclear why patient cannot see it. Copy and pasted in to new letter. Patient will call back if she is unable to see it. ./LR

## 2020-08-11 NOTE — TELEPHONE ENCOUNTER
[facility] phone call message- general phone call:    Reason for call: She was supposed to get a letter in her mychart re her her tints in her windows it was supposed to be in there on Friday she looked today and there is still no note in there.    Action desired: call back.    Return call needed: Yes    OK to leave a message on voice mail? Yes    Advised patient to response may take up to 2 business days: Yes    Primary language: English      needed? No    Call taken on August 11, 2020 at 10:09 AM by Flaco Head

## 2020-09-21 ENCOUNTER — TELEPHONE (OUTPATIENT)
Dept: FAMILY MEDICINE | Facility: CLINIC | Age: 30
End: 2020-09-21

## 2020-09-21 NOTE — TELEPHONE ENCOUNTER
[facility] phone call message- general phone call:    Reason for call: the Pt called to ask to talk to the nurse and would not say why    Return call needed: Yes    OK to leave a message on voice mail? Yes    Primary language: English      needed? No    Call taken on September 21, 2020 at 4:23 PM by Prasanth Ashraf

## 2020-11-22 ENCOUNTER — HEALTH MAINTENANCE LETTER (OUTPATIENT)
Age: 30
End: 2020-11-22

## 2021-05-10 ENCOUNTER — OFFICE VISIT (OUTPATIENT)
Dept: FAMILY MEDICINE | Facility: CLINIC | Age: 31
End: 2021-05-10
Payer: COMMERCIAL

## 2021-05-10 ENCOUNTER — TELEPHONE (OUTPATIENT)
Dept: FAMILY MEDICINE | Facility: CLINIC | Age: 31
End: 2021-05-10

## 2021-05-10 VITALS
BODY MASS INDEX: 22.37 KG/M2 | HEART RATE: 91 BPM | TEMPERATURE: 97.9 F | OXYGEN SATURATION: 99 % | RESPIRATION RATE: 16 BRPM | SYSTOLIC BLOOD PRESSURE: 102 MMHG | WEIGHT: 134.4 LBS | DIASTOLIC BLOOD PRESSURE: 70 MMHG

## 2021-05-10 DIAGNOSIS — L23.89 ALLERGIC CONTACT DERMATITIS DUE TO OTHER AGENTS: Primary | ICD-10-CM

## 2021-05-10 PROCEDURE — 99213 OFFICE O/P EST LOW 20 MIN: CPT | Mod: GC | Performed by: STUDENT IN AN ORGANIZED HEALTH CARE EDUCATION/TRAINING PROGRAM

## 2021-05-10 RX ORDER — HYDROCORTISONE VALERATE 2 MG/G
OINTMENT TOPICAL DAILY PRN
Qty: 45 G | Refills: 0 | Status: SHIPPED | OUTPATIENT
Start: 2021-05-10 | End: 2021-06-28

## 2021-05-10 RX ORDER — PREDNISONE 20 MG/1
40 TABLET ORAL DAILY
Qty: 10 TABLET | Refills: 0 | Status: SHIPPED | OUTPATIENT
Start: 2021-05-10 | End: 2021-06-28

## 2021-05-10 NOTE — PROGRESS NOTES
Preceptor attestation:  Vital signs reviewed: /70   Pulse 91   Temp 97.9  F (36.6  C) (Oral)   Resp 16   Wt 61 kg (134 lb 6.4 oz)   LMP 04/28/2021 (Approximate)   SpO2 99%   BMI 22.37 kg/m      Patient seen, evaluated, and discussed with the resident.  I have verified the content of the note, which accurately reflects my assessment of the patient and the plan of care.    Supervising physician: Pretty Burnham MD  [facility]

## 2021-05-10 NOTE — PROGRESS NOTES
Assessment & Plan     Allergic contact dermatitis due to other agents  Facial rash after using new product, Ponds make up remover cream, on 5/8 with no improvement with topical hydrocortisone and benadryl. Rash worsening now involving larger areas of face and both eyelids, small papules on non erythematous base and no drainage, consistent with allergic contact dermatitis. Antonette has her kid's dance competition on 5/15 and would like the rash to be gone by then. With involvement of eyelids, will trial prednisone burst for 5 days and topical steroids afterwards as needed with instructions to avoid eyelids with topical steroids. No hx of diabetes.   - predniSONE (DELTASONE) 20 MG tablet  Dispense: 10 tablet; Refill: 0  - hydrocortisone valerate (WEST-KIM) 0.2 % external ointment  Dispense: 45 g; Refill: 0    Prednisone 0.5-1mg/kg (27-54mg) every day for 7 days then half taper    Reviewed external notes from urgent care visit on 5/8/21  Prescription drug management     Tobacco Cessation:   reports that she has been smoking cigarettes. She has been smoking about 0.50 packs per day. She has never used smokeless tobacco.  Tobacco Cessation Action Plan: Information offered: Patient not interested at this time    MEDICATIONS:        - Trial of Prednisone 40mg every day for 5 days       -  Hydrocortisone .2 % topical every day as needed after trial of prednisone tablets        - Continue other medications without change  FUTURE APPOINTMENTS:       - Follow-up visit in 2 weeks if rash has not improved, or earlier if worsened.     No follow-ups on file.    Chemo Jin [facility]   Antonette is a 30 year old who presents for the following health issues: Facial rash    Onset of rash was 2 days ago.  Location of the rash: eyelid and face.  Associated symptoms include: dry skin and itching.  Symptoms appear to be worsening.  Therapies tried to improve the rash: Benadryl, hydrocortisone 1%,  topical witch hazel cream all with minimal to mild relief  Previous history of a similar rash? No  Recent exposure history: new skincare products     No diabetes or adrenal problems.     HPI     Review of Systems   Constitutional, HEENT, cardiovascular, pulmonary, GI, , musculoskeletal, neuro, skin, endocrine and psych systems are negative, except as otherwise noted.      Objective    /70   Pulse 91   Temp 97.9  F (36.6  C) (Oral)   Resp 16   Wt 61 kg (134 lb 6.4 oz)   LMP 04/28/2021 (Approximate)   SpO2 99%   BMI 22.37 kg/m    Body mass index is 22.37 kg/m .  Physical Exam   GENERAL: healthy, alert and no distress  EYES: Eyes grossly normal to inspection, PERRL and conjunctivae and sclerae normal  HENT: ear canals and TM's normal, nose and mouth without ulcers or lesions  NECK: no adenopathy, no asymmetry, masses, or scars and thyroid normal to palpation  RESP: lungs clear to auscultation - no rales, rhonchi or wheezes  BREAST: normal without masses, tenderness or nipple discharge and no palpable axillary masses or adenopathy  CV: regular rate and rhythm, normal S1 S2, no S3 or S4, no murmur, click or rub, no peripheral edema and peripheral pulses strong  ABDOMEN: soft, nontender, no hepatosplenomegaly, no masses and bowel sounds normal  MS: no gross musculoskeletal defects noted, no edema  SKIN: Papules on bilateral eyelids, maxilla, and forehead, and cheeks without drainage  NEURO: Normal strength and tone, mentation intact and speech normal  PSYCH: mentation appears normal, affect normal/bright    ----- Service Performed and Documented by Resident or Fellow ------

## 2021-05-10 NOTE — TELEPHONE ENCOUNTER
Patient reports using a new face wash on Saturday after which she broke out into a rash on her face. She went to urgent care (see chart notes) and was told to use Benadryl and Witch Hazel. This has not helped, and the rash has spread - it is now all over the face including her eyelids. No involvement of the eyes themselves, nose, throat, other mucous membranes. She otherwise feels well. No trouble swallowing or difficulty breathing. She is agreeable to come in today for a clinic visit with Dr. Jin. ./ANDREEA

## 2021-05-10 NOTE — PATIENT INSTRUCTIONS
Thank you for coming into the clinic today!  Here is what we discussed:    Take prednisone tablets 20mg 2 tabs every day for 5 days    If rash still persists after prednisone tablets, then use hydrocortisone 0.25% every day as needed on spots on face, avoiding eyelids and other mucosal membranes.     Please follow up in 2 weeks if rash is not improved, or earlier if worsens.     If you have any questions, please call the clinic at [phone].

## 2021-05-10 NOTE — TELEPHONE ENCOUNTER
[facility] phone call message- general phone call:    Reason for call: She would like to speak to a nurse.    Action desired: call back.    Return call needed: Yes    OK to leave a message on voice mail? Yes    Advised patient to response may take up to 2 business days: Yes    Primary language: English      needed? No    Call taken on May 10, 2021 at 8:07 AM by Flaco Head

## 2021-05-31 ENCOUNTER — RECORDS - HEALTHEAST (OUTPATIENT)
Dept: ADMINISTRATIVE | Facility: CLINIC | Age: 31
End: 2021-05-31

## 2021-06-08 NOTE — TELEPHONE ENCOUNTER
RN Triage  Antonette is calling today she is 8+ weeks pregnant. Antonette was supposed to have an ultrasound today but the clinic closed unexpectedly and her appointment was canceled. She states she has been spotting since this morning and cramping a today. Bleeding has decreased some as the morning has gone on but she is still concerned.  She says she has history of miscarriage. Cramping pain which she rates 8/10, worse when walking.     Of note, Antonette was seen in ED on 5/8 with concern for threatened miscarriage.    I advised Antonette to go back to ER for evaluation. She agrees to this plan.    Amina Guzman RN  [facility] Nurse Advisor          COVID 19 Nurse Triage Plan/Patient Instructions    Please be aware that novel coronavirus (COVID-19) may be circulating in the community. If you develop symptoms such as fever, cough, or SOB or if you have concerns about the presence of another infection including coronavirus (COVID-19), please contact your health care provider or visit [email or web address].     Disposition/Instructions    Patient to go to ED and follow protocol based instructions. Follow System Ambulatory Workflow for COVID 19.     Bring Your Own Device:  Please also bring your smart device(s) (smart phones, tablets, laptops) and their charging cables for your personal use and to communicate with your care team during your visit.      Thank you for limiting contact with others, wearing a simple mask to cover your cough, practice good hand hygiene habits and accessing our virtual services where possible to limit the spread of this virus.    For more information about COVID19 and options for caring for yourself at home, please visit the CDC website at https://www.cdc.gov/coronavirus/2019-ncov/about/steps-when-sick.html  For more options for care at [facility], please visit our website at [email or web address]    For more information, please use the [facility]  COVID-19 Website: [email or web address]  [place] Department of Health ([facility]) COVID-19 Hotlines (Interpreters available):      Health questions: Phone Number: [phone] or [phone] and Hours: 7 a.m. to 7 p.m.    Schools and  questions: Phone Number: [phone] or [phone] and Hours 7 a.m. to 7 p.m.

## 2021-06-28 ENCOUNTER — OFFICE VISIT (OUTPATIENT)
Dept: FAMILY MEDICINE | Facility: CLINIC | Age: 31
End: 2021-06-28
Payer: COMMERCIAL

## 2021-06-28 VITALS
HEART RATE: 75 BPM | SYSTOLIC BLOOD PRESSURE: 118 MMHG | DIASTOLIC BLOOD PRESSURE: 77 MMHG | TEMPERATURE: 98.6 F | OXYGEN SATURATION: 97 % | RESPIRATION RATE: 16 BRPM | WEIGHT: 134.4 LBS | BODY MASS INDEX: 22.37 KG/M2

## 2021-06-28 DIAGNOSIS — Z23 NEED FOR VACCINATION: ICD-10-CM

## 2021-06-28 DIAGNOSIS — R30.0 DYSURIA: ICD-10-CM

## 2021-06-28 DIAGNOSIS — Z32.01 PREGNANCY TEST POSITIVE: Primary | ICD-10-CM

## 2021-06-28 DIAGNOSIS — Z11.3 SCREEN FOR STD (SEXUALLY TRANSMITTED DISEASE): ICD-10-CM

## 2021-06-28 DIAGNOSIS — N92.6 MISSED PERIOD: ICD-10-CM

## 2021-06-28 DIAGNOSIS — N92.6 MISSED PERIOD: Primary | ICD-10-CM

## 2021-06-28 LAB
BACTERIA: NORMAL
BACTERIA: NORMAL
BILIRUBIN UR: NEGATIVE MG/DL
BLOOD UR: NEGATIVE MG/DL
CASTS: NORMAL /LPF
CLUE CELLS: NORMAL
CRYSTAL URINE: NORMAL /LPF
EPITHELIAL CELLS UR: NORMAL /LPF (ref 0–2)
GLUCOSE URINE: NEGATIVE
HCG UR QL: POSITIVE
HIV 1+2 AB+HIV1 P24 AG SERPL QL IA: NEGATIVE
KETONES UR QL: NEGATIVE MG/DL
LEUKOCYTE ESTERASE UR: ABNORMAL
MOTILE TRICHOMONAS: NEGATIVE
MUCOUS URINE: NORMAL LPF
NITRITE UR QL STRIP: NEGATIVE MG/DL
ODOR: NORMAL
PH UR STRIP: 5.5 [PH] (ref 4.5–8)
PH WET PREP: NORMAL (ref 3.8–4.5)
PROTEIN UR: NEGATIVE MG/DL
RBC URINE: <2 /HPF
SP GR UR STRIP: >=1.03 (ref 1–1.03)
UROBILINOGEN UR STRIP-ACNC: ABNORMAL E.U./DL
WBC URINE: NORMAL /HPF
WBC WET PREP: NORMAL (ref 2–5)
YEAST: NORMAL

## 2021-06-28 PROCEDURE — 90715 TDAP VACCINE 7 YRS/> IM: CPT | Performed by: STUDENT IN AN ORGANIZED HEALTH CARE EDUCATION/TRAINING PROGRAM

## 2021-06-28 PROCEDURE — 36415 COLL VENOUS BLD VENIPUNCTURE: CPT | Performed by: STUDENT IN AN ORGANIZED HEALTH CARE EDUCATION/TRAINING PROGRAM

## 2021-06-28 PROCEDURE — 99213 OFFICE O/P EST LOW 20 MIN: CPT | Mod: 25 | Performed by: STUDENT IN AN ORGANIZED HEALTH CARE EDUCATION/TRAINING PROGRAM

## 2021-06-28 PROCEDURE — 87210 SMEAR WET MOUNT SALINE/INK: CPT | Performed by: STUDENT IN AN ORGANIZED HEALTH CARE EDUCATION/TRAINING PROGRAM

## 2021-06-28 PROCEDURE — 81025 URINE PREGNANCY TEST: CPT | Performed by: STUDENT IN AN ORGANIZED HEALTH CARE EDUCATION/TRAINING PROGRAM

## 2021-06-28 PROCEDURE — 81001 URINALYSIS AUTO W/SCOPE: CPT | Performed by: STUDENT IN AN ORGANIZED HEALTH CARE EDUCATION/TRAINING PROGRAM

## 2021-06-28 PROCEDURE — 90471 IMMUNIZATION ADMIN: CPT | Performed by: STUDENT IN AN ORGANIZED HEALTH CARE EDUCATION/TRAINING PROGRAM

## 2021-06-28 NOTE — PROGRESS NOTES
Preceptor Attestation:    I discussed the patient with the resident and evaluated the patient in person. I have verified the content of the note, which accurately reflects my assessment of the patient and the plan of care.   Supervising Physician:  Prasanth Mcmahon MD.

## 2021-06-28 NOTE — PROGRESS NOTES
"[facility] Clinic Visit    Assessment/Plan:  Antonette was seen today for pregnancy test.    Diagnoses and all orders for this visit:    Pregnancy test positive  Missed period  Pregnancy test positive. LMP 5/28/21 and date of unprotected intercourse was 6/16/21. She does not wish to continue this pregnancy and opts for termination. Resources were provided for her and she plans to seek care ASAP.   -     HCG Qualitative Urine (UPT)  ([facility])    Screen for STD (sexually transmitted disease)   Had intercourse with a new partner and is unsure of their STD status. She has no fevers, chills, ab symptoms, vaginal pain, genital lesions. She does report that her underwear has seemed more wet than normal. External exam is benign.   -     HIV Ag/Ab Screen [place] ([facility])  -     Syphilis Screen [place] (RPR/VDRL) ([facility])  -     Chlamydia Trachomatis by PCR ([facility])  -     Neisseria Gonorrhoeae by PCR ([facility])  -     Wet Prep ([facility])    Dysuria  UA has 2+ leuks but no nitrites, blood or bacteria. Low likelihood of UTI.  -     Urinalysis, Micro If ([facility])    Need for vaccination  -     TDAP VACCINE (Adacel, Boostrix)  [[id number]]    Options for treatment and follow-up care were reviewed with the patient who was engaged and actively involved in the decision making process, verbalized understanding of the options discussed, and satisfied with the final plan.    Patient was staffed with supervising physician, Dr. Prasanth Mcmahon.     Bryon Cardenas MD PGY3  [facility]    Subjective:  Antonette Horton is a 30 year old female with a PMHx significant for   Patient Active Problem List   Diagnosis     Retroflexion of uterus     Migraine without aura    who presents with concerns about possible pregnancy, possible UTI and STD screen.     She reports that she has been in a long term relationship with her partner and has been unable to get pregnant despite trying. She then had a \"one night stand\" with a " "different person on 6/16/21. Her period has not come on time. She reports regular/predictable periods; she tracks them on her phone. Her LMP was 5/28/21.  She is very remorseful and worried about the possibility of pregnancy since she is still together with her long term partner.     She has had a D&C in the past. Had 2 spontaneous abortions, one in 2019 and one in 2020.    She is unsure about the STD status of the \"one night stand\" partner. She has noticed mild intermittent dysuria and her underwear seems wetter than normal, but does not have regina discharge. She has not noticed any genital lesions, warts, vesicles, wounds, ulcers. No abdominal pain, suprapubic pain, fevers, chills. She would like to be tested for everything today.     ROS:   A complete 12-point ROS was obtained and was negative except as stated above in HPI.    Objective:  Vitals:    06/28/21 1038   BP: 118/77   Pulse: 75   Resp: 16   Temp: 98.6  F (37  C)   TempSrc: Oral   SpO2: 97%   Weight: 61 kg (134 lb 6.4 oz)     Body mass index is 22.37 kg/m .    GEN: NAD, healthy, alert  EYES: grossly normal to inspection  RESP: Breathing comfortably on RA.   : chaperone present. Normal external genitalia without vesicles, ulcerations, verrucae. Speculum exam deferred   PSYCH: mentation appears normal, affect normal/bright    Results for orders placed or performed in visit on 06/28/21 (from the past 24 hour(s))   Urinalysis, Micro If ([facility])   Result Value Ref Range    Specific Gravity Urine >=1.030 1.005 - 1.030    pH Urine 5.5 4.5 - 8.0    Leukocyte Esterase UR 2+ (A) NEGATIVE    Nitrite Urine Negative NEGATIVE mg/dL    Protein UR Negative NEGATIVE mg/dL    Glucose Urine Negative NEGATIVE    Ketones Urine Negative NEGATIVE mg/dL    Urobilinogen mg/dL 0.2 E.U./dL 0.2 E.U./dL E.U./dL    Bilirubin UR Negative NEGATIVE mg/dL    Blood UR Negative NEGATIVE mg/dL   HCG Qualitative Urine (UPT)  ([facility])   Result Value Ref Range    HCG Qual Urine Positive (A) " Negative   Wet Prep ([facility])   Result Value Ref Range    Yeast Wet Prep None none    Motile Trichomonas Wet Prep Negative Negative    Clue Cells Wet Prep Present <20% NONE    WBC WET PREP 2-5 2 - 5    Bacteria Wet Prep Moderate None    pH Wet Prep Not performed 3.8 - 4.5    Odor Wet Prep None NONE

## 2021-06-29 LAB
C TRACH DNA SPEC QL NAA+PROBE: NEGATIVE
N GONORRHOEA DNA SPEC QL NAA+PROBE: POSITIVE
SPECIMEN SOURCE: ABNORMAL
SPECIMEN SOURCE: NORMAL
TREPONEMA ANTIBODY (SYPHILIS): NEGATIVE

## 2021-07-01 ENCOUNTER — OFFICE VISIT (OUTPATIENT)
Dept: FAMILY MEDICINE | Facility: CLINIC | Age: 31
End: 2021-07-01
Payer: COMMERCIAL

## 2021-07-01 VITALS
RESPIRATION RATE: 12 BRPM | SYSTOLIC BLOOD PRESSURE: 119 MMHG | TEMPERATURE: 98.9 F | BODY MASS INDEX: 22.07 KG/M2 | DIASTOLIC BLOOD PRESSURE: 82 MMHG | WEIGHT: 132.6 LBS

## 2021-07-01 DIAGNOSIS — A54.9 GONORRHEA: Primary | ICD-10-CM

## 2021-07-01 PROBLEM — O98.211 GONORRHEA AFFECTING PREGNANCY IN FIRST TRIMESTER: Status: ACTIVE | Noted: 2021-06-28

## 2021-07-01 PROCEDURE — 96372 THER/PROPH/DIAG INJ SC/IM: CPT | Performed by: FAMILY MEDICINE

## 2021-07-01 PROCEDURE — 99213 OFFICE O/P EST LOW 20 MIN: CPT | Mod: 25 | Performed by: STUDENT IN AN ORGANIZED HEALTH CARE EDUCATION/TRAINING PROGRAM

## 2021-07-01 RX ORDER — CEPHALEXIN 500 MG/1
CAPSULE ORAL
COMMUNITY
Start: 2021-06-30 | End: 2022-01-20

## 2021-07-01 NOTE — PROGRESS NOTES
Preceptor Attestation:   Patient seen, evaluated and discussed with the resident. I have verified the content of the note, which accurately reflects my assessment of the patient and the plan of care.   Supervising Physician:  Rj Guerin MD

## 2021-07-01 NOTE — PROGRESS NOTES
Assessment & Plan     Gonorrhea  Tested positive for gonorrhea, negative for chlamydia. Patient's partner is encouraged to go to urgent care or a free clinic for treatment. She declines birth control at this time. She was counseled to resume her Keflex tomorrow. She will schedule an annual physical in 2-3 months and do a test of cure at that time.  - cefTRIAXone (ROCEPHIN) injection 500 mg     See Patient Instructions    No follow-ups on file.    Hiram Bee MD  [facility] YULIET Whitman is a 30 year old who presents for the following health issues   HPI     Patient is here to discuss results.    She tested positive for Gonorrhea and pregnancy. It is uncertain where the pregnancy is located. Her LMP was 5/28/21. She has an appointment with OB for a repeat ultrasound. She is not currently financially stable and would like to terminate this pregnancy even if it were in her uterus. [facility] is [facility] with [facility]. She had a D&C last year for a miscarriage when the fetus stopped growing after 7 weeks. She declines contraception including condoms.     No fevers, chills, went to the ED for nausea. She has only had one partner in the last year. No purulent discharge, spotting, bleeding, cramping. She was prescribed Keflex in the ED for a UTI. She did not take her Keflex today. Patient's partner does not have insurance.    Review of Systems   Constitutional, HEENT, cardiovascular, pulmonary, gi and gu systems are negative, except as otherwise noted.      Objective    /82 (BP Location: Right arm, Patient Position: Sitting, Cuff Size: Adult Regular)   Temp 98.9  F (37.2  C) (Oral)   Resp 12   Wt 60.1 kg (132 lb 9.6 oz)   LMP 05/28/2021   BMI 22.07 kg/m    Body mass index is 22.07 kg/m .  Physical Exam   GENERAL: healthy, alert and no distress  NECK: no adenopathy, no asymmetry, masses, or scars and thyroid normal to palpation  RESP: lungs clear to  auscultation - no rales, rhonchi or wheezes  CV: regular rate and rhythm, normal S1 S2, no S3 or S4, no murmur, click or rub, no peripheral edema and peripheral pulses strong  ABDOMEN: soft, nontender, no hepatosplenomegaly, no masses and bowel sounds normal  MS: no gross musculoskeletal defects noted, no edema        ----- Service Performed and Documented by Resident or Fellow ------

## 2021-07-01 NOTE — NURSING NOTE
Notified pt to wait for 10-15 minutes in [facility] in case of any allergic reaction after Rocephin vaccine/injection but pt decline because she has to go  her kids. Sarah GOODWIN    Clinic Administered Medication Documentation    Administrations This Visit     cefTRIAXone (ROCEPHIN) injection 500 mg     Admin Date  07/01/2021 Action  Given Dose  500 mg Route  Intramuscular Site  Right Gluteus Alfredito Administered By  Sarah Jin CMA    Ordering Provider: Rj Guerin MD    Patient Supplied?: No    Comments: manufacture: hospira                  Injectable Medication Documentation    Patient was given Ceftriaxone Sodium (Rocephin). Prior to medication administration, verified patients identity using patient s name and date of birth. Please see MAR and medication order for additional information. Patient instructed to stay in clinic after the injection but patient declined.      Was entire vial of medication used? Yes  Vial/Syringe: Single dose vial  Expiration Date:  06/01/2023  Was this medication supplied by the patient? No    Name of provider who requested the medication administration: Dr. martinez  Name of provider on site (faculty or community preceptor) at the time of performing the medication administration: Dr. Lai    Date of next administration: n/a  Date of next office visit with provider to renew medication plan (must be seen annually): n/a    CHRISTA Nichols

## 2021-07-01 NOTE — PATIENT INSTRUCTIONS
Thank you for discussing your health with us today!    We discussed the following during your visit:    1. We have treated you for gonorrhea. Please schedule an appointment in 2-3 months for repeat testing.  2. Please let us know if we can help you with terminating your pregnancy, birth control, or anything else.  3. Pause the Keflex today, resume tomorrow.     Please make an appointment in 2-3 months to follow up on STI testing, annual physical.    As always, please call the clinic if you have any questions or concerns.    Patient Education     Gonorrhea (Female, Treated)    You have an infection called gonorrhea. This infection is a sexually transmitted infection (STI). It is highly contagious. It is passed by sexual contact with an infected partner. Gonorrhea can infect the internal sex organs (cervix, uterus, or fallopian tubes). Less often, it can infect the mouth, throat, and anus. In rare cases, it can infect the joints, eyes, and other parts of the body.  Women with infections in the cervix may have no symptoms. Or they may have only mild symptoms early in the illness. So, it is possible to pass on this infection without knowing you have it.  When symptoms do occur in a woman, they often start 2 to 10 days after exposure. There may be a thick vaginal discharge and pain or burning when urinating. If the infection spreads to the fallopian tubes, it is called pelvic inflammatory disease (PID). This causes lower abdominal pain and fever. If not treated, gonorrhea can cause infertility (being unable to have children) by scarring the fallopian tubes. PID also raises the risk of having a pregnancy outside the uterus (ectopic pregnancy) in the future.  Gonorrhea can be treated and cured. Treatment is with antibiotic medicine.  Home care    Any sexual partner you have had within the last 2 months needs to be treated, even if there are no symptoms. Your partner should contact his or her healthcare provider or go to an  urgent care clinic or the public health department to be examined and treated.    Don't engage in sexual activity until both you and your partner have finished all antibiotic medicine and you have been told by your healthcare provider that you are no longer contagious.    It is important to take all medicine as directed until it is gone. If not, the illness may come back.    Learn about  safer sex  practices and use these in the future. The safest sex is with a partner who has tested negative and only has sex with you. Latex barriers such as condoms offer protection from spreading some STIs, including gonorrhea, chlamydia, and HIV. But they are not a guarantee.  Follow-up care  Follow up with your healthcare provider, or as advised.  If tests were done, call as directed for results. A follow-up test should be done 4 to 6 weeks after treatment, to be sure the infection has cleared. Follow up with your healthcare provider or the public health department for complete STI screening, including HIV testing.  When to seek medical advice  Call your healthcare provider right away if any of these occur:    No improvement after 3 days of treatment    New or increasing lower abdominal pain or back pain    Unexpected vaginal bleeding    Repeated vomiting    Inability to urinate due to pain    Rash or joint pain    Painful sores on the outer vaginal area    Enlarged, painful lymph nodes (lumps) in the groin    Fever of 100.4 F (38 C) or higher, or as advised  Call 911  Call 911 or get immediate medical care if any of these occur:    Weakness    Dizziness    Fainting  Simon last reviewed this educational content on 6/1/2018 2000-2021 [employer]. All rights reserved. This information is not intended as a substitute for professional medical care. Always follow your healthcare professional's instructions.

## 2021-09-19 ENCOUNTER — HEALTH MAINTENANCE LETTER (OUTPATIENT)
Age: 31
End: 2021-09-19

## 2021-11-08 ENCOUNTER — OFFICE VISIT (OUTPATIENT)
Dept: FAMILY MEDICINE | Facility: CLINIC | Age: 31
End: 2021-11-08
Payer: COMMERCIAL

## 2021-11-08 VITALS
OXYGEN SATURATION: 94 % | RESPIRATION RATE: 16 BRPM | HEART RATE: 75 BPM | DIASTOLIC BLOOD PRESSURE: 74 MMHG | WEIGHT: 133 LBS | BODY MASS INDEX: 22.13 KG/M2 | SYSTOLIC BLOOD PRESSURE: 110 MMHG | TEMPERATURE: 98.1 F

## 2021-11-08 DIAGNOSIS — Z11.3 SCREEN FOR STD (SEXUALLY TRANSMITTED DISEASE): ICD-10-CM

## 2021-11-08 DIAGNOSIS — Z00.00 PREVENTATIVE HEALTH CARE: Primary | ICD-10-CM

## 2021-11-08 LAB
CLUE CELLS: NORMAL
TRICHOMONAS, WET PREP: NORMAL
WBC'S/HIGH POWER FIELD, WET PREP: NORMAL
YEAST, WET PREP: NORMAL

## 2021-11-08 PROCEDURE — 99213 OFFICE O/P EST LOW 20 MIN: CPT | Mod: GC | Performed by: STUDENT IN AN ORGANIZED HEALTH CARE EDUCATION/TRAINING PROGRAM

## 2021-11-08 PROCEDURE — 87210 SMEAR WET MOUNT SALINE/INK: CPT | Performed by: STUDENT IN AN ORGANIZED HEALTH CARE EDUCATION/TRAINING PROGRAM

## 2021-11-08 PROCEDURE — 87491 CHLMYD TRACH DNA AMP PROBE: CPT | Performed by: STUDENT IN AN ORGANIZED HEALTH CARE EDUCATION/TRAINING PROGRAM

## 2021-11-08 PROCEDURE — G0123 SCREEN CERV/VAG THIN LAYER: HCPCS | Performed by: STUDENT IN AN ORGANIZED HEALTH CARE EDUCATION/TRAINING PROGRAM

## 2021-11-08 PROCEDURE — 87624 HPV HI-RISK TYP POOLED RSLT: CPT | Performed by: STUDENT IN AN ORGANIZED HEALTH CARE EDUCATION/TRAINING PROGRAM

## 2021-11-08 PROCEDURE — 87591 N.GONORRHOEAE DNA AMP PROB: CPT | Performed by: STUDENT IN AN ORGANIZED HEALTH CARE EDUCATION/TRAINING PROGRAM

## 2021-11-08 NOTE — PROGRESS NOTES
Assessment & Plan     Preventative health care  Antonette is a 31-year-old woman presenting for STI check and due for pap. Pap with cotesting collected. Will call with results.     Screen for STD (sexually transmitted disease)  Positive for gonorrhea 6/28/21 and completed treatment. Negative for gonorrhea 9/10/21. Requests full STI panel today. No known contacts. Will call with results.   - Neisseria gonorrhoeae PCR  - Chlamydia trachomatis PCR  - HIV Antigen Antibody Combo  - Treponema Abs w Reflex to RPR and Titer  - Gynecologic Cytology (PAP)  - Wet preparation    Patient was discussed with attending physician, Dr. Prasanth Mcmahon MD, who agrees with the assessment and plan.    Asuncion Garcia MD, PGY-2  [facility] Medicine Res[facility]  11/8/2021      Subjective   Antonette Horton is a 31 year old female who presents for the following health issues     Chief Complaint   Patient presents with     STD     Pt is here for an STD check.  She states she is here to just be checked up to be safe.      Was positive for gonorrhea in June, and completed treatment. Would like a repeat check of all STI's just to be safe. Reports no symptoms. Denies dysuria, overt urinary frequency, vaginal discharge, hematuria, abdominal pain.       Objective    Vitals:    11/08/21 1438   BP: 110/74   BP Location: Left arm   Patient Position: Sitting   Cuff Size: Adult Regular   Pulse: 75   Resp: 16   Temp: 98.1  F (36.7  C)   TempSrc: Oral   SpO2: 94%   Weight: 60.3 kg (133 lb)     Body mass index is 22.13 kg/m .  Physical Exam   General: alert, appears comfortable, no acute distress  HEENT: atraumatic, conjunctiva clear without erythema, EOM's intact, no nasal discharge, MMM  Neck: supple  Cardiac: normal rate, appears well perfused  Resp: breathing comfortably on room air, no increased work of breathing  Gyn: normal external anatomy with no suspicious lesions, normal vaginal mucosa, cervix appears multiparous with physiologic discharge with no  abnormal lesions or discharge. Bimanual exam showed nontender retroverted uterus and bilateral ovaries were not able to be palpated   Skin: no rashes or suspicious legions on exposed skin  Neuro: CN's grossly intact  Psych: affect congruent with mood

## 2021-11-09 LAB
C TRACH DNA SPEC QL NAA+PROBE: NEGATIVE
N GONORRHOEA DNA SPEC QL NAA+PROBE: NEGATIVE

## 2021-11-10 LAB
HUMAN PAPILLOMA VIRUS 16 DNA: NEGATIVE
HUMAN PAPILLOMA VIRUS 18 DNA: NEGATIVE
HUMAN PAPILLOMA VIRUS FINAL DIAGNOSIS: NORMAL
HUMAN PAPILLOMA VIRUS OTHER HR: NEGATIVE

## 2021-11-16 LAB
BKR LAB AP GYN ADEQUACY: NORMAL
BKR LAB AP GYN INTERPRETATION: NORMAL
BKR LAB AP HPV REFLEX: NORMAL
BKR LAB AP LMP: NORMAL
BKR LAB AP PREVIOUS ABNORMAL: NORMAL
PATH REPORT.COMMENTS IMP SPEC: NORMAL
PATH REPORT.COMMENTS IMP SPEC: NORMAL
PATH REPORT.RELEVANT HX SPEC: NORMAL

## 2021-11-18 ENCOUNTER — TELEPHONE (OUTPATIENT)
Dept: FAMILY MEDICINE | Facility: CLINIC | Age: 31
End: 2021-11-18
Payer: COMMERCIAL

## 2021-11-18 DIAGNOSIS — N76.0 BV (BACTERIAL VAGINOSIS): Primary | ICD-10-CM

## 2021-11-18 DIAGNOSIS — B96.89 BV (BACTERIAL VAGINOSIS): Primary | ICD-10-CM

## 2021-11-18 RX ORDER — METRONIDAZOLE 7.5 MG/G
1 GEL VAGINAL DAILY
Qty: 70 G | Refills: 0 | Status: SHIPPED | OUTPATIENT
Start: 2021-11-18 | End: 2021-11-23

## 2021-11-18 NOTE — TELEPHONE ENCOUNTER
[facility] phone call message- general phone call:    Reason for call: the Pt called to ask for a nurse to call her back asap . Would not say why    Return call needed: Yes    OK to leave a message on voice mail? Yes    Primary language: English      needed? No    Call taken on November 18, 2021 at 9:44 AM by Prasanth Ashraf

## 2021-11-18 NOTE — TELEPHONE ENCOUNTER
Patient endorsing symptoms consistent with previous infection of Bacterial Vaginosis (dryness, itching, white discharge). She is not sexually active, and tested negative for STIs on 11/8/21. Did also test negative for BV on this day, however symptoms have started since then.     She wonders if Dr. Mcmahon would send in Flagyl GEL to the pharmacy for her without a visit.    Routed to Dr. Mcmahon. ./ANDREEA

## 2021-12-03 ENCOUNTER — OFFICE VISIT (OUTPATIENT)
Dept: FAMILY MEDICINE | Facility: CLINIC | Age: 31
End: 2021-12-03
Payer: COMMERCIAL

## 2021-12-03 VITALS
HEART RATE: 76 BPM | SYSTOLIC BLOOD PRESSURE: 110 MMHG | RESPIRATION RATE: 16 BRPM | BODY MASS INDEX: 21.97 KG/M2 | WEIGHT: 132 LBS | TEMPERATURE: 99.1 F | DIASTOLIC BLOOD PRESSURE: 74 MMHG | OXYGEN SATURATION: 100 %

## 2021-12-03 DIAGNOSIS — R30.0 DYSURIA: ICD-10-CM

## 2021-12-03 DIAGNOSIS — Z32.00 PREGNANCY EXAMINATION OR TEST, PREGNANCY UNCONFIRMED: Primary | ICD-10-CM

## 2021-12-03 DIAGNOSIS — O21.9 VOMITING OR NAUSEA OF PREGNANCY: ICD-10-CM

## 2021-12-03 LAB
ALBUMIN UR-MCNC: ABNORMAL MG/DL
APPEARANCE UR: CLEAR
BACTERIA #/AREA URNS HPF: ABNORMAL /HPF
BILIRUB UR QL STRIP: NEGATIVE
CLUE CELLS: ABNORMAL
COLOR UR AUTO: YELLOW
GLUCOSE UR STRIP-MCNC: NEGATIVE MG/DL
HCG UR QL: POSITIVE
HGB UR QL STRIP: NEGATIVE
KETONES UR STRIP-MCNC: NEGATIVE MG/DL
LEUKOCYTE ESTERASE UR QL STRIP: NEGATIVE
MUCOUS THREADS #/AREA URNS LPF: PRESENT /LPF
NITRATE UR QL: NEGATIVE
PH UR STRIP: 7 [PH] (ref 5–8)
RBC #/AREA URNS AUTO: ABNORMAL /HPF
SP GR UR STRIP: 1.02 (ref 1–1.03)
SPERM #/AREA URNS HPF: PRESENT /HPF
SQUAMOUS #/AREA URNS AUTO: ABNORMAL /LPF
TRICHOMONAS, WET PREP: ABNORMAL
UROBILINOGEN UR STRIP-ACNC: 0.2 E.U./DL
WBC #/AREA URNS AUTO: ABNORMAL /HPF
WBC'S/HIGH POWER FIELD, WET PREP: ABNORMAL
YEAST, WET PREP: ABNORMAL

## 2021-12-03 PROCEDURE — 87210 SMEAR WET MOUNT SALINE/INK: CPT | Performed by: FAMILY MEDICINE

## 2021-12-03 PROCEDURE — 99214 OFFICE O/P EST MOD 30 MIN: CPT | Performed by: FAMILY MEDICINE

## 2021-12-03 PROCEDURE — 81001 URINALYSIS AUTO W/SCOPE: CPT | Performed by: FAMILY MEDICINE

## 2021-12-03 PROCEDURE — 81025 URINE PREGNANCY TEST: CPT | Performed by: FAMILY MEDICINE

## 2021-12-03 RX ORDER — ONDANSETRON 4 MG/1
4 TABLET, ORALLY DISINTEGRATING ORAL EVERY 8 HOURS PRN
Qty: 30 TABLET | Refills: 3 | Status: SHIPPED | OUTPATIENT
Start: 2021-12-03 | End: 2022-01-20

## 2021-12-03 NOTE — PROGRESS NOTES
Antonette was seen today for pregnancy test and other.    Diagnoses and all orders for this visit:    Pregnancy examination or test, pregnancy unconfirmed  -     Cancel: HCG qualitative urine; Future  -     Wet preparation  -     HCG qualitative urine  -     UA reflex to Microscopic  -     Urine Microscopic Exam  -     Urine Culture; Future  -     ondansetron (ZOFRAN-ODT) 4 MG ODT tab; Take 1 tablet (4 mg) by mouth every 8 hours as needed for nausea  -     US OB <14 WKS SINGLE OR FIRST GESTATION; Future    Vomiting or nausea of pregnancy      I have verified that her pregnancy test is positive.  Based on her LMP, this would mean she is just over 5 weeks pregnant and would have an BUCK of 2022.  She is mildly tearful in discussing this result and asks me to print this out so she can discuss it further with her partner.  She intends to keep the pregnancy having some regrets about the elective  a number of months ago.    She does report that her last period was somewhat unusual and therefore I have recommended we get an early ultrasound to confirm her dates, suggesting that ideally if we can wait about 2 weeks the fetal features will be better identifiable.    We will send a urine culture I will notify her of the results.  Based on today's results, there is no evidence of BV and it seems unlikely that she has a UTI.    She is eating some spicy and greasy fast foods and of advised her to avoid these.  Have given her some patient education concerning best diet when experiencing nausea and vomiting of pregnancy.  I have also prescribed ondansetron for as needed use.  I suggested that she follow-up in about 2 weeks time for a first OB visit and placed an order for an ultrasound around that time also.  I will notify our maternity care nurse of her pregnancy.  She is happy to follow-up with a clinic provider for her care and to have delivery happen at [facility].  She smokes marijuana and cigarettes and I have  encouraged her to discontinue both while she is pregnant.    Total visit time with patient was 25 mins, all of which was face to face MD time, and over 50% of this time was spent in counseling and coordination of care.  Including post-encounter documentation and orders, total encounter time was 32 mins.      Subjective:  This is a 31-year-old who is well-known to me who attends today believing that she is pregnant.  Her LMP is 10/29/2021.  She believes she is about 1 week late for her period.  Additionally, she has had nausea and vomiting for about 1 week which is disabling.  She feels she cannot go to work today.  She has had no vaginal bleeding.  However she has had some vague discomfort in the vaginal area and questions whether she could have bacterial vaginosis or possibly a urinary tract infection.  Additionally she describes breast fullness and tenderness.    Her pregnancy history is -1-4-2.  Her 2 live children are aged 10 and 12.  She was most recently pregnant in 2021 which ended in elective .  She reports that one of her pregnancies was slightly early at around 36 weeks but both children were delivered vaginally without complication.    Social:  She works as an .  She has a partner who is not the father of her 2 children.  She reports that there is been some dissatisfaction in their relationship because she became pregnant in July by a different male and her current partner found out about this subsequently.  He apparently is ambivalent about whether or not she is pregnant.    Objective:  /74 (BP Location: Left arm, Patient Position: Sitting, Cuff Size: Adult Regular)   Pulse 76   Temp 99.1  F (37.3  C) (Oral)   Resp 16   Wt 59.9 kg (132 lb)   LMP 10/29/2021 (Exact Date)   SpO2 100%   Breastfeeding Unknown   BMI 21.97 kg/m    Her vitals are satisfactory  She is feeling mildly nauseated.  She obtains a self swab for wet prep.    Results for orders placed or  performed in visit on 12/03/21   HCG qualitative urine     Status: Abnormal   Result Value Ref Range    hCG Urine Qualitative Positive (A) Negative   UA reflex to Microscopic     Status: Abnormal   Result Value Ref Range    Color Urine Yellow Colorless, Straw, Light Yellow, Yellow    Appearance Urine Clear Clear    Glucose Urine Negative Negative mg/dL    Bilirubin Urine Negative Negative    Ketones Urine Negative Negative mg/dL    Specific Gravity Urine 1.020 1.005 - 1.030    Blood Urine Negative Negative    pH Urine 7.0 5.0 - 8.0    Protein Albumin Urine Trace (A) Negative mg/dL    Urobilinogen Urine 0.2 0.2, 1.0 E.U./dL    Nitrite Urine Negative Negative    Leukocyte Esterase Urine Negative Negative   Urine Microscopic Exam     Status: Abnormal   Result Value Ref Range    Bacteria Urine Few (A) None Seen /HPF    RBC Urine 0-2 0-2 /HPF /HPF    WBC Urine 0-5 0-5 /HPF /HPF    Squamous Epithelials Urine Moderate (A) None Seen /LPF    Mucus Urine Present (A) None Seen /LPF    Sperm Urine Present (A) None Seen /HPF   Wet preparation     Status: Abnormal    Specimen: Vagina; Swab   Result Value Ref Range    Trichomonas Absent Absent    Yeast Absent Absent    Clue Cells Absent Absent    WBCs/high power field 1+ (A) None    Narrative    Bacteria few, odor none

## 2021-12-03 NOTE — LETTER
RETURN TO WORK/SCHOOL FORM    12/3/2021    Re: Antonette Horton  1990      To Whom It May Concern:     Antonette Horton was seen in clinic today.  She may return to work without restrictions on 12/6/21 if she is able.       Restrictions:  None      Prasanth Mcmahon MD  12/3/2021 10:38 AM

## 2021-12-03 NOTE — Clinical Note
UC was not done as the lab was not aware that it was needed.  Lab told me that when a UC is added on after the UA with micro will need to call lab to let them know as they do not get the UC order unless it comes with the UA.  Antonette will come tomorrow for UC.  See my nursing note for details./NG

## 2021-12-03 NOTE — PATIENT INSTRUCTIONS
Patient Education     Hyperemesis Gravidarum  Hyperemesis gravidarum is a severe form of morning sickness that can affect some women during pregnancy. It may develop around the 5th week and last until the 16th week of pregnancy. In some women, it may last longer. Symptoms include severe nausea and vomiting. This can lead to problems such as weight loss and dehydration.  It's not clear what causes hyperemesis gravidarum. It may be from rising hormone levels early in the pregnancy. It can be a serious threat to mother and fetus if symptoms are severe. Follow the advice below carefully. If your symptoms don't get better with home care measures, you may need to stay in the hospital. In the hospital, you may get IV (intravenous) fluids and medicines.  Home care  Diet    Keep a log of the foods you eat and how they affect your symptoms. Don't eat foods that trigger your symptoms.    Eat small meals often throughout the day rather than 3 large meals. This can help keep your stomach from being empty. An empty stomach can make nausea worse.    Choose foods that are high in carbohydrates. Eating foods high in protein may also help. Limit greasy or spicy foods.    Before getting out of bed in the morning, try eating crackers or dry toast. This may help settle your stomach.    Drink cold, clear liquids. Drinking small amounts of liquids with electrolytes, such as sports drinks, may help as well.  Medicine    If needed, your healthcare provider may prescribe certain medicines to help ease nausea and vomiting. Your provider may suggest vitamin B6 and mireya. Don t try any over-the-counter medicines or home remedies without talking with your provider first.  Follow-up care  Follow up with your healthcare provider, or as advised.  When to seek medical advice  Call your healthcare provider right away if any of these occur:    Signs of dehydration. These include dry mouth, extreme thirst, dark urine or little urine output, dizziness,  weakness, or fainting.    Vomiting that won t stop    Inability to keep down liquids    Frequent diarrhea    Weight loss or no weight gain over a 2-week period    Severe constant pain in the lower right abdomen    Fever of 100.4 F (38 C) or higher, or as directed by your healthcare provider  Simon last reviewed this educational content on 6/1/2018 2000-2021 [employer]. All rights reserved. This information is not intended as a substitute for professional medical care. Always follow your healthcare professional's instructions.             Results for orders placed or performed in visit on 12/03/21   HCG qualitative urine     Status: Abnormal   Result Value Ref Range    hCG Urine Qualitative Positive (A) Negative   UA reflex to Microscopic     Status: Abnormal   Result Value Ref Range    Color Urine Yellow Colorless, Straw, Light Yellow, Yellow    Appearance Urine Clear Clear    Glucose Urine Negative Negative mg/dL    Bilirubin Urine Negative Negative    Ketones Urine Negative Negative mg/dL    Specific Gravity Urine 1.020 1.005 - 1.030    Blood Urine Negative Negative    pH Urine 7.0 5.0 - 8.0    Protein Albumin Urine Trace (A) Negative mg/dL    Urobilinogen Urine 0.2 0.2, 1.0 E.U./dL    Nitrite Urine Negative Negative    Leukocyte Esterase Urine Negative Negative   Urine Microscopic Exam     Status: Abnormal   Result Value Ref Range    Bacteria Urine Few (A) None Seen /HPF    RBC Urine 0-2 0-2 /HPF /HPF    WBC Urine 0-5 0-5 /HPF /HPF    Squamous Epithelials Urine Moderate (A) None Seen /LPF    Mucus Urine Present (A) None Seen /LPF    Sperm Urine Present (A) None Seen /HPF   Wet preparation     Status: Abnormal    Specimen: Vagina; Swab   Result Value Ref Range    Trichomonas Absent Absent    Yeast Absent Absent    Clue Cells Absent Absent    WBCs/high power field 1+ (A) None    Narrative    Bacteria few, odor none     Based on last menstrual period, Estimated Date of Delivery: 8/5/22 and  currently just over 5 weeks pregnant.

## 2021-12-03 NOTE — LETTER
12/3/2021         RE: Antonette Horton  [address]  [address]  [place] [address]    FYI - pregnant, happy to attend a resident and deliver at [facility].    Antonette was seen today for pregnancy test and other.    Diagnoses and all orders for this visit:    Pregnancy examination or test, pregnancy unconfirmed  -     Cancel: HCG qualitative urine; Future  -     Wet preparation  -     HCG qualitative urine  -     UA reflex to Microscopic  -     Urine Microscopic Exam  -     Urine Culture; Future  -     ondansetron (ZOFRAN-ODT) 4 MG ODT tab; Take 1 tablet (4 mg) by mouth every 8 hours as needed for nausea  -     US OB <14 WKS SINGLE OR FIRST GESTATION; Future    Vomiting or nausea of pregnancy      I have verified that her pregnancy test is positive.  Based on her LMP, this would mean she is just over 5 weeks pregnant and would have an BUCK of 2022.  She is mildly tearful in discussing this result and asks me to print this out so she can discuss it further with her partner.  She intends to keep the pregnancy having some regrets about the elective  a number of months ago.    She does report that her last period was somewhat unusual and therefore I have recommended we get an early ultrasound to confirm her dates, suggesting that ideally if we can wait about 2 weeks the fetal features will be better identifiable.    We will send a urine culture I will notify her of the results.  Based on today's results, there is no evidence of BV and it seems unlikely that she has a UTI.    She is eating some spicy and greasy fast foods and of advised her to avoid these.  Have given her some patient education concerning best diet when experiencing nausea and vomiting of pregnancy.  I have also prescribed ondansetron for as needed use.  I suggested that she follow-up in about 2 weeks time for a first OB visit and placed an order for an ultrasound around that time also.  I will notify our maternity care nurse of her  pregnancy.  She is happy to follow-up with a clinic provider for her care and to have delivery happen at [facility].  She smokes marijuana and cigarettes and I have encouraged her to discontinue both while she is pregnant.    Total visit time with patient was 25 mins, all of which was face to face MD time, and over 50% of this time was spent in counseling and coordination of care.  Including post-encounter documentation and orders, total encounter time was 32 mins.      Subjective:  This is a 31-year-old who is well-known to me who attends today believing that she is pregnant.  Her LMP is 10/29/2021.  She believes she is about 1 week late for her period.  Additionally, she has had nausea and vomiting for about 1 week which is disabling.  She feels she cannot go to work today.  She has had no vaginal bleeding.  However she has had some vague discomfort in the vaginal area and questions whether she could have bacterial vaginosis or possibly a urinary tract infection.  Additionally she describes breast fullness and tenderness.    Her pregnancy history is -1-4-2.  Her 2 live children are aged 10 and 12.  She was most recently pregnant in 2021 which ended in elective .  She reports that one of her pregnancies was slightly early at around 36 weeks but both children were delivered vaginally without complication.    Social:  She works as an .  She has a partner who is not the father of her 2 children.  She reports that there is been some dissatisfaction in their relationship because she became pregnant in July by a different male and her current partner found out about this subsequently.  He apparently is ambivalent about whether or not she is pregnant.    Objective:  /74 (BP Location: Left arm, Patient Position: Sitting, Cuff Size: Adult Regular)   Pulse 76   Temp 99.1  F (37.3  C) (Oral)   Resp 16   Wt 59.9 kg (132 lb)   LMP 10/29/2021 (Exact Date)   SpO2 100%    Breastfeeding Unknown   BMI 21.97 kg/m    Her vitals are satisfactory  She is feeling mildly nauseated.  She obtains a self swab for wet prep.    Results for orders placed or performed in visit on 12/03/21   HCG qualitative urine     Status: Abnormal   Result Value Ref Range    hCG Urine Qualitative Positive (A) Negative   UA reflex to Microscopic     Status: Abnormal   Result Value Ref Range    Color Urine Yellow Colorless, Straw, Light Yellow, Yellow    Appearance Urine Clear Clear    Glucose Urine Negative Negative mg/dL    Bilirubin Urine Negative Negative    Ketones Urine Negative Negative mg/dL    Specific Gravity Urine 1.020 1.005 - 1.030    Blood Urine Negative Negative    pH Urine 7.0 5.0 - 8.0    Protein Albumin Urine Trace (A) Negative mg/dL    Urobilinogen Urine 0.2 0.2, 1.0 E.U./dL    Nitrite Urine Negative Negative    Leukocyte Esterase Urine Negative Negative   Urine Microscopic Exam     Status: Abnormal   Result Value Ref Range    Bacteria Urine Few (A) None Seen /HPF    RBC Urine 0-2 0-2 /HPF /HPF    WBC Urine 0-5 0-5 /HPF /HPF    Squamous Epithelials Urine Moderate (A) None Seen /LPF    Mucus Urine Present (A) None Seen /LPF    Sperm Urine Present (A) None Seen /HPF   Wet preparation     Status: Abnormal    Specimen: Vagina; Swab   Result Value Ref Range    Trichomonas Absent Absent    Yeast Absent Absent    Clue Cells Absent Absent    WBCs/high power field 1+ (A) None    Narrative    Bacteria few, odor none             Prasanth Mcmahon MD

## 2021-12-06 NOTE — NURSING NOTE
Assisted pt with scheduling NOB with Dr Garcia and OB ultrasound for 1/3/22.  (pt wanted to do all on the same day).    Pt is wondering if UC results are back?    Checked with lab and since UC was ordered later in the day (separate from UA and micro) the lab was not aware that it was needed so need to collect a new urine sample.    Called pt back and let her know.  She states that she will come tomorrow.  Pt states that she has been having cramping when she needs to urinate and once she urinates the cramps resolve.  She denies any other cramping, vaginal discharge, and bleeding.  She states that her urine is yellow and denies any other concerns.  Routed note to Dr Mcmahon./AR

## 2021-12-07 ENCOUNTER — LAB (OUTPATIENT)
Dept: LAB | Facility: CLINIC | Age: 31
End: 2021-12-07
Payer: COMMERCIAL

## 2021-12-07 DIAGNOSIS — R30.0 DYSURIA: ICD-10-CM

## 2021-12-07 PROCEDURE — 87086 URINE CULTURE/COLONY COUNT: CPT

## 2021-12-07 NOTE — LETTER
December 9, 2021      Antonette Horton  [address]    [place] [address]        Dear MsLinette,    We are writing to inform you of your test results.    Kavon Whitman, you do not have a urinary infection.  I hope you are feeling better and doing OK.  Take care, Prasanth Looney Orders   Urine Culture   Result Value Ref Range    Culture No Growth        If you have any questions or concerns, please call the clinic at the number listed above.       Sincerely,      Prasanth Mcmahon MD

## 2021-12-08 LAB — BACTERIA UR CULT: NO GROWTH

## 2021-12-09 NOTE — RESULT ENCOUNTER NOTE
Hi Antonette, you do not have a urinary infection.  I hope you are feeling better and doing OK.  Take care, Prasanth Mcmahon

## 2021-12-29 ENCOUNTER — TELEPHONE (OUTPATIENT)
Dept: FAMILY MEDICINE | Facility: CLINIC | Age: 31
End: 2021-12-29
Payer: COMMERCIAL

## 2021-12-29 NOTE — TELEPHONE ENCOUNTER
[facility] phone call message- general phone call:    Reason for call: Calling to notify  that she tested positive for covid    Return call needed: No    OK to leave a message on voice mail? No    Primary language: English      needed? No    Call taken on December 29, 2021 at 9:05 AM by Grisel Flores-Cardona

## 2021-12-29 NOTE — TELEPHONE ENCOUNTER
[employer] message sent with care recommendations. Patient also received home care instructions during ED visit yesterday.     Routed to Dr. Mcmahon, PCP, Dr. Garcia, OB provider, YASMEEN Pepe OB coordinator. ./ANDREEA

## 2021-12-30 ENCOUNTER — TELEPHONE (OUTPATIENT)
Dept: FAMILY MEDICINE | Facility: CLINIC | Age: 31
End: 2021-12-30
Payer: COMMERCIAL

## 2021-12-30 NOTE — TELEPHONE ENCOUNTER
[facility] phone call message- general phone call:    Reason for call: She would like a call back from a nurse    Action desired: call back.    Return call needed: Yes    OK to leave a message on voice mail? Yes    Advised patient to response may take up to 2 business days: Yes    Primary language: English      needed? No    Call taken on December 30, 2021 at 8:02 AM by Flaco Head

## 2021-12-30 NOTE — TELEPHONE ENCOUNTER
Outpatient monoclonal antibody distribution continues to be under the discretion of [facility]. We are unable to prescribe this. [employer] message sent with correct website to apply online ([email or web address]).    Spoke with patient who states she is unable to fill this out online as she does not have a computer and her phone is not working well. Filled out for for patient per her request. Qualifies per [facility] due to pregnancy. ./LR

## 2021-12-30 NOTE — TELEPHONE ENCOUNTER
Patient states she tested covid positive 12/29/21 she is 9 weeks pregnant and concerned about miscarriage. She was seen at an ER and it was recommended she receive  monoclonal antibody treatment. Patient was given a 1-800 # to contact MD regarding receiving the treatment but was unable to connect with anyone [phone] given as an alternate number to call. Patient asking if clinic can help with getting her the treatment    Note routed to Dr.Power and Lily ARANA CC  /BTRN

## 2021-12-30 NOTE — TELEPHONE ENCOUNTER
[facility] phone call message- general phone call:    Reason for call:     Action desired: call back.    Return call needed: Yes    OK to leave a message on voice mail? Yes    Advised patient to response may take up to 2 business days: Yes    Primary language: English      needed? No    Call taken on December 30, 2021 at 2:59 PM by Flaco Head

## 2022-01-03 ENCOUNTER — TELEPHONE (OUTPATIENT)
Dept: FAMILY MEDICINE | Facility: CLINIC | Age: 32
End: 2022-01-03

## 2022-01-03 DIAGNOSIS — R11.2 NAUSEA AND VOMITING, INTRACTABILITY OF VOMITING NOT SPECIFIED, UNSPECIFIED VOMITING TYPE: ICD-10-CM

## 2022-01-03 RX ORDER — ONDANSETRON 4 MG/1
4 TABLET, FILM COATED ORAL EVERY 8 HOURS PRN
Qty: 30 TABLET | Refills: 1 | Status: SHIPPED | OUTPATIENT
Start: 2022-01-03 | End: 2022-01-20

## 2022-01-03 NOTE — TELEPHONE ENCOUNTER
Gave info to pt and she is wondering if Dr Mcmahon could send a rx for regular Zofran instead of Zofran ODT as it leaves a horrible taste in her mouth and makes her nauseated.  She states that COVID symptoms are going away but she still has decreased appetite and food does not taste good.  She is forcing herself to eat small frequent meals and is able to keep down fluids.  Told her to call if symptoms are worsening or if you are having any other questions or concerns. Routed note to Dr Mcmahon./AR

## 2022-01-03 NOTE — TELEPHONE ENCOUNTER
Prasanth Mcmahon MD Rubenstein, Lillian; Asuncion Garcia MD; My Keller RN  Caller: Unspecified (Today, 10:42 AM)  Yes definitely support stopping antibiotic given lack of symptoms and no clear true UTI dx.  Could send a urine culture if any concern persists.     Beyond ondansetron scheduled Q6-8h, continue to recommend small meal servings frequently with emphasis on protein and maintain oral hydration.  Thanks, Prasanth Mcmahon

## 2022-01-03 NOTE — TELEPHONE ENCOUNTER
[facility] phone call message- general phone call:    Reason for call: Would like to speak with nurse about medication.     Return call needed: Yes    OK to leave a message on voice mail? Yes    Primary language: English      needed? No    Call taken on January 3, 2022 at 10:42 AM by Grisel Flores-Cardona

## 2022-01-03 NOTE — TELEPHONE ENCOUNTER
"Patient 10 weeks pregnant. Given Cephalexin during 12/29/21 ED visit due to possible UTI. Urine results:    URINALYSIS:  Recent Labs   12/29/21  0641   COLOR Yellow   CLARITY Cloudy A   SPECGRAV 1.025   PHURINE 8.0   UROBILINOGEN Normal   PROTEINUA Trace A   GLUCOSEUA Negative   KETONESUA 15 A   BLOODUA Negative   NITRITE Negative   LEUKOCYTE Negative     MICRO:   Recent Labs   12/29/21  0641   RBCUA 0-2   WBCUA 0-2   BACTERIAUA Moderate A   EPITHELIALUA Few     She has no UTI symptoms - no dysuria, frequency, hematuria.     She shares she is having a hard time tolerating the antibiotic. It is making her nauseous and giving her diarrhea. She already has loss of appetite due to COVID-19, so this is making things worse. She took about 3 full days worth and 2 partial days. Has about 6 pills left. Does have Zofran at home which makes her more nauseous as she cannot tolerate the texture. Having \"a little bit\" of vomiting, but in general is tolerating food well.     She wonders if it is OK to stop the antibiotics, and is there is any other nausea medicine she can try. Routed to Dr. Mcmahon (PCP), Dr. Garcia (OB), OB RN Coordinator. ./LR    "

## 2022-01-04 NOTE — TELEPHONE ENCOUNTER
Called pt to let her know that Dr Mcmahon sent rx for Zofran.  Told her to call if it is not helping or if she has other concerns.  Reviewed when her next appts are./NG

## 2022-01-08 ENCOUNTER — HEALTH MAINTENANCE LETTER (OUTPATIENT)
Age: 32
End: 2022-01-08

## 2022-01-17 ENCOUNTER — ANCILLARY PROCEDURE (OUTPATIENT)
Dept: ULTRASOUND IMAGING | Facility: CLINIC | Age: 32
End: 2022-01-17
Attending: FAMILY MEDICINE
Payer: COMMERCIAL

## 2022-01-17 DIAGNOSIS — Z32.00 PREGNANCY EXAMINATION OR TEST, PREGNANCY UNCONFIRMED: ICD-10-CM

## 2022-01-17 PROCEDURE — 76801 OB US < 14 WKS SINGLE FETUS: CPT | Performed by: RADIOLOGY

## 2022-01-18 NOTE — RESULT ENCOUNTER NOTE
Looks like good news!  Antonette have you scheduled your first appointment yet?  Take care.  Call our nurse My with any questions or concerns - Dr Prasanth Mcmahon

## 2022-01-20 ENCOUNTER — OFFICE VISIT (OUTPATIENT)
Dept: FAMILY MEDICINE | Facility: CLINIC | Age: 32
End: 2022-01-20
Payer: COMMERCIAL

## 2022-01-20 ENCOUNTER — ALLIED HEALTH/NURSE VISIT (OUTPATIENT)
Dept: FAMILY MEDICINE | Facility: CLINIC | Age: 32
End: 2022-01-20
Payer: COMMERCIAL

## 2022-01-20 VITALS
HEART RATE: 74 BPM | RESPIRATION RATE: 16 BRPM | WEIGHT: 136 LBS | SYSTOLIC BLOOD PRESSURE: 111 MMHG | DIASTOLIC BLOOD PRESSURE: 72 MMHG | OXYGEN SATURATION: 100 % | TEMPERATURE: 98.3 F | BODY MASS INDEX: 22.63 KG/M2

## 2022-01-20 DIAGNOSIS — O09.219 PREVIOUS PRETERM LABOR AFFECTING PREGNANCY, ANTEPARTUM: ICD-10-CM

## 2022-01-20 DIAGNOSIS — Z86.19 H/O GONORRHEA: ICD-10-CM

## 2022-01-20 DIAGNOSIS — N89.8 VAGINAL DISCHARGE: ICD-10-CM

## 2022-01-20 DIAGNOSIS — Z86.59 HISTORY OF DEPRESSION: ICD-10-CM

## 2022-01-20 DIAGNOSIS — F12.10 TETRAHYDROCANNABINOL (THC) USE DISORDER, MILD, ABUSE: ICD-10-CM

## 2022-01-20 DIAGNOSIS — O09.90 SUPERVISION OF HIGH RISK PREGNANCY, ANTEPARTUM: Primary | ICD-10-CM

## 2022-01-20 DIAGNOSIS — O09.90 SUPERVISION OF HIGH RISK PREGNANCY, ANTEPARTUM: ICD-10-CM

## 2022-01-20 LAB
ABO/RH(D): NORMAL
ANTIBODY SCREEN: NEGATIVE
CLUE CELLS: ABNORMAL
ERYTHROCYTE [DISTWIDTH] IN BLOOD BY AUTOMATED COUNT: 14.5 % (ref 10–15)
HCT VFR BLD AUTO: 37.4 % (ref 35–47)
HGB BLD-MCNC: 11.4 G/DL (ref 11.7–15.7)
HIV 1+2 AB+HIV1 P24 AG SERPL QL IA: NEGATIVE
MCH RBC QN AUTO: 23 PG (ref 26.5–33)
MCHC RBC AUTO-ENTMCNC: 30.5 G/DL (ref 31.5–36.5)
MCV RBC AUTO: 75 FL (ref 78–100)
PLATELET # BLD AUTO: 314 10E3/UL (ref 150–450)
RBC # BLD AUTO: 4.96 10E6/UL (ref 3.8–5.2)
SPECIMEN EXPIRATION DATE: NORMAL
SPECIMEN EXPIRATION DATE: NORMAL
T PALLIDUM AB SER QL: NONREACTIVE
TRICHOMONAS, WET PREP: ABNORMAL
WBC # BLD AUTO: 7.8 10E3/UL (ref 4–11)
WBC'S/HIGH POWER FIELD, WET PREP: ABNORMAL
YEAST, WET PREP: ABNORMAL

## 2022-01-20 PROCEDURE — 87389 HIV-1 AG W/HIV-1&-2 AB AG IA: CPT | Performed by: STUDENT IN AN ORGANIZED HEALTH CARE EDUCATION/TRAINING PROGRAM

## 2022-01-20 PROCEDURE — 86900 BLOOD TYPING SEROLOGIC ABO: CPT | Performed by: STUDENT IN AN ORGANIZED HEALTH CARE EDUCATION/TRAINING PROGRAM

## 2022-01-20 PROCEDURE — 83655 ASSAY OF LEAD: CPT | Mod: 90 | Performed by: STUDENT IN AN ORGANIZED HEALTH CARE EDUCATION/TRAINING PROGRAM

## 2022-01-20 PROCEDURE — 99207 PR PRENATAL VISIT: CPT | Mod: GC | Performed by: STUDENT IN AN ORGANIZED HEALTH CARE EDUCATION/TRAINING PROGRAM

## 2022-01-20 PROCEDURE — 99000 SPECIMEN HANDLING OFFICE-LAB: CPT | Performed by: STUDENT IN AN ORGANIZED HEALTH CARE EDUCATION/TRAINING PROGRAM

## 2022-01-20 PROCEDURE — 86762 RUBELLA ANTIBODY: CPT | Performed by: STUDENT IN AN ORGANIZED HEALTH CARE EDUCATION/TRAINING PROGRAM

## 2022-01-20 PROCEDURE — 87491 CHLMYD TRACH DNA AMP PROBE: CPT | Performed by: STUDENT IN AN ORGANIZED HEALTH CARE EDUCATION/TRAINING PROGRAM

## 2022-01-20 PROCEDURE — 86780 TREPONEMA PALLIDUM: CPT | Performed by: STUDENT IN AN ORGANIZED HEALTH CARE EDUCATION/TRAINING PROGRAM

## 2022-01-20 PROCEDURE — 87086 URINE CULTURE/COLONY COUNT: CPT | Performed by: STUDENT IN AN ORGANIZED HEALTH CARE EDUCATION/TRAINING PROGRAM

## 2022-01-20 PROCEDURE — 86850 RBC ANTIBODY SCREEN: CPT | Performed by: STUDENT IN AN ORGANIZED HEALTH CARE EDUCATION/TRAINING PROGRAM

## 2022-01-20 PROCEDURE — H1002 CARECOORDINATION PRENATAL: HCPCS

## 2022-01-20 PROCEDURE — 87340 HEPATITIS B SURFACE AG IA: CPT | Performed by: STUDENT IN AN ORGANIZED HEALTH CARE EDUCATION/TRAINING PROGRAM

## 2022-01-20 PROCEDURE — 36415 COLL VENOUS BLD VENIPUNCTURE: CPT | Performed by: STUDENT IN AN ORGANIZED HEALTH CARE EDUCATION/TRAINING PROGRAM

## 2022-01-20 PROCEDURE — 85027 COMPLETE CBC AUTOMATED: CPT | Performed by: STUDENT IN AN ORGANIZED HEALTH CARE EDUCATION/TRAINING PROGRAM

## 2022-01-20 PROCEDURE — 86901 BLOOD TYPING SEROLOGIC RH(D): CPT | Performed by: STUDENT IN AN ORGANIZED HEALTH CARE EDUCATION/TRAINING PROGRAM

## 2022-01-20 PROCEDURE — 87210 SMEAR WET MOUNT SALINE/INK: CPT | Performed by: STUDENT IN AN ORGANIZED HEALTH CARE EDUCATION/TRAINING PROGRAM

## 2022-01-20 PROCEDURE — H1001 ANTEPARTUM MANAGEMENT: HCPCS

## 2022-01-20 PROCEDURE — 99207 PR NO CHARGE NURSE ONLY: CPT

## 2022-01-20 PROCEDURE — 87591 N.GONORRHOEAE DNA AMP PROB: CPT | Performed by: STUDENT IN AN ORGANIZED HEALTH CARE EDUCATION/TRAINING PROGRAM

## 2022-01-20 RX ORDER — PRENATAL VIT/IRON FUM/FOLIC AC 27MG-0.8MG
1 TABLET ORAL DAILY
Qty: 90 TABLET | Refills: 3 | Status: SHIPPED | OUTPATIENT
Start: 2022-01-20 | End: 2022-04-28

## 2022-01-20 RX ORDER — PYRIDOXINE HCL (VITAMIN B6) 25 MG
25 TABLET ORAL EVERY 6 HOURS PRN
Qty: 30 TABLET | Refills: 1 | Status: SHIPPED | OUTPATIENT
Start: 2022-01-20 | End: 2022-04-28

## 2022-01-20 SDOH — ECONOMIC STABILITY: TRANSPORTATION INSECURITY
IN THE PAST 12 MONTHS, HAS LACK OF TRANSPORTATION KEPT YOU FROM MEETINGS, WORK, OR FROM GETTING THINGS NEEDED FOR DAILY LIVING?: NO

## 2022-01-20 SDOH — ECONOMIC STABILITY: FOOD INSECURITY: WITHIN THE PAST 12 MONTHS, THE FOOD YOU BOUGHT JUST DIDN'T LAST AND YOU DIDN'T HAVE MONEY TO GET MORE.: NEVER TRUE

## 2022-01-20 SDOH — ECONOMIC STABILITY: FOOD INSECURITY: WITHIN THE PAST 12 MONTHS, YOU WORRIED THAT YOUR FOOD WOULD RUN OUT BEFORE YOU GOT MONEY TO BUY MORE.: NEVER TRUE

## 2022-01-20 SDOH — ECONOMIC STABILITY: TRANSPORTATION INSECURITY
IN THE PAST 12 MONTHS, HAS THE LACK OF TRANSPORTATION KEPT YOU FROM MEDICAL APPOINTMENTS OR FROM GETTING MEDICATIONS?: NO

## 2022-01-20 ASSESSMENT — PATIENT HEALTH QUESTIONNAIRE - PHQ9: SUM OF ALL RESPONSES TO PHQ QUESTIONS 1-9: 7

## 2022-01-20 ASSESSMENT — SOCIAL DETERMINANTS OF HEALTH (SDOH): HOW HARD IS IT FOR YOU TO PAY FOR THE VERY BASICS LIKE FOOD, HOUSING, MEDICAL CARE, AND HEATING?: SOMEWHAT HARD

## 2022-01-20 NOTE — PROGRESS NOTES
Past Medical History     Do you have a history of any of the following medical conditions?    Condition No/Yes/Details   Hypertension No    Heart disease, mitral valve prolapse, rheumatic fever No    Asthma or another chronic lung disease No    An autoimmune disorder No    Kidney disease No    Frequent urinary tract infections No    Epilepsy, seizures, or spells No    Migraine headaches  YES daily migraines   Stroke, loss of sensation/function, seizures, or other neuro problem No    Diabetes No    Thyroid problems or have you taken thyroid medication No    Hepatitis, liver disease, jaundice No    Blood clots, phlebitis, pulmonary embolism or varicose veins No    Excessive bleeding after surgery or dental work No    Do you have more bleeding than other women after a cut or a scratch? No    Anemia  YES has alpha thalassemia   Blood transfusions No         Would you refuse a blood transfusion?       No   If yes, then ask next question. If no, skip next question.   Would you rather die than receive a blood transfusion? No    Breast problems No    Have you ever ? No plans to breast feed   Abnormalities of the uterus No    Abnormal pap smear No    Have you ever been treated for depression?  YES ok right now   Are you having problems with crying spells or loss of self-esteem? No    Have you ever required psychiatric care?  YES in the past   Have you been physically, sexually, or emotionally hurt by someone? No    Have you been in a major accident or suffered serious trauma? No    Have you ever had any gynecological surgical procedures such as cervical conization, LEEP, laser treatment, cryosurgery of the cervix, or a dilatation and curettage? No    Have you had any other surgical procedures? No         Have you ever had any complications from anesthesia? No    Have you ever been hospitalized for a nonsurgical reason?  YES due to  labor            Substance use and exposure     Does anyone in your  home smoke? No    Do you use tobacco products or betel nut? No    Do you drink beer, wine, or hard liquor? No    Do you use any of the following: marijuana, speed, cocaine, heroin, hallucinogens, or other drugs?  YES smokes THC once daily              Symptoms since Last Menstrual Period     Do you currently have any of the following symptoms: No/Yes/Details        Abdominal pain  YES mild lower abd cramping        Blood in the stools or urine No         Chest pain No         Shortness of breath No         coughing or vomiting up blood No         Your heart racing or skipping beats No         Nausea or vomiting  YES getting better        Pain on urination No         Vaginal discharge or bleeding  YES vaginal discharge- white clumpy 2 days ago               Genetic Screening          Is the patient 35 years or older? No      Do you have a history of any of the following No/Yes/Details        A metabolic disorder (e.g. Insulin-dependent DM, PKU) No         Recurrent pregnancy loss or still birth No      Do you, the baby's father, or anyone in your families have          Thalassemia AND MCV <80 No         Hemophilia No         Neural tube defect No }        Congenital heart defect No         Sickle cell disease or trait Yes, Alpha thalassemia        Muscular dystrophy No         Cystic fibrosis No         Mental retardation or autism No         Down's syndrome No         Stan[place]-Sach's disease No         [place]'s chorea No         Any other inherited genetic or chromosomal disorder No         A child with birth defects not listed above No                Infection History     Ever treated for tuberculosis or had a positive skin test No    Ever had genital herpes (or has your partner) No    Had a rash or viral illness since LMP  YES COVID 21   Ever had a sexually transmitted infection  YES gonorrhea 20   Ever had chicken pox or the vaccine  YES as a child   Have you had a sexual partner who is HIV  positive No    Ever had any other serious infectious disease No                Risk Assessment     Average Risk Category  No significant risk factors: Yes    At Risk Category (up to 3)  Teen pregnancy: No  Poor social situation: No  Domestic abuse: No  Financial difficulties: No  Smoker: No  H/O  deliver: Yes  H/O drug abuse: Yes  Non-English speaking: No  Advanced maternal age: No  GDM risks: No  Previous C/S: No  H/O PIH: No  H/O STIs: Yes  H/O mental health concerns: Yes  Onset care > 20 weeks: No      High Risk Category (4 or more At Risk or)  Diabetes/GDM: No  Multiple gestation: No  Chronic hypertension: No  Significant hx of asthma: No  Fetal demise > 20 weeks: No  Positive tox screen: No  Current mental health treatment: No      Risk: High Risk   Date determined: 22

## 2022-01-20 NOTE — LETTER
RETURN TO WORK/SCHOOL FORM    1/20/2022    Re: Antonette Horton  1990      To Whom It May Concern:     Antonette Horton was seen in clinic today.  She may return to work with restrictions on 1/21/22          Restrictions: lifting restriction of 25 lbs. 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       Asuncion Garcia MD  1/20/2022 11:09 AM

## 2022-01-20 NOTE — PROGRESS NOTES
First Obstetric Visit           Assessment and Plan     Antonette was seen today for prenatal care.    Diagnoses and all orders for this visit:    Supervision of high risk pregnancy, antepartum  -     pyridOXINE (VITAMIN B6) 25 MG tablet; Take 1 tablet (25 mg) by mouth every 6 hours as needed (nausea)  -     doxylamine (UNISOM) 25 MG TABS tablet; Take 1 tablet (25 mg) by mouth nightly as needed for sleep (or nausea)  -     Prenatal Vit-Fe Fumarate-FA (PRENATAL MULTIVITAMIN W/IRON) 27-0.8 MG tablet; Take 1 tablet by mouth daily  -     ABO/Rh Type-HML; Future  -     Antibody Screen; Future  -     Cancel: Chlamydia/Gono Amplified; Future  -     CBC with Plt; Future  -     Culture Urine; Future  -     Hepatitis B Surface Ag; Future  -     HIV Ag/Ab Screen Cascade; Future  -     Lead, Blood; Future  -     Rubella Antibody IgG; Future  -     Syphilis Screen Cascade; Future  -     Antibody Screen  -     ABO/Rh Type-HML  -     Syphilis Screen Cascade  -     Rubella Antibody IgG  -     Lead, Blood  -     HIV Ag/Ab Screen Cascade  -     Hepatitis B Surface Ag  -     CBC with Plt  -     Culture Urine  -     Chlamydia trachomatis PCR; Future  -     Neisseria gonorrhoeae PCR; Future  -     Neisseria gonorrhoeae PCR  -     Chlamydia trachomatis PCR    Vaginal discharge  -     Wet preparation      31 year old  , 11w6d weeks of pregnancy with BUCK of Aug 5, 2022 by LMP of Patient's last menstrual period was 10/29/2021 (exact date)..      Pregnancy Risk Assessment: High Risk pregnancy  Discussed high risk conditions as follows: Daily THC use, but has cut back. History of depression, well controlled currently without medication. History of STI.     -Dating US obtained and dating confirmed?   Yes   -Taking PNV/Folate?     Yes       - Ordered new OB labs: blood type and antibody screen, HIV, VDRL, hep B, rubella, UA/UC, gonorrhea/chlamydia, Wet prep done today. Wet prep negative for signs of vaginitis.    - Discussed genetic  screening. Patient does not want to pursue screening.   - Discussed screening for sickle cell anemia. Patient does not  want to pursue Hb electrophoresis.     - Discussed early screening for gestational diabetes. The patient does not have a history of GDM, BMI>30, h/o prediabetes/glucose intolerance, first degree relative with GDM or DM, or chronic HTN, so WILL NOT obtain early GCT or A1c.    - The patient does not h/o severe, early preeclampsia with delivery <34weeks, preeclampsia in more than one pregnancy, pre-gestational diabetes, chronic hypertension, renal disease, or autoimmune disease so WILL NOT start low dose aspirin (81mg) and calcium supplementation (1g-1.5g/day elemental = 2500mg- 3750mg/day Calcium carbonate) to prevent preeclampsia.    - The patient  does not have a history of spontaneous  birth so  WILL NOT consider progesterone starting at 16-20 weeks and/or serial transvaginal cervical length ultrasounds from 16-24 weeks.    Did have  labor with her most recent daughter, hospitalized, steroids for lung development, delivery at 37 weeks    - Prenatal vitamins ordered.  - Covid shot offered and was declined, but she is considering it.   - Flu shot offered and was Declined.    Counseling given:   - Follow up in 4 weeks for return OB visit.  - Recommended weight gain for pregnancy: 25-35 lbs (pregravid BMI 18.5-24.9)      - Instructed on best evidence for: healthy diet and foods to avoid; exercise and activity during pregnancy; avoiding exposure to toxoplasmosis; safe use of seatbelts during pregnancy; and maintenance of a generally healthy lifestyle  -Patient to see OB educator/ RN today and/or next visit.    Discussed the harms, benefits, side effects and alternative therapies for current prescribed and OTC medications.     Options for treatment and follow-up care were reviewed with the patient and/or guardian. Antonette Horton and/or guardian engaged in the decision making process and  verbalized understanding of the options discussed and agreed with the final plan.    I precepted with Dr. Rossi.    Asuncion Garcia MD, PGY-2         HPI       Antonette Horton is a 31 year old woman who presents for an initial prenatal visit at 11w6d weeks of pregnancy with BUCK of Aug 5, 2022 by LMP of Patient's last menstrual period was 10/29/2021 (exact date)..      She has not had bleeding since her LMP.   She has had mild nausea. Has improved. Had previously used zofran.   Weight loss has not occurred.    This was not a planned pregnancy.    OTHER CONCERNS:     Has had some soreness or tenderness with sex.     Concerned about work. 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             Labor Risk Assessment     Is the patient's age <18 or >40?     No  Patint's BMI is Body mass index is 22.63 kg/m .   Does patient have a BMI < 18.5?     No  Prior delivery within 6 months?      No  Ever delivered prior to 37 weeks gestation?  No  Pregnancy occur via In Vitro Fertilization?   No  Are you carrying twins?       No    The patient has the following additional risk factors for  labor:   Q13,15,17: History of Substance Abuse  Q14,16; Substance use this pregnancy  Q19: History of Depression, bi-polar, anxiety, schizophrenia      Labor Risk Summary:  Pt is high risk for  Labor  No             Past Medical History     Past Medical History:   Diagnosis Date     Known health problems: none      See nurse history note, reviewed in detail.             Current Medications      Current Outpatient Medications   Medication Sig Dispense Refill     doxylamine (UNISOM) 25 MG TABS tablet Take 1 tablet (25 mg) by mouth nightly as needed for sleep (or nausea) 30 tablet 1     Prenatal Vit-Fe Fumarate-FA (PRENATAL MULTIVITAMIN W/IRON) 27-0.8 MG tablet Take 1 tablet by mouth daily 90 tablet 3     pyridOXINE (VITAMIN B6) 25 MG tablet Take 1 tablet (25 mg) by mouth every 6 hours as  needed (nausea) 30 tablet 1             Review of Systems      ROS:  YES, come and go - Headache  No - Changes in vision  No - Chest Pain  No - Shortness of Breath  YES - Nausea   No - Vomiting  YES, cramping when she has to pee - Abdominal pain   No - Contractions  No, but having some frequency and some cramping when she has to pee. Amount has decreased - Dysuria   Yes, occasionally clumpy white discharge- Vaginal Discharge    No - Vaginal bleeding   No - Loss of Fluid   No - Extremity swelling     ====================================================           Physical Exam      /72 (BP Location: Left arm, Patient Position: Sitting, Cuff Size: Adult Regular)   Pulse 74   Temp 98.3  F (36.8  C) (Oral)   Resp 16   Wt 61.7 kg (136 lb)   LMP 10/29/2021 (Exact Date)   SpO2 100%   BMI 22.63 kg/m    GENERAL: healthy, alert and no distress  NECK: no tenderness, no adenopathy, no asymmetry, no masses, no stiffness; thyroid- normal to palpation  RESP: lungs clear to auscultation - no rales, no rhonchi, no wheezes  CV: regular rates and rhythm, normal S1 S2, no S3 or S4 and no murmur, no click or rub -  ABDOMEN: soft, no tenderness. FHT briefly audible in normal range  MS: extremities- no gross deformities noted, no edema  Pelvic exam declined by patient    Past labs reviewed:  A POS  Varicella immune Yes  Hepatitis B immune Yes  Last pap 11/21.  She is not due for this today.    =========================================

## 2022-01-21 LAB
C TRACH DNA SPEC QL NAA+PROBE: NEGATIVE
HBV SURFACE AG SERPL QL IA: NONREACTIVE
N GONORRHOEA DNA SPEC QL NAA+PROBE: NEGATIVE
RUBV IGG SERPL QL IA: 2.07 INDEX
RUBV IGG SERPL QL IA: POSITIVE

## 2022-01-22 LAB — BACTERIA UR CULT: NORMAL

## 2022-01-23 LAB — LEAD BLDV-MCNC: <2 UG/DL

## 2022-01-24 ENCOUNTER — TELEPHONE (OUTPATIENT)
Dept: FAMILY MEDICINE | Facility: CLINIC | Age: 32
End: 2022-01-24
Payer: COMMERCIAL

## 2022-01-24 NOTE — TELEPHONE ENCOUNTER
Pt is calling for urine culture results from 1/20/22.  Discussed that it shows 50,000-100,000 CFU/mL Mixture of urogenital kameron.  Pt states that she has continued to have cramping pain when she needs to void that still hurts after voiding.  She states that the pain is getting worse and rates the pain at 7-8 at it's worst.  She denies blood in her urine and states that her urine is not dark in color.  She has been drinking more water.  Pt is wondering if there is anything that Dr Garcia would recommend or prescribe for this?  Routed note to Dr Garcia./AR

## 2022-01-24 NOTE — TELEPHONE ENCOUNTER
[facility] phone call message- patient requesting results:    Test: Lab    Date of test: 1/20/2022    Additional Comments:       OK to leave a message on voice mail? Yes    Primary language: English      needed? No    Call taken on January 24, 2022 at 9:34 AM by Prasanth Ashraf

## 2022-01-24 NOTE — TELEPHONE ENCOUNTER
Called patient. She describes the pain as cramping when she needs to urinatea and after she empties her bladder, but not during urination. She does not think she is drinking enough water. Discussed possibility of trying pyridium, but she prefers to attempt to increase her water intake instead. Encouraged her to make a follow up appointment.   Asuncion Garcia MD, PGY-2  [facility]  January 24, 2022

## 2022-01-24 NOTE — PROGRESS NOTES
Preceptor Attestation:    I discussed the patient with the resident and evaluated the patient in person. I have verified the content of the note, which accurately reflects my assessment of the patient and the plan of care.   Supervising Physician:  Abe Rossi MD.

## 2022-01-25 PROBLEM — Z86.19 H/O GONORRHEA: Status: ACTIVE | Noted: 2021-06-28

## 2022-01-25 PROBLEM — Z86.59 HISTORY OF DEPRESSION: Status: ACTIVE | Noted: 2022-01-20

## 2022-01-25 PROBLEM — O09.90 SUPERVISION OF HIGH RISK PREGNANCY, ANTEPARTUM: Status: ACTIVE | Noted: 2022-01-20

## 2022-01-25 PROBLEM — F12.10 TETRAHYDROCANNABINOL (THC) USE DISORDER, MILD, ABUSE: Status: ACTIVE | Noted: 2022-01-20

## 2022-01-25 PROBLEM — O09.219 PREGNANCY COMPLICATED BY PREVIOUS PRETERM LABOR: Status: ACTIVE | Noted: 2022-01-20

## 2022-01-25 NOTE — NURSING NOTE
Family Medicine OB Education    I provided the following OB education to Antonette TYSON Clifford.    Discussed that Dr Garcia should be the doctor that she sees for her prenatal visits and that provider will try hard to be the one to deliver her baby.  Discussed that if primary provider was unavailable that one of our other physicians would deliver the baby and that this could be a male or female provider.  Briefly discussed residency program and that multiple doctors would be present at time of delivery.  Sheet given and discussed fetal growth and development.  Sheet given and discussed warning signs with reasons to call clinic or L&D with questions or concerns (phone numbers given).  Sheet given and discussed Tdap to be given after 27 weeks gestation and the importance of family members get immunized before delivery.  Proof of pregnancy completed and given to pt.  Gave pt preregistration form for hospital to complete.  See questionnaire and pregnancy risk assessment for further information in provider encounter.     Legal Screener completed and there were no concerns for government benefits, housing, or immigration.      Name of provider who requested the OB education: Dr Garcia  Name of provider on site (faculty or community preceptor) at the time of performing the OB education: Dr Flo Keller, RN, BSN

## 2022-01-27 ENCOUNTER — VIRTUAL VISIT (OUTPATIENT)
Dept: FAMILY MEDICINE | Facility: CLINIC | Age: 32
End: 2022-01-27
Payer: COMMERCIAL

## 2022-01-27 DIAGNOSIS — R10.9 ABDOMINAL CRAMPING: ICD-10-CM

## 2022-01-27 DIAGNOSIS — O09.90 SUPERVISION OF HIGH RISK PREGNANCY, ANTEPARTUM: Primary | ICD-10-CM

## 2022-01-27 PROCEDURE — 99212 OFFICE O/P EST SF 10 MIN: CPT | Mod: TEL | Performed by: STUDENT IN AN ORGANIZED HEALTH CARE EDUCATION/TRAINING PROGRAM

## 2022-01-27 NOTE — PROGRESS NOTES
"Antonette is a 31 year old who is being evaluated via a billable telephone visit.      What phone number would you like to be contacted at? [phone]  How would you like to obtain your AVS? Ramy    Assessment & Plan     Supervision of high risk pregnancy, antepartum  Abdominal cramping  Antonette is a 31-year-old  woman at 12w6d gestation by LMP following up for abdominal cramping with urination, which has no resolved. Likely was dysuria secondary to concentrated urine. Also reviewed ultrasound results. Discussed that dating will be based on LMP as there is only a 6 day discrepancy from US. Recommended follow up in 3 weeks as scheduled.     I precepted with Dr. Guerin.    Asuncion Garcia MD, PGY-2  [facility]    Subjective   Antonette is a 31 year old who presents for the following health issues     HPI     Chief Complaint   Patient presents with     Dysuria     Pt is following up on pain with urination.  She states she was told to drink more water and has increased her water intake a bit and is feeling a little bit better.       Drinking more water and cramping has improved. \"Not really\" feeling anymore cramping.     Wondering if her due date will be based on her LMP or her US results.         Objective         Vitals:  No vitals were obtained today due to virtual visit.    Physical Exam   healthy, alert and no distress  PSYCH: Alert and oriented times 3; coherent speech, normal   rate and volume, able to articulate logical thoughts, able   to abstract reason, no tangential thoughts, no hallucinations   or delusions  Her affect is normal  RESP: No cough, no audible wheezing, able to talk in full sentences  Remainder of exam unable to be completed due to telephone visits     EXAM: US OB < 14 WEEKS SINGLE-TRANSABDOMINAL  LOCATION: [facility]  DATE/TIME: 2022 4:14 PM     INDICATION: LMP 10/29/21; EDC 22, uncertain dates, prior spontaneous  x " 2.  COMPARISON: None.  TECHNIQUE: Transabdominal scans were performed.     FINDINGS:  UTERUS: Single normal appearing intrauterine gestation sac.  CRL: Measures 5.7 cm, equals 12 weeks and 2 days.  FETAL HEART RATE: 159 bpm.  AMNIOTIC FLUID: Normal.  PLACENTA: Not yet formed. No evidence for sub-chorionic hemorrhage.     RIGHT OVARY: Not visualized from positioning or overlying bowel gas.  LEFT OVARY: Not visualized from positioning or overlying bowel gas.                                                                      IMPRESSION:   1.  Single living intrauterine gestation at 12 weeks and 2 days, EDC 07/30/2022.    Phone call duration: 5 minutes

## 2022-03-03 ENCOUNTER — OFFICE VISIT (OUTPATIENT)
Dept: FAMILY MEDICINE | Facility: CLINIC | Age: 32
End: 2022-03-03
Payer: COMMERCIAL

## 2022-03-03 VITALS
BODY MASS INDEX: 23.48 KG/M2 | HEIGHT: 67 IN | SYSTOLIC BLOOD PRESSURE: 113 MMHG | OXYGEN SATURATION: 100 % | DIASTOLIC BLOOD PRESSURE: 75 MMHG | TEMPERATURE: 98.6 F | WEIGHT: 149.6 LBS | HEART RATE: 80 BPM

## 2022-03-03 DIAGNOSIS — Z87.51 HISTORY OF PRETERM LABOR: Primary | ICD-10-CM

## 2022-03-03 DIAGNOSIS — O09.90 SUPERVISION OF HIGH RISK PREGNANCY, ANTEPARTUM: ICD-10-CM

## 2022-03-03 PROCEDURE — 99207 PR PRENATAL VISIT: CPT | Mod: GC | Performed by: STUDENT IN AN ORGANIZED HEALTH CARE EDUCATION/TRAINING PROGRAM
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
## 2022-03-04 ENCOUNTER — TRANSCRIBE ORDERS (OUTPATIENT)
Dept: MATERNAL FETAL MEDICINE | Facility: HOSPITAL | Age: 32
End: 2022-03-04
Payer: COMMERCIAL

## 2022-03-04 DIAGNOSIS — O26.90 PREGNANCY RELATED CONDITION, ANTEPARTUM: Primary | ICD-10-CM

## 2022-03-06 ENCOUNTER — DOCUMENTATION ONLY (OUTPATIENT)
Dept: FAMILY MEDICINE | Facility: CLINIC | Age: 32
End: 2022-03-06
Payer: COMMERCIAL

## 2022-03-06 NOTE — PROGRESS NOTES
Did call the patient after receiving a message who stated that she is pregnant and started having itching on her vagina for 1 day, itching is associated with mild white discharge, without smell.  Reviewing patient's record, there was similar complaint with normal lab work at the clinic and patient found to be dehydrated.  Patient was recommended to increase fluid intake and call if the clinic on Monday for appointment.   Cm Thomas, PGY2

## 2022-03-15 ENCOUNTER — HOSPITAL ENCOUNTER (EMERGENCY)
Facility: CLINIC | Age: 32
End: 2022-03-15
Payer: COMMERCIAL

## 2022-03-15 ENCOUNTER — HOSPITAL ENCOUNTER (OUTPATIENT)
Facility: CLINIC | Age: 32
Discharge: HOME | End: 2022-03-15
Attending: FAMILY MEDICINE | Admitting: STUDENT IN AN ORGANIZED HEALTH CARE EDUCATION/TRAINING PROGRAM
Payer: COMMERCIAL

## 2022-03-15 VITALS
RESPIRATION RATE: 16 BRPM | TEMPERATURE: 98.5 F | HEART RATE: 95 BPM | SYSTOLIC BLOOD PRESSURE: 129 MMHG | HEIGHT: 65 IN | WEIGHT: 149 LBS | DIASTOLIC BLOOD PRESSURE: 72 MMHG | BODY MASS INDEX: 24.83 KG/M2

## 2022-03-15 LAB
ALBUMIN UR-MCNC: 10 MG/DL
APPEARANCE UR: CLEAR
BILIRUB UR QL STRIP: NEGATIVE
CLUE CELLS: ABNORMAL
COLOR UR AUTO: YELLOW
GLUCOSE UR STRIP-MCNC: NEGATIVE MG/DL
HGB UR QL STRIP: NEGATIVE
KETONES UR STRIP-MCNC: NEGATIVE MG/DL
LEUKOCYTE ESTERASE UR QL STRIP: NEGATIVE
MUCOUS THREADS #/AREA URNS LPF: PRESENT /LPF
NITRATE UR QL: NEGATIVE
PH UR STRIP: 7 [PH] (ref 5–7)
RBC URINE: <1 /HPF
SP GR UR STRIP: 1.02 (ref 1–1.03)
SQUAMOUS EPITHELIAL: <1 /HPF
TRICHOMONAS, WET PREP: ABNORMAL
UROBILINOGEN UR STRIP-MCNC: <2 MG/DL
WBC URINE: 1 /HPF
WBC'S/HIGH POWER FIELD, WET PREP: ABNORMAL
YEAST, WET PREP: ABNORMAL

## 2022-03-15 PROCEDURE — G0463 HOSPITAL OUTPT CLINIC VISIT: HCPCS

## 2022-03-15 PROCEDURE — 81001 URINALYSIS AUTO W/SCOPE: CPT | Performed by: STUDENT IN AN ORGANIZED HEALTH CARE EDUCATION/TRAINING PROGRAM

## 2022-03-15 PROCEDURE — 87491 CHLMYD TRACH DNA AMP PROBE: CPT | Performed by: STUDENT IN AN ORGANIZED HEALTH CARE EDUCATION/TRAINING PROGRAM

## 2022-03-15 PROCEDURE — 87210 SMEAR WET MOUNT SALINE/INK: CPT | Performed by: STUDENT IN AN ORGANIZED HEALTH CARE EDUCATION/TRAINING PROGRAM

## 2022-03-15 RX ORDER — LIDOCAINE 40 MG/G
CREAM TOPICAL
Status: DISCONTINUED | OUTPATIENT
Start: 2022-03-15 | End: 2022-03-15 | Stop reason: HOSPADM

## 2022-03-15 NOTE — PROGRESS NOTES
"Pt presents to OB triage for abdominal pain for at least two days and nausea/vomiting.  Pt reports that the abdominal pain relieves to a \"2\" on a 0-10 scale when sitting, and worsens to an \"8\" when standing or changing positions.  The pain is located in the RLQ and LLQ and feels \"very tight\"  Pt denies vaginal bleeding or leaking of fluid.  FHT's 150-155 on EFM as doppler.  Nausea and vomiting has been experienced by the patient each morning in the last few days, with one episode of emesis per day.  Pt otherwise tolerating food and fluids, but reports that's she hasn't been eating much due to stress.  Pt denies frequency or urgency with urination.     Pt also reports that she has been stressed in her relationship with the FOB.  She reports that FOB has been cheating, she kicked him out of the apartment, needed to file a restraining order (yesterday), and is now looking for a new place to live with her .  Pt admitted to occasional THC use, and declines a UA drug screen.  Jania Adrian RN       "

## 2022-03-15 NOTE — DISCHARGE INSTRUCTIONS
Discharge Instruction for Undelivered Patients      You were seen for: Labor Assessment  We Consulted: BFM  You had (Test or Medicine): UA, wet prep, uterine monitoring, urine GC    Diet:   You may eat meals and snacks.     Activity:  Call your doctor or nurse midwife if your baby is moving less than usual.     Call your provider if you notice:  Swelling in your face or increased swelling in your hands or legs.  Headaches that are not relieved by Tylenol (acetaminophen).  Changes in your vision (blurring: seeing spots or stars.)  Nausea (sick to your stomach) and vomiting (throwing up).   Weight gain of 5 pounds or more per week.  Heartburn that doesn't go away.  Signs of bladder infection: pain when you urinate (use the toilet), need to go more often and more urgently.  The bag of li (rupture of membranes) breaks, or you notice leaking in your underwear.  Bright red blood in your underwear.  Abdominal (lower belly) or stomach pain.  For first baby: Contractions (tightening) less than 5 minutes apart for one hour or more.  Second (plus) baby: Contractions (tightening) less than 10 minutes apart and getting stronger.  *If less than 34 weeks: Contractions (tightening) more than 6 times in one hour.  Increase or change in vaginal discharge (note the color and amount)      Follow-up:  As scheduled in the clinic
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
## 2022-03-15 NOTE — PROGRESS NOTES
UA not suggestive of infection, wet prep without clue cells or yeast.  GC chlamydia pending.  NST reactive.  No contractions on toco.    I suspect that the etiology of her symptoms is round ligament pain.  Reviewed signs and symptoms of labor.  Encouraged to call to follow-up with her primary OB, Dr. Asuncion Garcia.  She plans to stay with her sister for a few days.  Declined offer to have social work send crisis/shelter resources.    She tolerated oral hydration and ate some turkey sandwich without nausea or emesis.  She liked lavender scented for help with nausea and relaxation and plans to order some on [employer].    She is stable for discharge with close follow-up with her PCP.    Rola Ramsay MD on 3/15/2022 at 12:43 PM

## 2022-03-15 NOTE — PROGRESS NOTES
Reviewed discharge instructions with patient, pt verbalized understanding, and pt discharged to home.  Jania Adrian RN

## 2022-03-15 NOTE — LETTER
March 15, 2022      Antonette Horton  [address]    [place] [address]        To Whom It May Concern:    Antonette Horton  was seen on 03/15/22 .  Please excuse her until 03/17/22 due to illness.         Sincerely,        Rola Ramsay MD

## 2022-03-15 NOTE — PROGRESS NOTES
Pt has collected urine specimens and sent to lab.  Emesis occurred once on arrival, and pt has tolerated ice chips and Ana Lilia Twist (8oz) over the last 2 hours.  Pt currently eating a sandwich and apple sauce.  No contractions have been traced on EFM, and no contractions palpated.  PT was able to stand, ambulate to toilet, then void without acute distress.  Jania Adrian RN

## 2022-03-16 LAB
C TRACH DNA SPEC QL PROBE+SIG AMP: NEGATIVE
N GONORRHOEA DNA SPEC QL NAA+PROBE: NEGATIVE

## 2022-03-21 ENCOUNTER — MYC MEDICAL ADVICE (OUTPATIENT)
Dept: FAMILY MEDICINE | Facility: CLINIC | Age: 32
End: 2022-03-21
Payer: COMMERCIAL

## 2022-03-21 ENCOUNTER — TELEPHONE (OUTPATIENT)
Dept: FAMILY MEDICINE | Facility: CLINIC | Age: 32
End: 2022-03-21
Payer: COMMERCIAL

## 2022-03-21 ENCOUNTER — PRE VISIT (OUTPATIENT)
Dept: MATERNAL FETAL MEDICINE | Facility: HOSPITAL | Age: 32
End: 2022-03-21
Payer: COMMERCIAL

## 2022-03-21 DIAGNOSIS — R09.81 NASAL CONGESTION: Primary | ICD-10-CM

## 2022-03-21 RX ORDER — CETIRIZINE HYDROCHLORIDE 10 MG/1
10 TABLET ORAL DAILY
Qty: 50 TABLET | Refills: 3 | Status: SHIPPED | OUTPATIENT
Start: 2022-03-21 | End: 2022-12-12

## 2022-03-21 RX ORDER — ACETAMINOPHEN 325 MG/1
325-650 TABLET ORAL EVERY 6 HOURS PRN
Qty: 50 TABLET | Refills: 1 | Status: SHIPPED | OUTPATIENT
Start: 2022-03-21 | End: 2023-02-28

## 2022-03-21 RX ORDER — GUAIFENESIN 600 MG/1
1200 TABLET, EXTENDED RELEASE ORAL 2 TIMES DAILY
Qty: 30 TABLET | Refills: 1 | Status: SHIPPED | OUTPATIENT
Start: 2022-03-21 | End: 2023-02-28

## 2022-03-21 NOTE — TELEPHONE ENCOUNTER
Pt is 20w3d pregnant and states that for the last 2-3 days she has had nasal congestion making it hard to breathe at times, face hurts, and little cough but denies fever.  She states that she has been sleeping with a fan on and thinks that this is likely contributing to her symptoms.  She is unsure if she may have seasonal allergies as well.  She tried Sudafed but it did not help.  Wondering if there is anything else that she can take?  Pt states that she is working right now and would like Dr Garcia to send any rxs to her pharmacy and a my chart message with anything that is helpful and okay for her to take.      Gave info to Dr Garcia and she will send following rxs: Tylenol, Zyrtec, and Muccinex.    Sent My Chart message with above info for pt and told her to schedule appt if symptoms worsening or develops fever.//NG

## 2022-03-25 ENCOUNTER — OFFICE VISIT (OUTPATIENT)
Dept: MATERNAL FETAL MEDICINE | Facility: HOSPITAL | Age: 32
End: 2022-03-25
Attending: FAMILY MEDICINE
Payer: COMMERCIAL

## 2022-03-25 ENCOUNTER — ANCILLARY PROCEDURE (OUTPATIENT)
Dept: ULTRASOUND IMAGING | Facility: HOSPITAL | Age: 32
End: 2022-03-25
Attending: FAMILY MEDICINE
Payer: COMMERCIAL

## 2022-03-25 ENCOUNTER — OFFICE VISIT (OUTPATIENT)
Dept: MATERNAL FETAL MEDICINE | Facility: HOSPITAL | Age: 32
End: 2022-03-25
Attending: OBSTETRICS & GYNECOLOGY
Payer: COMMERCIAL

## 2022-03-25 DIAGNOSIS — O26.90 PREGNANCY RELATED CONDITION, ANTEPARTUM: ICD-10-CM

## 2022-03-25 DIAGNOSIS — O98.512 COVID-19 AFFECTING PREGNANCY IN SECOND TRIMESTER: Primary | ICD-10-CM

## 2022-03-25 DIAGNOSIS — Z31.5 ENCOUNTER FOR PROCREATIVE GENETIC COUNSELING AND TESTING: ICD-10-CM

## 2022-03-25 DIAGNOSIS — U07.1 COVID-19 AFFECTING PREGNANCY IN SECOND TRIMESTER: Primary | ICD-10-CM

## 2022-03-25 DIAGNOSIS — O28.3 ABNORMAL PRENATAL ULTRASOUND: ICD-10-CM

## 2022-03-25 DIAGNOSIS — O28.3 ABNORMAL PRENATAL ULTRASOUND: Primary | ICD-10-CM

## 2022-03-25 DIAGNOSIS — O35.BXX0 ECHOGENIC FOCUS OF HEART OF FETUS AFFECTING ANTEPARTUM CARE OF MOTHER, SINGLE OR UNSPECIFIED FETUS: ICD-10-CM

## 2022-03-25 PROCEDURE — 76811 OB US DETAILED SNGL FETUS: CPT

## 2022-03-25 PROCEDURE — 99207 PR NO CHARGE LOS: CPT | Performed by: OBSTETRICS & GYNECOLOGY

## 2022-03-25 PROCEDURE — 999N000069 HC STATISTIC GENETIC COUNSELING, < 16 MIN: Performed by: GENETIC COUNSELOR, MS

## 2022-03-25 PROCEDURE — 76811 OB US DETAILED SNGL FETUS: CPT | Mod: 26 | Performed by: OBSTETRICS & GYNECOLOGY

## 2022-03-25 NOTE — PROGRESS NOTES
"Please see \"Imaging\" tab under \"Chart Review\" for details of today's visit.    Trip Tillman    "

## 2022-03-25 NOTE — PROGRESS NOTES
[facility]  Genetic Counseling Consult    Patient:  Antonette Horton YOB: 1990   Date of Service:  3/25/22      Antonette Horton was seen at the [facility] for genetic consultation as part of her appointment for comprehensive ultrasound.  The indication for genetic counseling is marker for aneuploidy on today's ultrasound.       Impression/Plan:   1. Antonette has not had serum screening in this pregnancy.     2. Antonette had a comprehensive (level II) ultrasound today which noted an echogenic intracardiac focus, slightly increasing the probability for the pregnancy to have Down syndrome.  Please see the ultrasound report for further details.    3. The patient declines genetic amniocentesis and maternal serum screening today.    Pregnancy History:   /Parity:    Age at Delivery: 32 year old  BUCK: 2022, by Last Menstrual Period  Gestational Age: 21w0d    No significant complications or exposures were reported in the current pregnancy.    Antonette s pregnancy history is significant for 2 prior full term pregnancies with no reported obstetric complications.    Medical History:   Antonette jordan reported medical history is not expected to impact pregnancy management or risks to fetal development.       Risk Assessment for Chromosome Conditions:   We explained that the risk for fetal chromosome abnormalities increases with maternal age. We discussed specific features of common chromosome abnormalities, including Down syndrome, trisomy 13, trisomy 18, and sex chromosome trisomies.      - At age 31 at midtrimester, the risk to have a baby with Down syndrome is 1 in 508.     - At age 31 at midtrimester, the risk to have a baby with any chromosome abnormality is 1 in 254.       Antonette did not have maternal serum screening earlier in pregnancy.    During today's encounter we reviewed the finding from today's ultrasound, echogenic intracardiac  focus (EIF).  EIF is considered a common finding in during pregnancy, occurring in as many as 1% of pregnancies.  It is not a birth defect and is not expected to cause any complications or require any specific management on its own.  We discussed that when it is identified, it may increase the risk for a pregnancy to be affected with Down syndrome.  The relative risk increase for this finding is as high as 1.8.  Antonette's age related risk for her pregnancy to be affected with Down syndrome is 1/508.  The adjusted risk for Antonette's pregnancy to be affected with Down syndrome is 1/282, or ~0.4%. This corresponds to a 99.6% chance that Antonette's pregnancy does not have Down syndrome.  We discussed that this finding does represent a change in risk, and that additional testing for clarification would be appropriate if desired. After briefly reviewing options for NIPT and amniocentesis, Antonette opted to forgo and testing for now, and understands she can choose for testing later if desired.  She reports she would not ever choose to have diagnostic testing via amniocentesis, but might want NIPT at a later time.            Testing Options:   We discussed the following options:   Non-invasive Prenatal Testing (NIPT)    Maternal plasma cell-free DNA testing; first trimester ultrasound with nuchal translucency and nasal bone assessment is recommended, when appropriate    Screens for fetal trisomy 21, trisomy 13, trisomy 18, and sex chromosome aneuploidy    Cannot screen for open neural tube defects; maternal serum AFP after 15 weeks is recommended       Genetic Amniocentesis    Invasive procedure typically performed in the second trimester by which amniotic fluid is obtained for the purpose of chromosome analysis and/or other prenatal genetic analysis    Diagnostic results; >99% sensitivity for fetal chromosome abnormalities    AFAFP measurement tests for open neural tube defects       Comprehensive (Level II) ultrasound: Detailed  ultrasound performed between 18-22 weeks gestation to screen for major birth defects and markers for aneuploidy.        We reviewed the benefits and limitations of this testing.  Screening tests provide a risk assessment specific to the pregnancy for certain fetal chromosome abnormalities, but cannot definitively diagnose or exclude a fetal chromosome abnormality.  Follow-up genetic counseling and consideration of diagnostic testing is recommended with any abnormal screening result.     Diagnostic tests carry inherent risks- including risk of miscarriage- that require careful consideration.  These tests can detect fetal chromosome abnormalities with greater than 99% certainty.  Results can be compromised by maternal cell contamination or mosaicism, and are limited by the resolution of cytogenetic G-banding technology.  There is no screening nor diagnostic test that can detect all forms of birth defects or mental disability.    It was a pleasure to be involved with Antonette s care. Face-to-face time of the meeting was 15 minutes.    Florentino Meadows MS, [facility]  Licensed Genetic Counselor  Phone: [phone]  Pager: [phone]

## 2022-03-31 ENCOUNTER — OFFICE VISIT (OUTPATIENT)
Dept: FAMILY MEDICINE | Facility: CLINIC | Age: 32
End: 2022-03-31
Payer: COMMERCIAL

## 2022-03-31 VITALS
OXYGEN SATURATION: 97 % | RESPIRATION RATE: 16 BRPM | TEMPERATURE: 98.5 F | BODY MASS INDEX: 24.43 KG/M2 | SYSTOLIC BLOOD PRESSURE: 118 MMHG | DIASTOLIC BLOOD PRESSURE: 76 MMHG | HEART RATE: 107 BPM | WEIGHT: 146.8 LBS

## 2022-03-31 DIAGNOSIS — O09.90 SUPERVISION OF HIGH RISK PREGNANCY, ANTEPARTUM: ICD-10-CM

## 2022-03-31 DIAGNOSIS — Z87.51 HISTORY OF PRETERM LABOR: ICD-10-CM

## 2022-03-31 DIAGNOSIS — N89.8 VAGINAL DISCHARGE: Primary | ICD-10-CM

## 2022-03-31 LAB
CLUE CELLS: ABNORMAL
TRICHOMONAS, WET PREP: ABNORMAL
WBC'S/HIGH POWER FIELD, WET PREP: ABNORMAL
YEAST, WET PREP: ABNORMAL

## 2022-03-31 PROCEDURE — 87210 SMEAR WET MOUNT SALINE/INK: CPT | Performed by: STUDENT IN AN ORGANIZED HEALTH CARE EDUCATION/TRAINING PROGRAM

## 2022-03-31 PROCEDURE — 99207 PR PRENATAL VISIT: CPT | Mod: GC | Performed by: STUDENT IN AN ORGANIZED HEALTH CARE EDUCATION/TRAINING PROGRAM
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
## 2022-03-31 NOTE — LETTER
RETURN TO WORK/SCHOOL FORM    3/31/2022    Re: Antonette Horton  1990      To Whom It May Concern:     Atnonette Horton was seen in clinic today.  She may return to work without restrictions on 3/31/22. Please excuse her tardiness.                Asuncion Garcia MD  3/31/2022 9:49 AM

## 2022-04-13 ENCOUNTER — TELEPHONE (OUTPATIENT)
Dept: FAMILY MEDICINE | Facility: CLINIC | Age: 32
End: 2022-04-13
Payer: COMMERCIAL

## 2022-04-13 NOTE — TELEPHONE ENCOUNTER
"Called pt and she states that it was \"like a period out of her nose\" and passed some \"large\" blood clots.  She states that she has never had a nose bleed before.  Discussed NOT blowing her nose for the next couple of hours and could apply small amount of Vaseline at/around the nares before bed.  Discussed that if she has another nose bleed that she needs to pinch the bridge of her nose for 15-20 minutes.  She should call the clinic if she has another nose bleed or if it having problems with getting it to stop.  Discussed if she notices any easy bruising, gums bleeding, or any other abnl bleeding to call the clinic.  She has a PATRICIA APPT: 4/28/22 at 4:10 PM with Dr Garcia./AR  "

## 2022-04-13 NOTE — TELEPHONE ENCOUNTER
"Patient states prior to call she had about 15-20 min nose bleed that was \"bad.\" She did see blood clots. Stopped on it's own. She otherwise feels fine - does not feel lightheaded, dizzy. No vaginal bleeding, blood in stool, blood in gums. This has not happened before. She is not on blood thinners.     Discussed that nose bleeds can occur more with pregnancy. Let patient know I would forward this information to Dr. Garcia and OB coordinator for input. ./LR  "

## 2022-04-14 ENCOUNTER — TELEPHONE (OUTPATIENT)
Dept: FAMILY MEDICINE | Facility: CLINIC | Age: 32
End: 2022-04-14
Payer: COMMERCIAL

## 2022-04-14 DIAGNOSIS — B96.89 BACTERIAL VAGINOSIS IN PREGNANCY: Primary | ICD-10-CM

## 2022-04-14 DIAGNOSIS — O23.599 BACTERIAL VAGINOSIS IN PREGNANCY: Primary | ICD-10-CM

## 2022-04-14 RX ORDER — METRONIDAZOLE 7.5 MG/G
1 GEL VAGINAL DAILY
Qty: 25 G | Refills: 0 | Status: SHIPPED | OUTPATIENT
Start: 2022-04-14 | End: 2022-07-11

## 2022-04-14 NOTE — TELEPHONE ENCOUNTER
Pt went to  Urgent Care due to vaginal infection symptoms and was told that she has BV.  She states that she received a VM that the doctor sent a rx for Metronidazole pills to her pharmacy but pt states that she cannot take pills.  Pt is wondering if the doctor could prescribe Metro gel instead?/NG

## 2022-04-14 NOTE — TELEPHONE ENCOUNTER
[facility] phone call message- general phone call:    Reason for call: the Pt called to talk to cheng     Return call needed: Yes    OK to leave a message on voice mail? Yes    Primary language: English      needed? No    Call taken on April 14, 2022 at 8:46 AM by Prasanth Ashraf

## 2022-04-28 ENCOUNTER — OFFICE VISIT (OUTPATIENT)
Dept: FAMILY MEDICINE | Facility: CLINIC | Age: 32
End: 2022-04-28
Payer: COMMERCIAL

## 2022-04-28 VITALS
HEART RATE: 89 BPM | TEMPERATURE: 98.3 F | DIASTOLIC BLOOD PRESSURE: 71 MMHG | BODY MASS INDEX: 24.66 KG/M2 | OXYGEN SATURATION: 100 % | WEIGHT: 148.2 LBS | RESPIRATION RATE: 16 BRPM | SYSTOLIC BLOOD PRESSURE: 104 MMHG

## 2022-04-28 DIAGNOSIS — O09.90 SUPERVISION OF HIGH RISK PREGNANCY, ANTEPARTUM: Primary | ICD-10-CM

## 2022-04-28 DIAGNOSIS — Z87.51 HISTORY OF PRETERM LABOR: Primary | ICD-10-CM

## 2022-04-28 DIAGNOSIS — O09.90 SUPERVISION OF HIGH RISK PREGNANCY, ANTEPARTUM: ICD-10-CM

## 2022-04-28 PROCEDURE — 99207 PR PRENATAL VISIT: CPT | Mod: GC | Performed by: STUDENT IN AN ORGANIZED HEALTH CARE EDUCATION/TRAINING PROGRAM

## 2022-04-28 RX ORDER — PRENATAL VIT/IRON FUM/FOLIC AC 27MG-0.8MG
1 TABLET ORAL DAILY
Qty: 90 TABLET | Refills: 3 | Status: SHIPPED | OUTPATIENT
Start: 2022-04-28 | End: 2022-09-26

## 2022-04-28 RX ORDER — PYRIDOXINE HCL (VITAMIN B6) 25 MG
25 TABLET ORAL EVERY 6 HOURS PRN
Qty: 30 TABLET | Refills: 1 | Status: SHIPPED | OUTPATIENT
Start: 2022-04-28 | End: 2022-09-26

## 2022-04-28 NOTE — PROGRESS NOTES
Assessment & Plan  31 year old  at 25w6d with BUCK Aug 5, 2022 based on LMP. High risk pregnancy due to history of  labor, financial difficulties. Not able to do 1 hr GTT today as lab would close. Lab-only appointment made for next week and work note provided.     Antonette was seen today for prenatal care.    Diagnoses and all orders for this visit:    History of  labor    Supervision of high risk pregnancy, antepartum  -     doxylamine (UNISOM) 25 MG TABS tablet; Take 1 tablet (25 mg) by mouth nightly as needed for sleep (or nausea)  -     pyridOXINE (VITAMIN B6) 25 MG tablet; Take 1 tablet (25 mg) by mouth every 6 hours as needed (nausea)  -     Prenatal Vit-Fe Fumarate-FA (PRENATAL MULTIVITAMIN W/IRON) 27-0.8 MG tablet; Take 1 tablet by mouth daily        Weight gain adequate: 6.895 kg (15 lb 3.2 oz) to date, out of recommended total of 25-35 lbs (pregravid BMI 18.5-24.9)    There are no Patient Instructions on file for this visit.    Return to clinic in 4 weeks.    Asuncion Garcia MD  I precepted today with Rj Guerin MD.    Diagnosis or treatment significantly limited by social determinants of health - limited income, low health literacy        Subjective  Concerns: Trying to get over her break up. Having a boy. Wanting a circumcision but not sure how she will pay for it.     ROS:  Occasionally, goes away on their own - Headache  No - Changes in vision  No - Chest Pain  No - Shortness of Breath  YES, on and off - Nausea   YES, occasionally - Vomiting  No - Abdominal pain   No - Contractions  No - Dysuria   No - Vaginal Discharge    No - Vaginal bleeding   No - Loss of Fluid   YES, feet sometimes - Extremity swelling   Present - Fetal movement     Going to WIC? Yes    Risk Assessment   Average Risk Category  No significant risk factors: Yes    At Risk Category (up to 3)  Teen pregnancy: No  Poor social situation: Yes  Domestic abuse: No  Financial difficulties: Yes  Smoker: No  H/O   deliver: Yes  H/O drug abuse: Yes  Non-English speaking: No  Advanced maternal age: No  GDM risks: No  Previous C/S: No  H/O PIH: No  H/O STIs: Yes  H/O mental health concerns: Yes  Onset care > 20 weeks: No    High Risk Category (4 or more At Risk or)  Diabetes/GDM: No  Multiple gestation: No  Chronic hypertension: No  Significant hx of asthma: No  Fetal demise > 20 weeks: No  Positive tox screen: No  Current mental health treatment: No    Risk: High Risk   Date determined: 2022    Patient Active Problem List   Diagnosis     Retroflexion of uterus     Migraine without aura     H/O gonorrhea     Supervision of high risk pregnancy, antepartum     Tetrahydrocannabinol (THC) use disorder, mild, abuse     History of depression     Pregnancy complicated by previous  labor       Antonette Horton speaks English so an  was not used today.    Guidance:    circumcision  breastfeeding  GDM  signs of  labor    Objective  /71 (BP Location: Left arm, Patient Position: Sitting, Cuff Size: Adult Regular)   Pulse 89   Temp 98.3  F (36.8  C) (Oral)   Resp 16   Wt 67.2 kg (148 lb 3.2 oz)   LMP 10/29/2021 (Exact Date)   SpO2 100%   BMI 24.66 kg/m    No distress.  Gravid abdomen.  FHT 140s.  Fundal height 24 cm.  no edema.    Results  Blood type: A POS  No results found for any visits on 22.

## 2022-04-28 NOTE — Clinical Note
Antonette Francis is going to come in for a lab-only appointment next week for her 1 hr GTT.   She has concerns about paying for circumcision and wondering if payment plans are available. Do you know anything about that?  Thanks, Asuncion

## 2022-04-28 NOTE — LETTER
RETURN TO WORK/SCHOOL FORM    4/28/2022    Re: Antonette Horton  1990      To Whom It May Concern:     Antonette Horton was seen in clinic today and she also has an appointment Tuesday, May 3. Please excuse her from work when she is at her appointments.          Asuncion Garcia MD  4/28/2022 4:56 PM

## 2022-05-02 ENCOUNTER — MYC MEDICAL ADVICE (OUTPATIENT)
Dept: FAMILY MEDICINE | Facility: CLINIC | Age: 32
End: 2022-05-02
Payer: COMMERCIAL

## 2022-05-03 ENCOUNTER — OFFICE VISIT (OUTPATIENT)
Dept: FAMILY MEDICINE | Facility: CLINIC | Age: 32
End: 2022-05-03
Payer: COMMERCIAL

## 2022-05-03 VITALS
HEART RATE: 83 BPM | BODY MASS INDEX: 24.79 KG/M2 | SYSTOLIC BLOOD PRESSURE: 109 MMHG | WEIGHT: 149 LBS | TEMPERATURE: 98.4 F | DIASTOLIC BLOOD PRESSURE: 69 MMHG | RESPIRATION RATE: 18 BRPM | OXYGEN SATURATION: 100 %

## 2022-05-03 DIAGNOSIS — R30.0 DYSURIA: Primary | ICD-10-CM

## 2022-05-03 DIAGNOSIS — N89.8 VAGINAL DISCHARGE: ICD-10-CM

## 2022-05-03 DIAGNOSIS — O09.90 SUPERVISION OF HIGH RISK PREGNANCY, ANTEPARTUM: ICD-10-CM

## 2022-05-03 LAB
ALBUMIN UR-MCNC: NEGATIVE MG/DL
APPEARANCE UR: CLEAR
BILIRUB UR QL STRIP: NEGATIVE
CLUE CELLS: ABNORMAL
COLOR UR AUTO: YELLOW
ERYTHROCYTE [DISTWIDTH] IN BLOOD BY AUTOMATED COUNT: 13 % (ref 10–15)
GLUCOSE 1H P 50 G GLC PO SERPL-MCNC: 101 MG/DL (ref 70–129)
GLUCOSE UR STRIP-MCNC: NEGATIVE MG/DL
HCT VFR BLD AUTO: 33.3 % (ref 35–47)
HGB BLD-MCNC: 10.4 G/DL (ref 11.7–15.7)
HGB UR QL STRIP: NEGATIVE
KETONES UR STRIP-MCNC: NEGATIVE MG/DL
LEUKOCYTE ESTERASE UR QL STRIP: NEGATIVE
MCH RBC QN AUTO: 22.8 PG (ref 26.5–33)
MCHC RBC AUTO-ENTMCNC: 31.2 G/DL (ref 31.5–36.5)
MCV RBC AUTO: 73 FL (ref 78–100)
NITRATE UR QL: NEGATIVE
PH UR STRIP: 7 [PH] (ref 5–8)
PLATELET # BLD AUTO: 256 10E3/UL (ref 150–450)
RBC # BLD AUTO: 4.56 10E6/UL (ref 3.8–5.2)
SP GR UR STRIP: 1.02 (ref 1–1.03)
T PALLIDUM AB SER QL: NONREACTIVE
TRICHOMONAS, WET PREP: ABNORMAL
UROBILINOGEN UR STRIP-ACNC: 0.2 E.U./DL
WBC # BLD AUTO: 11.3 10E3/UL (ref 4–11)
WBC'S/HIGH POWER FIELD, WET PREP: ABNORMAL
YEAST, WET PREP: ABNORMAL

## 2022-05-03 PROCEDURE — 86780 TREPONEMA PALLIDUM: CPT | Performed by: STUDENT IN AN ORGANIZED HEALTH CARE EDUCATION/TRAINING PROGRAM

## 2022-05-03 PROCEDURE — 36415 COLL VENOUS BLD VENIPUNCTURE: CPT | Performed by: STUDENT IN AN ORGANIZED HEALTH CARE EDUCATION/TRAINING PROGRAM

## 2022-05-03 PROCEDURE — 87086 URINE CULTURE/COLONY COUNT: CPT | Performed by: STUDENT IN AN ORGANIZED HEALTH CARE EDUCATION/TRAINING PROGRAM

## 2022-05-03 PROCEDURE — 99213 OFFICE O/P EST LOW 20 MIN: CPT | Mod: 24 | Performed by: STUDENT IN AN ORGANIZED HEALTH CARE EDUCATION/TRAINING PROGRAM

## 2022-05-03 PROCEDURE — 82950 GLUCOSE TEST: CPT | Performed by: STUDENT IN AN ORGANIZED HEALTH CARE EDUCATION/TRAINING PROGRAM

## 2022-05-03 PROCEDURE — 87210 SMEAR WET MOUNT SALINE/INK: CPT | Performed by: STUDENT IN AN ORGANIZED HEALTH CARE EDUCATION/TRAINING PROGRAM

## 2022-05-03 PROCEDURE — 81003 URINALYSIS AUTO W/O SCOPE: CPT | Performed by: STUDENT IN AN ORGANIZED HEALTH CARE EDUCATION/TRAINING PROGRAM

## 2022-05-03 PROCEDURE — 85027 COMPLETE CBC AUTOMATED: CPT | Performed by: STUDENT IN AN ORGANIZED HEALTH CARE EDUCATION/TRAINING PROGRAM

## 2022-05-03 ASSESSMENT — PATIENT HEALTH QUESTIONNAIRE - PHQ9: SUM OF ALL RESPONSES TO PHQ QUESTIONS 1-9: 10

## 2022-05-03 NOTE — TELEPHONE ENCOUNTER
Pt walked in to the clinic and Ofe will help her to schedule an appt to be seen for her symptoms./AR

## 2022-05-03 NOTE — LETTER
May 3, 2022    Antonette MARBELLA Horton  [address]  [address]  [place] [address]      To Whom It May Concern,     Antonette was seen in clinic today, 05/03/22. Please excuse her for the duration of this visit.        Sincerely,         Pinky Tsai MD

## 2022-05-03 NOTE — PROGRESS NOTES
Assessment & Plan     Dysuria  Symptoms initially concerning for UTI, especially in the setting of pregnancy.  However, her UA is unremarkable, therefore I do suspect that her UC would also be unrevealing as well.  Discussed with her to continue drinking water, and to follow-up closely with her PCP for reassessment of symptoms.  Moreover, discussed with her that we should await UC results for more definitive answers in terms of treatment options.  She expressed understanding of this.  - UA reflex to Microscopic  - Urine Culture    Vaginal discharge  Vaginal discharge can be physiologic in the setting of negative wet prep today.  In particular, she was diagnosed with BV on 4/13/2022, and has received adequate treatment for this with metronidazole.  With such, I did not think that the discharge is related to ongoing BV at this time.  Recommended that she continue to monitor symptoms and to follow-up with her PCP for further discussion.  - Wet preparation    Review of prior external note(s) from - Previous office visits, urgent care visit from 4/13/2022  Review of the result(s) of each unique test - UA, wet prep, gonorrhea, chlamydia  30 minutes spent on the date of the encounter doing chart review, history and exam, documentation and further activities per the note       Depression Screening Follow Up    PHQ 5/3/2022   PHQ-9 Total Score 10   Q9: Thoughts of better off dead/self-harm past 2 weeks Not at all       Follow Up Actions Taken  Referred patient back to PCP     Recommended following up closely with her PCP to discuss symptoms and mood.    Pinky Tsai MD PGY2  [facility]  Precepted with Dr. Aletha Castorena   Antonette Horton is a 31 year old who presents for the following health issues     HPI     Presents for discussion of dysuria.  She says that she has been having symptoms for the past 2 to 3 days, and associated lower abdominal pain.  She says she also has lower back pain,  though no mid back pain.  No malodorous urine, no vaginal spotting or bleeding noted as well.  She mentions some nausea at this present time, though she says that she just drink glucose for her glucose tolerance test.  Denies any fevers, chills.    She then mentioned having thicker discharge when she went to the bathroom to leave urine sample today, therefore she is interested in wet prep testing at this time.    Review of Systems   Complete ROS normal aside noted in HPI      Objective    /69 (BP Location: Left arm, Patient Position: Sitting, Cuff Size: Adult Regular)   Pulse 83   Temp 98.4  F (36.9  C) (Oral)   Resp 18   Wt 67.6 kg (149 lb)   LMP 10/29/2021 (Exact Date)   SpO2 100%   BMI 24.79 kg/m    Body mass index is 24.79 kg/m .    Physical Exam   GENERAL: healthy, alert and no distress  RESP: lungs clear to auscultation - no rales, rhonchi or wheezes  CV: regular rate and rhythm, normal S1 S2, no S3 or S4, no murmur, click or rub, no peripheral edema and peripheral pulses strong  ABDOMEN: gravid,   MS: no gross musculoskeletal defects noted, no edema    Results for orders placed or performed in visit on 05/03/22 (from the past 24 hour(s))   UA reflex to Microscopic   Result Value Ref Range    Color Urine Yellow Colorless, Straw, Light Yellow, Yellow    Appearance Urine Clear Clear    Glucose Urine Negative Negative mg/dL    Bilirubin Urine Negative Negative    Ketones Urine Negative Negative mg/dL    Specific Gravity Urine 1.020 1.005 - 1.030    Blood Urine Negative Negative    pH Urine 7.0 5.0 - 8.0    Protein Albumin Urine Negative Negative mg/dL    Urobilinogen Urine 0.2 0.2, 1.0 E.U./dL    Nitrite Urine Negative Negative    Leukocyte Esterase Urine Negative Negative    Narrative    Microscopic not indicated   Wet preparation    Specimen: Vagina; Swab   Result Value Ref Range    Trichomonas Absent Absent    Yeast Absent Absent    Clue Cells Absent Absent    WBCs/high power field 1+ (A)  None    Narrative    Bacteria moderate, odor none   UC pending    ----- Service Performed and Documented by Resident or Fellow ------

## 2022-05-03 NOTE — PROGRESS NOTES
Preceptor Attestation:    I discussed the patient with the resident and evaluated the patient in person. I have verified the content of the note, which accurately reflects my assessment of the patient and the plan of care.   Supervising Physician:  Ciro Pompa MD.

## 2022-05-05 LAB — BACTERIA UR CULT: NORMAL

## 2022-05-06 DIAGNOSIS — D64.9 ANEMIA, UNSPECIFIED TYPE: Primary | ICD-10-CM

## 2022-05-06 RX ORDER — FERROUS SULFATE 325(65) MG
325 TABLET ORAL
Qty: 90 TABLET | Refills: 3 | Status: SHIPPED | OUTPATIENT
Start: 2022-05-06 | End: 2023-02-28

## 2022-05-11 ENCOUNTER — HOSPITAL ENCOUNTER (OUTPATIENT)
Facility: CLINIC | Age: 32
Discharge: HOME | End: 2022-05-11
Attending: FAMILY MEDICINE | Admitting: STUDENT IN AN ORGANIZED HEALTH CARE EDUCATION/TRAINING PROGRAM
Payer: COMMERCIAL

## 2022-05-11 ENCOUNTER — APPOINTMENT (OUTPATIENT)
Dept: ULTRASOUND IMAGING | Facility: CLINIC | Age: 32
End: 2022-05-11
Attending: OBSTETRICS & GYNECOLOGY
Payer: COMMERCIAL

## 2022-05-11 ENCOUNTER — HOSPITAL ENCOUNTER (OUTPATIENT)
Facility: CLINIC | Age: 32
End: 2022-05-11
Admitting: STUDENT IN AN ORGANIZED HEALTH CARE EDUCATION/TRAINING PROGRAM
Payer: COMMERCIAL

## 2022-05-11 ENCOUNTER — TELEPHONE (OUTPATIENT)
Dept: FAMILY MEDICINE | Facility: CLINIC | Age: 32
End: 2022-05-11

## 2022-05-11 VITALS
TEMPERATURE: 98.8 F | OXYGEN SATURATION: 100 % | RESPIRATION RATE: 16 BRPM | HEART RATE: 80 BPM | DIASTOLIC BLOOD PRESSURE: 72 MMHG | WEIGHT: 149 LBS | HEIGHT: 65 IN | SYSTOLIC BLOOD PRESSURE: 124 MMHG | BODY MASS INDEX: 24.83 KG/M2

## 2022-05-11 DIAGNOSIS — G47.09 OTHER INSOMNIA: Primary | ICD-10-CM

## 2022-05-11 PROBLEM — Z36.89 ENCOUNTER FOR TRIAGE IN PREGNANT PATIENT: Status: ACTIVE | Noted: 2022-05-11

## 2022-05-11 LAB
ALBUMIN UR-MCNC: NEGATIVE MG/DL
AMPHETAMINES UR QL SCN: ABNORMAL
ANION GAP SERPL CALCULATED.3IONS-SCNC: 8 MMOL/L (ref 5–18)
APPEARANCE UR: CLEAR
BARBITURATES UR QL: ABNORMAL
BENZODIAZ UR QL: ABNORMAL
BILIRUB UR QL STRIP: NEGATIVE
BUN SERPL-MCNC: 3 MG/DL (ref 8–22)
CALCIUM SERPL-MCNC: 8.9 MG/DL (ref 8.5–10.5)
CANNABINOIDS UR QL SCN: ABNORMAL
CHLORIDE BLD-SCNC: 107 MMOL/L (ref 98–107)
CLUE CELLS: ABNORMAL
CO2 SERPL-SCNC: 23 MMOL/L (ref 22–31)
COCAINE UR QL: ABNORMAL
COLOR UR AUTO: NORMAL
CREAT SERPL-MCNC: 0.61 MG/DL (ref 0.6–1.1)
CREAT UR-MCNC: 54 MG/DL
CREAT UR-MCNC: 54 MG/DL
ERYTHROCYTE [DISTWIDTH] IN BLOOD BY AUTOMATED COUNT: 13.2 % (ref 10–15)
GFR SERPL CREATININE-BSD FRML MDRD: >90 ML/MIN/1.73M2
GLUCOSE BLD-MCNC: 78 MG/DL (ref 70–125)
GLUCOSE UR STRIP-MCNC: NEGATIVE MG/DL
HCT VFR BLD AUTO: 31.7 % (ref 35–47)
HGB BLD-MCNC: 10 G/DL (ref 11.7–15.7)
HGB UR QL STRIP: NEGATIVE
HOLD SPECIMEN: NORMAL
KETONES UR STRIP-MCNC: NEGATIVE MG/DL
LEUKOCYTE ESTERASE UR QL STRIP: NEGATIVE
MCH RBC QN AUTO: 23.1 PG (ref 26.5–33)
MCHC RBC AUTO-ENTMCNC: 31.5 G/DL (ref 31.5–36.5)
MCV RBC AUTO: 73 FL (ref 78–100)
METHADONE UR QL SCN: ABNORMAL
NITRATE UR QL: NEGATIVE
OPIATES UR QL SCN: ABNORMAL
OXYCODONE UR QL: ABNORMAL
PCP UR QL SCN: ABNORMAL
PH UR STRIP: 7 [PH] (ref 5–7)
PLATELET # BLD AUTO: 244 10E3/UL (ref 150–450)
POTASSIUM BLD-SCNC: 3.7 MMOL/L (ref 3.5–5)
RBC # BLD AUTO: 4.33 10E6/UL (ref 3.8–5.2)
RUPTURE OF FETAL MEMBRANES BY ROM PLUS: NEGATIVE
SODIUM SERPL-SCNC: 138 MMOL/L (ref 136–145)
SP GR UR STRIP: 1.01 (ref 1–1.03)
TRICHOMONAS, WET PREP: ABNORMAL
UROBILINOGEN UR STRIP-MCNC: <2 MG/DL
WBC # BLD AUTO: 10.7 10E3/UL (ref 4–11)
WBC'S/HIGH POWER FIELD, WET PREP: ABNORMAL
YEAST, WET PREP: ABNORMAL

## 2022-05-11 PROCEDURE — 87210 SMEAR WET MOUNT SALINE/INK: CPT | Performed by: FAMILY MEDICINE

## 2022-05-11 PROCEDURE — 84112 EVAL AMNIOTIC FLUID PROTEIN: CPT | Performed by: FAMILY MEDICINE

## 2022-05-11 PROCEDURE — 80048 BASIC METABOLIC PNL TOTAL CA: CPT | Performed by: STUDENT IN AN ORGANIZED HEALTH CARE EDUCATION/TRAINING PROGRAM

## 2022-05-11 PROCEDURE — G0463 HOSPITAL OUTPT CLINIC VISIT: HCPCS

## 2022-05-11 PROCEDURE — 81003 URINALYSIS AUTO W/O SCOPE: CPT | Performed by: FAMILY MEDICINE

## 2022-05-11 PROCEDURE — 80349 CANNABINOIDS NATURAL: CPT | Performed by: STUDENT IN AN ORGANIZED HEALTH CARE EDUCATION/TRAINING PROGRAM

## 2022-05-11 PROCEDURE — 80307 DRUG TEST PRSMV CHEM ANLYZR: CPT | Performed by: STUDENT IN AN ORGANIZED HEALTH CARE EDUCATION/TRAINING PROGRAM

## 2022-05-11 PROCEDURE — 85027 COMPLETE CBC AUTOMATED: CPT | Performed by: FAMILY MEDICINE

## 2022-05-11 PROCEDURE — 76815 OB US LIMITED FETUS(S): CPT

## 2022-05-11 PROCEDURE — 36415 COLL VENOUS BLD VENIPUNCTURE: CPT | Performed by: FAMILY MEDICINE

## 2022-05-11 PROCEDURE — 36415 COLL VENOUS BLD VENIPUNCTURE: CPT | Performed by: STUDENT IN AN ORGANIZED HEALTH CARE EDUCATION/TRAINING PROGRAM

## 2022-05-11 RX ORDER — LIDOCAINE 40 MG/G
CREAM TOPICAL
Status: DISCONTINUED | OUTPATIENT
Start: 2022-05-11 | End: 2022-05-11 | Stop reason: HOSPADM

## 2022-05-11 RX ORDER — LANOLIN ALCOHOL/MO/W.PET/CERES
3 CREAM (GRAM) TOPICAL
Qty: 90 TABLET | Refills: 3 | Status: SHIPPED | OUTPATIENT
Start: 2022-05-11 | End: 2023-02-28

## 2022-05-11 NOTE — TELEPHONE ENCOUNTER
Pt is 27w5d pregnant and states that she was standing on a chair and it collapsed on Monday.  She states that she fell hard onto her knees and did not hit her belly.  She states that both knees are a little swollen and bruised and worse on the left.  She states that she is now having pain on the top of her pelvis all around.  She states that pain comes and goes when she is sitting and constant when she is standing.  Baby is active.  [facility] as [facility] is close to her work.  Routed note to Dr Garcia./AR

## 2022-05-11 NOTE — PROGRESS NOTES
D:  Orders received from Dr. Henderson, Dr. Stanford (in house OB) in to see patient and performed sterile vaginal exam. Antonette also report nausea and that she fell on her knees yesterday, which appear swollen and bruised.   D: Ultrasound ordered.  Antonette is very anxious and worried.  She states that FOB is not involved and that she is on her own.  She has two older daughters and is very worried about work, caring for her children, and feeling over whelmed by her pregnancy.  Emotional support and nourishment offered.

## 2022-05-11 NOTE — H&P
[facility] LABOR & DELIVERY   [facility] H AND P CONSULTATION    NAME:Antonette Horton  : 1990   MRN: [id number]   GESTATIONAL AGE: 27w5d    ADMISSION DATE: 2022     PCP:  Asuncion Garcia     CHIEF COMPLAINT:  Pelvic pain/pressure    HPI: I have been requested by Dr. Cathleen Bass to evaluate Antonette Horton for multiple complaints. Patient is a 31 year old,  female @ 27w5d gestation. History obtained through the patient and chart review.  Patient reports abdominal discomfort for the last week.  She states the pain seems to come and go.  She denies any vaginal discharge.  She reports occasional vaginal itching.  She denies any loss of fluid.  Patient denies any history of  deliveries.  Additionally, patient reports having fallen from a chair in her closet 2 days ago.  She landed on her knees bilaterally.  There is significant bruising on both knees.  She states she did not hit her abdomen with the fall.  Patient also reports nausea and vomiting.  She states that she has not eaten today.  Patient reports good fetal movement.  Patient was previously worked up by the resident, Dr. Cathleen Matthews.  She had collected a urinary analysis, a wet prep, and blood work that was all normal.    DIAGNOSIS COMPLICATING PREGNANCY  Complex social issues  MJ use in pregnancy  Recent fall with trauma to bilateral knees.  COVID in pregnancy    OBSTETRICAL HISTORY:         PAST MEDICAL HISTORY:  Past Medical History:   Diagnosis Date     Known health problems: none         PAST SURGICAL HISTORY:  Past Surgical History:   Procedure Laterality Date     DILATION AND CURETTAGE       NO HISTORY OF SURGERY          SOCIAL HISTORY:   reports that she quit smoking about 16 months ago. Her smoking use included cigarettes. She has never used smokeless tobacco. She reports previous alcohol use. She reports current drug use. Drug: Marijuana.     MEDICATIONS:  No current facility-administered  "medications on file prior to encounter.  No current outpatient medications on file prior to encounter.       ALLERGIES:  Allergies   Allergen Reactions     Sulfa Drugs Hives     Sulfamethoxazole-Trimethoprim Hives     Bactrim [Sulfamethoxazole W/Trimethoprim] Hives     Trimethoprim Itching, Nausea, Rash and Nausea and Vomiting        REVIEW OF SYSTEMS   Negative except what is stated in the HPI    PHYSICAL EXAM:  /72 (BP Location: Right arm, Patient Position: [place]'s)   Pulse 80   Temp 98.8  F (37.1  C) (Oral)   Resp 16   Ht 1.651 m (5' 5\")   Wt 67.6 kg (149 lb)   LMP 10/29/2021 (Exact Date)   SpO2 100%   BMI 24.79 kg/m    GEN: NAD; Alert and oriented, appropriate affect.  HEENT  negative  Heart       Lungs      Abdomen   Abdomen soft, non-tender. BS normal. No masses, organomegaly  Extremities  Normal, Warm, No cyanosis, no clubbing, nontender and 1+ edema; significant bruising and edema of her knees bilaterally.    Vaginal exam: closed; bulge appreciated in the vagina; unfortunately exam performed before collecting fetal fibronectin  Membranes: intact    Fetal heart Rate Tracing: reactive and reassuring  Contractions: acontractile    LABS:        Pertinent Studies:     Serum Glucose range: Recent Labs   Lab 22  1601   GLC 78       CBC:   Recent Labs   Lab 22  1601   WBC 10.7   HGB 10.0*   HCT 31.7*   MCV 73*        Chemistry:   Recent Labs   Lab 22  1601      POTASSIUM 3.7   CHLORIDE 107   CO2 23   BUN 3*   CR 0.61   GFRESTIMATED >90   MARILYN 8.9     IMPRESSION:  31 year old  @ 27w5d   Unusual bulge in the vagina  Evaluation for  labor    RECOMMENDATIONS:  Plan ultrasound to evaluate cervical length  Hydration and p.o. intake    Time spent at the bedside 30 minutes  Thank you for allowing us to participate in the care of this patient.  Please contact us with any questions/concerns.      Morelia Stanford, DO on 2022 at 6:56 PM      "

## 2022-05-11 NOTE — LETTER
Antonette Horton was seen and treated at [facility] labor and delivery unit on 5/11/2022.  She may return to work on 5/12/2022. Please allow patient to work from home if able for the foreseeable future. This request will be reassessed at future OB visits.      If you have any questions or concerns, please don't hesitate to call.        Lucia Jane MD PGY-1  [facility] - [facility]

## 2022-05-11 NOTE — PROGRESS NOTES
31 year old  at 27w5d presents to [facility] 2 days after falling onto knees. No definite abdominal trauma. However 24 hours later developed crampy abdominal discomfort, intensity at times was similar to contractions. Basic testing including TOCO, EFM and bloodwork reassuring. GI issue is most likely, but agree with [facility] consult to further evaluate for potential OB related causes.     Juvenal Altamirano MD

## 2022-05-11 NOTE — H&P
OB Triage Note        Assessment and Plan:     Antonette Horton is a 31 year old  at 27w5d not in labor. Membranes are intact. GBS status is unknown.  Presents with cramping following fall onto knees on 22. NST and ultrasound is reassuring.    Patient Active Problem List   Diagnosis     Retroflexion of uterus     Migraine without aura     H/O gonorrhea     Supervision of high risk pregnancy, antepartum     Tetrahydrocannabinol (THC) use disorder, mild, abuse     History of depression     Pregnancy complicated by previous  labor     Encounter for triage in pregnant patient     Plan  1) Discharge home with close follow-up in clinic early next week with Dr. Asuncion Garcia.  2) Encouraged to increase water intake, take acetaminophen, and take hot showers to help with body aches, headache, and lower abdominal pain. It is thought that lower abdominal pain and back pain is coming from myalgias after a fall from a chair to bilateral knees 2 days ago.    -No infectious reason for contractions.    -CBC: anemic    -BMP: within normal limits    -Urine drug screen: positive for cannabinoids    -ROM Plus, Wet prep, Urinalysis: negative   -In house OB consulted was consulted. Performed cervical exam and noticed bulge in vagina canal.Fetal fibronectin at this time cannot be completed. Ultrasound completed showing cervix of 4.2 with normal placenta. OB has okayed patient for discharge.  3) Patient likely would benefit from social work consult in outpatient clinic  4) Patient prescribed melatonin for sleep aid  5) Given note for missing work today for assessment. Also requested she work from home until further notice. This can be discussed at future OB appointments      Patient discussed with attending physician, Dr. Juvenal Altamirano, who agrees with the plan.        Lucia Jane MD PGY1 2022  [facility] Family Medicine Residency Program       Subjective:     Antonette Horton is a  31 year  old female at 27w5d with a current prenatal history significant for  labor and marijuana use in pregnancy who presents to OB triage with abdominal pain and cramping for the past day.  Patient had a fall to her knees on 22. Did not fall on abdomen. Started experiencing sharp, crampy pain and tensing of stomach last night. Awoke from sleep. Cramping is constant when standing and intermittent every 2-3 minutes when sitting or laying down. It is worse with movement. She endorses a hot shower improves the pain.    She has felt nauseous since and has not been able to tolerate solids without nausea and worsening pain. Tolerating liquids. Emesis x1 this morning. Last meal was last night.     She also reports blood mixed in stool. She has not been constipated, and if anything feels like she has been stooling more than usual. Recently started on oral iron supplementation due to anemia.     Antonette Horton is a patient of Asuncion Burrows from [facility].     She reports regular contractions starting at last night, and occurring every 2-3 minutes. She denies fluid leakage. She denies bleeding per vagina. Fetal movement is .normal.  Estimated Date of Delivery: Aug 5, 2022 Patient's last menstrual period was 10/29/2021 (exact date).       Her prenatal course has been complicated by anemia, recent fall with trauma to bilateral knees, complex social issues, and marijuana use.    Prenatal labs:   Lab Results   Component Value Date    AS Negative 2022    HEPBANG Nonreactive 2022    CHPCRT Negative 2022    GCPCRT Negative 2022    HGB 10.0 (L) 2022          Review of Systems:   See H&P    Positive for ankles swelling, chest tightness with deep breath, headaches, some vision changes, increased BM, and balance concerns.      Negative for diarrhea, hematemesis, cough, sore throat, or difficulty swallowing.          Physical Exam:   Vitals:   /72 (BP Location:  "Right arm, Patient Position: Semi-Groves's)   Pulse 80   Temp 98.8  F (37.1  C) (Oral)   Resp 16   Ht 1.651 m (5' 5\")   Wt 67.6 kg (149 lb)   LMP 10/29/2021 (Exact Date)   SpO2 100%   BMI 24.79 kg/m    149 lbs 0 oz  Estimated body mass index is 24.79 kg/m  as calculated from the following:    Height as of this encounter: 1.651 m (5' 5\").    Weight as of this encounter: 67.6 kg (149 lb).    GEN: Awake, alert in mild distress  HEENT: grossly normal  NECK: no lymphadenopathy or thryoidomegaly  RESPIRATORY: clear to auscultation bilaterally, no increased work of breathing  BACK:  no costovertebral angle tenderness, diffuse back pain and paraspinal muscle tightness   CARDIOVASCULAR: RRR, no murmur  ABDOMEN: gravid, diffusely slight tenderness to palpation, involuntary guarding, hyperactive bowel sounds  EXT:  Ecchymoses and edema to bilateral knees with patient's left being worse than the right, trace edema, no calf tenderness    NST interpretation:  Baseline rate 150 normal  Accelerations present  Decelerations not present  Interpretation: reactive    Labs today:  Results for orders placed or performed during the hospital encounter of 05/11/22   US OB >14 Weeks Limited wo Fetal Measurement     Status: None    Narrative    EXAM: US OB LIMITED >14 WEEKS WO FETAL MEASUREMENT  LOCATION: [facility]  DATE/TIME: 5/11/2022 7:49 PM    INDICATION: bulge on vaginal exam.  check cervical length.  comment on placenta secondary to recent fall.  COMPARISON: None.  TECHNIQUE: Transabdominal and endovaginal ultrasound.    FINDINGS:    Single living fetus, breech presentation.    HEART RATE: 150 bpm.  SDP 4. cm.  PLACENTA: Posterior and fundal. No previa.  CERVIX: 4.2.    Small amount of pericardial fluid noted, less than 2 mm, likely physiologic.      Impression    IMPRESSION:  1.  Single living intrauterine pregnancy in breech position as described.     Rupture of Fetal Membranes by ROM Plus     " Status: Normal   Result Value Ref Range    Rupture of Fetal Membranes by ROM Plus Negative Negative, Invalid, Suggest Repeat    Narrative    It is recommended that the tests to detect rupture of the amniotic membranes should not be used without other clinical assessments to make clinical patient management decision.   UA reflex to Microscopic and Culture     Status: Normal    Specimen: Urine, Midstream   Result Value Ref Range    Color Urine Light Yellow Colorless, Straw, Light Yellow, Yellow    Appearance Urine Clear Clear    Glucose Urine Negative Negative mg/dL    Bilirubin Urine Negative Negative    Ketones Urine Negative Negative mg/dL    Specific Gravity Urine 1.008 1.001 - 1.030    Blood Urine Negative Negative    pH Urine 7.0 5.0 - 7.0    Protein Albumin Urine Negative Negative mg/dL    Urobilinogen Urine <2.0 <2.0 mg/dL    Nitrite Urine Negative Negative    Leukocyte Esterase Urine Negative Negative    Narrative    Microscopic not indicated   Drugs of Abuse 1+ Panel, Urine ([facility] Only)     Status: Abnormal   Result Value Ref Range    Amphetamines Urine Screen Negative Screen Negative    Benzodiazepines Urine Screen Negative Screen Negative    Opiates Urine Screen Negative Screen Negative    PCP Urine Screen Negative Screen Negative    Cannabinoids Urine Screen Positive (A) Screen Negative    Barbiturates Urine Screen Negative Screen Negative    Cocaine Urine Screen Negative Screen Negative    Methadone Urine Screen Negative Screen Negative    Oxycodone Urine Screen Negative Screen Negative    Creatinine Urine mg/dL 54 mg/dL    Narrative    Drug                           Screening Threshold    Amphetamines                    1000 ng/mL  Benzodiazepine                   200 ng/mL  Opiates                          300 ng/mL  Phencyclidine                     25 ng/mL  THC Metabolite                    50 ng/mL  Barbiturates                     200 ng/mL  Cocaine Metabolite               150 ng/mL  Methadone                         300 ng/mL  Oxycodone                        100 ng/mL    Screening results are to be used only for medical purposes.  Unconfirmed screening results are not to be used for non-  medical purposes.   Urine Creatinine for Drug Screen Panel     Status: None   Result Value Ref Range    Creatinine Urine for Drug Screen 54 mg/dL   CBC with platelets     Status: Abnormal   Result Value Ref Range    WBC Count 10.7 4.0 - 11.0 10e3/uL    RBC Count 4.33 3.80 - 5.20 10e6/uL    Hemoglobin 10.0 (L) 11.7 - 15.7 g/dL    Hematocrit 31.7 (L) 35.0 - 47.0 %    MCV 73 (L) 78 - 100 fL    MCH 23.1 (L) 26.5 - 33.0 pg    MCHC 31.5 31.5 - 36.5 g/dL    RDW 13.2 10.0 - 15.0 %    Platelet Count 244 150 - 450 10e3/uL   Basic metabolic panel     Status: Abnormal   Result Value Ref Range    Sodium 138 136 - 145 mmol/L    Potassium 3.7 3.5 - 5.0 mmol/L    Chloride 107 98 - 107 mmol/L    Carbon Dioxide (CO2) 23 22 - 31 mmol/L    Anion Gap 8 5 - 18 mmol/L    Urea Nitrogen 3 (L) 8 - 22 mg/dL    Creatinine 0.61 0.60 - 1.10 mg/dL    Calcium 8.9 8.5 - 10.5 mg/dL    Glucose 78 70 - 125 mg/dL    GFR Estimate >90 >60 mL/min/1.73m2   Extra Tube ([facility] Draw)     Status: None (In process)    Narrative    The following orders were created for panel order Extra Tube ([facility] Draw).  Procedure                               Abnormality         Status                     ---------                               -----------         ------                     Extra Red Top Tube[[id number]]                               In process                   Please view results for these tests on the individual orders.   Wet preparation     Status: Abnormal    Specimen: Vagina; Swab   Result Value Ref Range    Trichomonas Absent Absent    Yeast Absent Absent    Clue Cells Absent Absent    WBCs/high power field 1+ (A) None   THC Confirmation Quantitative Urine     Status: None (In process)    Narrative    The following orders were created for panel order THC  Confirmation Quantitative Urine.  Procedure                               Abnormality         Status                     ---------                               -----------         ------                     THC Confirmation Quantit...[[id number]]                      In process                 Urine Creatinine for Chau...[[id number]]                      Final result                 Please view results for these tests on the individual orders.

## 2022-05-12 ENCOUNTER — TELEPHONE (OUTPATIENT)
Dept: FAMILY MEDICINE | Facility: CLINIC | Age: 32
End: 2022-05-12
Payer: COMMERCIAL

## 2022-05-12 PROBLEM — O99.012 ANEMIA AFFECTING PREGNANCY IN SECOND TRIMESTER: Status: ACTIVE | Noted: 2022-05-03

## 2022-05-12 NOTE — DISCHARGE INSTRUCTIONS
Discharge Instruction for Undelivered Patients      You were seen for: Labor Assessment  We Consulted: Dr Henderson, Dr Stanford  You had (Test or Medicine):wet prep, UA, ROM+, Ultrasound     Diet:   Drink 8 to 12 glasses of liquids (milk, juice, water) every day.  You may eat meals and snacks.     Activity:  Call your doctor or nurse midwife if your baby is moving less than usual.     Call your provider if you notice:  Swelling in your face or increased swelling in your hands or legs.  Headaches that are not relieved by Tylenol (acetaminophen).  Changes in your vision (blurring: seeing spots or stars.)  Nausea (sick to your stomach) and vomiting (throwing up).   Weight gain of 5 pounds or more per week.  Heartburn that doesn't go away.  Signs of bladder infection: pain when you urinate (use the toilet), need to go more often and more urgently.  The bag of li (rupture of membranes) breaks, or you notice leaking in your underwear.  Bright red blood in your underwear.  Abdominal (lower belly) or stomach pain.  For first baby: Contractions (tightening) less than 5 minutes apart for one hour or more.  Second (plus) baby: Contractions (tightening) less than 10 minutes apart and getting stronger.  *If less than 34 weeks: Contractions (tightening) more than 6 times in one hour.  Increase or change in vaginal discharge (note the color and amount)      Follow-up:  Please call to make an appointment at the clinic early next week.

## 2022-05-12 NOTE — PROGRESS NOTES
Providers reviewed lab and imaging results. Orders taken to discharge and to follow up in the clinic next week.   Pt given DC instructions. She verbalized understanding and had no further questions or concerns. She had her belongings in her possession and ambulated upon dc from unit accompanied by a family member.

## 2022-05-12 NOTE — TELEPHONE ENCOUNTER
Pt states that she is feeling a little better but is still having some abd cramping.  She states that her work told her that if she gets an updated letter from the doctor to cover her the rest of the pregnancy that she can continue to work from home until the baby is born.  She states that it has been better today with being able to keep her feet up and relax while working at home.  Assisted her with scheduling PATRICIA APPT with Dr Jane on Wed, 5/18/22 at 4:30 PM.  Pt was unable to schedule with Dr Garcia as she works 9-5 and will need either very early appts or latest appts as she does not want to continue to use FTO.  Told her to call with any questions or concerns.  Routed note to Dr Jane and Dr Garcia./AR

## 2022-05-18 ENCOUNTER — OFFICE VISIT (OUTPATIENT)
Dept: FAMILY MEDICINE | Facility: CLINIC | Age: 32
End: 2022-05-18
Payer: COMMERCIAL

## 2022-05-18 VITALS
HEART RATE: 89 BPM | TEMPERATURE: 98.6 F | OXYGEN SATURATION: 98 % | WEIGHT: 151.4 LBS | SYSTOLIC BLOOD PRESSURE: 108 MMHG | BODY MASS INDEX: 25.19 KG/M2 | RESPIRATION RATE: 18 BRPM | DIASTOLIC BLOOD PRESSURE: 70 MMHG

## 2022-05-18 DIAGNOSIS — R10.2 PELVIC PAIN IN FEMALE: ICD-10-CM

## 2022-05-18 DIAGNOSIS — K59.01 SLOW TRANSIT CONSTIPATION: ICD-10-CM

## 2022-05-18 DIAGNOSIS — O09.90 SUPERVISION OF HIGH RISK PREGNANCY, ANTEPARTUM: Primary | ICD-10-CM

## 2022-05-18 PROCEDURE — 99207 PR PRENATAL VISIT: CPT | Mod: GC

## 2022-05-18 RX ORDER — POLYETHYLENE GLYCOL 3350 17 G/17G
1 POWDER, FOR SOLUTION ORAL DAILY
Qty: 850 G | Refills: 3 | Status: SHIPPED | OUTPATIENT
Start: 2022-05-18 | End: 2023-02-28

## 2022-05-18 ASSESSMENT — PATIENT HEALTH QUESTIONNAIRE - PHQ9: SUM OF ALL RESPONSES TO PHQ QUESTIONS 1-9: 9

## 2022-05-18 NOTE — LETTER
Antonette Horton was seen at the [facility] on 5/18/2022.  Please continue to have her work from home.    If you have any questions or concerns, please don't hesitate to call.        Lucia Jane MD PGY1  [facility] [place]

## 2022-05-18 NOTE — PROGRESS NOTES
Preceptor Attestation:    I discussed the patient with the resident and evaluated the patient in person. I have verified the content of the note, which accurately reflects my assessment of the patient and the plan of care.   Supervising Physician:  Jose Antonio Hong MD.

## 2022-05-18 NOTE — PROGRESS NOTES
Return OB    Assessment & Plan  31 year old  at 28w5d with BUCK Aug 5, 2022 based on LMP. At risk pregnancy due to history of  labor, mental health concerns, history of STIs, use of THC, and financial difficulties. Antonette was seen today for prenatal care and medication reconciliation.    Supervision of high risk pregnancy, antepartum  Continuing taking iron supplement and prenatal vitamin. Patient is not taking it everyday, but she is taking it on the days she remembers.    Pelvic pain in female  Patient states she has increased pain when standing. She describes her lower abdomen will then become tight. Sitting down improves this pain. Possibly could have pubic symphysis diastasis or increased growing baby with small physical frame.  - Discussed the use of abdominal binder  - Given note to have patient work from home. She has been doing this since being seen in triage on 2022, and it has helped a lot.    Slow transit constipation  Causes include iron supplementation and pregnancy.  -     polyethylene glycol (MIRALAX) 17 GM/Dose powder; Take 17 g (1 capful) by mouth daily    Weight gain adequate: 8.346 kg (18 lb 6.4 oz) to date, out of recommended total of 25-35 lbs (pregravid BMI 18.5-24.9)    There are no Patient Instructions on file for this visit.    Return to clinic in 2 weeks.      I precepted today with Jose Antonio Hong MD.    Lucia Jane MD PGY1  [facility] Family Medicine Residency    Subjective  Concerns: hemorrhoid - using creams    ROS:  YES - Headache, Took Tylenol but does not like to take medications. Only takes medications when it is unbearable for her.  No - Changes in vision  No - Chest Pain  No - Shortness of Breath  No - Nausea   No - Vomiting  YES - Abdominal pain, with standing  No - Contractions  No - Dysuria   No - Vaginal Discharge    No - Vaginal bleeding   No - Loss of Fluid   No - Extremity swelling   Present - Fetal movement       Going to WIC?  Yes    Patient Active Problem List   Diagnosis     Retroflexion of uterus     Migraine without aura     H/O gonorrhea     Supervision of high risk pregnancy, antepartum     Tetrahydrocannabinol (THC) use disorder, mild, abuse     History of depression     Pregnancy complicated by previous  labor     Encounter for triage in pregnant patient     Anemia affecting pregnancy in second trimester       Antonette Horton speaks English so an  was not used today.    Guidance:    seatbelt use  fetal growth and movement  signs of  labor    Do you need help getting a car seat? YES  Do you need help getting a breast pump? YES    Objective  /70 (BP Location: Right arm, Patient Position: Sitting, Cuff Size: Adult Regular)   Pulse 89   Temp 98.6  F (37  C) (Oral)   Resp 18   Wt 68.7 kg (151 lb 6.4 oz)   LMP 10/29/2021 (Exact Date)   SpO2 98%   BMI 25.19 kg/m    No distress.  Gravid abdomen.  .  Fundal height 28 cm.  no edema.    Results  Blood type: A POS  No results found for any visits on 22.

## 2022-05-18 NOTE — COMMUNITY RESOURCES LIST (ENGLISH)
05/18/2022   [facility] - Outpatient Clinics  November Paw  For questions about this resource list or additional care needs, please contact your primary care clinic or care manager.  Phone: [phone]   Email: N/A   Address: [address] [place] [id number]   Hours: N/A        Hotlines and Helplines       Hotline - Crisis help  1  [facility] - Adult Mental Health Services - 24-Hour Crisis Line Distance: 1.23 miles      COVID-19 Status: Phone/Virtual   [address] [place] [address]  Language: English  Hours: Mon - Sun Open 24 Hours  Fees: Free   Phone: (148) 161-9421 Email: Adults.[email or web address]. Website: [email or web address]     2  [facility] - [facility] Protection - 24-Hour Crisis Response Distance: 1.23 miles      COVID-19 Status: Phone/Virtual   [address] [place] [address]  Language: English  Hours: Mon - Sun Open 24 Hours   Phone: (631) 503-1297 Email: social.services@[facility]. Website: https://www.[facility]/HealthFamily/AdultProtection/Pages/default.aspx          Mental Health       Individual counseling  3  Dr. Dada Rankin, Ph.D. and Dr. Saige Rankin, Ph.D. - Individual Therapy Distance: 0.3 miles      COVID-19 Status: Phone/Virtual   [address] [place] [address]  Language: English  Hours: Mon - Fri 9:00 AM - 5:00 PM Appt. Only  Fees: Insurance, Self Pay   Phone: (140) [phone]     4  [facility] [place] Distance: 0.48 miles      COVID-19 Status: Regular Operations   [address] [place] [address]  Language: English  Hours: Mon - Thu 8:30 AM - 4:30 PM  Fees: Insurance, Self Pay, Sliding Fee   Phone: (340) 484-1724 Email: evolve@[employer].org Website: http://[employer].org     Mental health crisis care  5  [facility] - 24-Hour Mental Health Practitioner Distance: 2.3 miles      COVID-19  Status: Regular Operations, COVID-19 Status: Phone/Virtual   [address] [place] [address]  Language: English, Hmong  Hours: Mon - Fri 8:00 AM - 4:30 PM  Fees: Insurance, Self Pay   Phone: (778) 736-5726 Email: info@[employer] Website: http://www.[employer]/     6  [facility] Crisis Stabilization Services (Arnav Grulla) Distance: 2.58 miles      COVID-19 Status: Regular Operations   [address] [place] [address]  Language: English, Armenian  Hours: Mon - Sun Open 24 Hours  Fees: Insurance   Phone: (279) 725-1442 Email: info@[employer] Website: https://[facility] [facility].org/services-programs/residential-services/yuan-guest-house/     Mental health support group  7  [facility] Distance: 0.66 miles      COVID-19 Status: Regular Operations, COVID-19 Status: Phone/Virtual   [address] [place] [address]  Language: English  Hours: Mon - Fri 12:00 PM - 4:00 PM  Fees: Insurance   Phone: (798) 630-8421 Email: info@[employer] Website: https://[facility].org/mental-health/     8  [facility] Distance: 3.14 miles      COVID-19 Status: Phone/Virtual   [address] [address]  Language: English  Hours: Mon - Fri 8:00 AM - 4:30 PM  Fees: Free   Phone: (797) 167-9459 Website: https://www.[place].gov/find-locations/facility/vc_0416V          Important Numbers & Websites       Emergency Services   911  City Services   311  Poison Control   (908) 692-7564  Suicide Prevention Lifeline   (689) 644-6615 (TALK)  Child Abuse Hotline   (455) 846-8857 (4-A-Child)  Sexual Assault Hotline   (290) 747-5547 (HOPE)  National Runaway Safeline   (287) 735-6546 (RUNAWAY)  All-Options Talkline   (848) 546-9762  Substance Abuse Referral   (492) 991-9915 (HELP)

## 2022-05-19 NOTE — PROGRESS NOTES
5/19/22 Requested car seat for pt online through [employer].  They will contact pt once they are ready to deliver to arrange time for delivery and instruct pt on how to use car seat (usually takes 3 weeks from the time referral received to get call for delivery).  Emailed request for breast pump to [facility] [employer].  Breast pump will be delivered to pt's home./NG

## 2022-05-23 LAB
CANNABINOIDS UR CFM-MCNC: 163 NG/ML
CARBOXYTHC/CREAT UR: 302 NG/MG CREAT

## 2022-06-03 ENCOUNTER — OFFICE VISIT (OUTPATIENT)
Dept: FAMILY MEDICINE | Facility: CLINIC | Age: 32
End: 2022-06-03
Payer: COMMERCIAL

## 2022-06-03 VITALS
SYSTOLIC BLOOD PRESSURE: 119 MMHG | HEART RATE: 92 BPM | DIASTOLIC BLOOD PRESSURE: 78 MMHG | BODY MASS INDEX: 25.76 KG/M2 | RESPIRATION RATE: 20 BRPM | OXYGEN SATURATION: 100 % | TEMPERATURE: 98.4 F | WEIGHT: 154.8 LBS

## 2022-06-03 DIAGNOSIS — M54.50 ACUTE BILATERAL LOW BACK PAIN WITHOUT SCIATICA: ICD-10-CM

## 2022-06-03 DIAGNOSIS — O09.90 SUPERVISION OF HIGH RISK PREGNANCY, ANTEPARTUM: Primary | ICD-10-CM

## 2022-06-03 PROCEDURE — 99207 PR PRENATAL VISIT: CPT | Mod: GC | Performed by: STUDENT IN AN ORGANIZED HEALTH CARE EDUCATION/TRAINING PROGRAM

## 2022-06-03 NOTE — LETTER
RETURN TO WORK/SCHOOL FORM    6/3/2022    Re: Antonette Horton  1990      To Whom It May Concern:     Antonette Horton was seen in clinic today.  She may return to work on 6/4/22, but please continue to let her work from home.             Asuncion Garcia MD  6/3/2022 5:20 PM

## 2022-06-03 NOTE — PROGRESS NOTES
Assessment & Plan  31 year old  at 31w0d with BUCK Aug 5, 2022 based on LMP. High risk pregnancy due to history of  labor, financial difficulties.    Antonette was seen today for prenatal care and back pain.    Diagnoses and all orders for this visit:    Supervision of high risk pregnancy, antepartum  Wanting tubal ligation. Discussed risks and benefits, but patient does not want to sign paperwork today. Will discuss paperwork and referral further at next visit.    Needs Tdap, not done today due to time constraints.     Acute bilateral low back pain without sciatica  Having typical low back pain associated with pregnancy. Encouraged patient to schedule with DO provider for manipulation.       Weight gain adequate: 8.346 kg (18 lb 6.4 oz) to date, out of recommended total of 25-35 lbs (pregravid BMI 18.5-24.9)    There are no Patient Instructions on file for this visit.    Return to clinic in 2 weeks.    Asuncion Garcia MD  I precepted today with Juvenal Altamirano MD.    Diagnosis or treatment significantly limited by social determinants of health - limited income, low health literacy      Subjective      Concerns: having low back pain. Sometimes travels down her legs. Heat has helped somewhat.      ROS:  No - Headache  No - Changes in vision  No - Chest Pain  No - Shortness of Breath  No - Nausea   No - Vomiting  No - Abdominal pain   No - Contractions  No - Dysuria   No - Vaginal Discharge    No - Vaginal bleeding   No - Loss of Fluid   No - Extremity swelling   Present - Fetal movement       Going to WIC? Yes      Patient Active Problem List   Diagnosis     Retroflexion of uterus     Migraine without aura     H/O gonorrhea     Supervision of high risk pregnancy, antepartum     Tetrahydrocannabinol (THC) use disorder, mild, abuse     History of depression     Pregnancy complicated by previous  labor     Encounter for triage in pregnant patient     Anemia affecting pregnancy in second trimester       Antonette TYSON  White speaks English so an  was not used today.    Guidance:  fetal growth and movement  signs of  labor    Do you need help getting a car seat? No  Do you need help getting a breast pump? No    Objective  /78   Pulse 92   Temp 98.4  F (36.9  C) (Oral)   Resp 20   Wt 70.2 kg (154 lb 12.8 oz)   LMP 10/29/2021 (Exact Date)   SpO2 100%   BMI 25.76 kg/m    No distress.  Gravid abdomen.  FHT 150s.  Fundal height 32.5 cm.  no edema.    Results  Blood type: A POS  No results found for any visits on 22.

## 2022-06-04 NOTE — PROGRESS NOTES
Preceptor Attestation:    I discussed the patient with the resident and evaluated the patient in person. I have verified the content of the note, which accurately reflects my assessment of the patient and the plan of care.   Supervising Physician:  Juvenal Altamirano MD.

## 2022-06-09 ENCOUNTER — TELEPHONE (OUTPATIENT)
Dept: FAMILY MEDICINE | Facility: CLINIC | Age: 32
End: 2022-06-09
Payer: COMMERCIAL

## 2022-06-09 NOTE — TELEPHONE ENCOUNTER
"BF Answering Service Documentation    Time: 6:35 AM    Re: \"contractions since last night\"    Spoke with: patient    She has been having painful abdominal cramps since last night that do not go away with laying down they becoming more uncomfortable.  She feels like she wants to have a bowel movement but then no stool passes and the cramping persists.  She has a history of  labor.  She is 32 weeks pregnant and in the event that she is in labor, [facility] would not have staff necessary support for the .  Advised that she go to [facility] or to [place].  [place] is much closer and so she will go there.  I called labor and delivery at the [facility] [place] to let them know that patient is coming.  Patient will call with any additional questions.  I will notify her PCP, Dr. Garcia.      Rola Ramsay MD  PGY-2  [facility]  Pager: [phone]  2022 at 6:35 AM      "

## 2022-06-20 ENCOUNTER — TELEPHONE (OUTPATIENT)
Dept: FAMILY MEDICINE | Facility: CLINIC | Age: 32
End: 2022-06-20
Payer: COMMERCIAL

## 2022-06-20 NOTE — TELEPHONE ENCOUNTER
Pt is 33w3d pregnant and states that she has been having contractions all night.  She states that they woke her up every 2 hours and were coming closer than 5 minutes apart.  She states that they last for 2 minutes and are uncomfortable.  She states that she feels like she has to have a BM but nothing comes out.  She states that she did walk a lot yesterday.  She has been having increased urinary frequency and going in small amounts for the last 2 days.  Pt went to [facility] on 6/9/22 and urine culture was negative.  She also states that baby is less active today.  Told her that she needs to go to [facility] for eval now and should keep her PATRICIA appt for Thursday with Dr Garcia.  Told her to call back with further questions or concerns.  Routed note to Dr Garcia./AR

## 2022-06-25 ENCOUNTER — HEALTH MAINTENANCE LETTER (OUTPATIENT)
Age: 32
End: 2022-06-25

## 2022-06-30 ENCOUNTER — OFFICE VISIT (OUTPATIENT)
Dept: FAMILY MEDICINE | Facility: CLINIC | Age: 32
End: 2022-06-30
Payer: COMMERCIAL

## 2022-06-30 VITALS
WEIGHT: 157 LBS | DIASTOLIC BLOOD PRESSURE: 81 MMHG | RESPIRATION RATE: 16 BRPM | SYSTOLIC BLOOD PRESSURE: 115 MMHG | TEMPERATURE: 98.3 F | HEART RATE: 78 BPM | OXYGEN SATURATION: 96 % | BODY MASS INDEX: 26.13 KG/M2

## 2022-06-30 DIAGNOSIS — M54.50 ACUTE BILATERAL LOW BACK PAIN WITHOUT SCIATICA: ICD-10-CM

## 2022-06-30 DIAGNOSIS — R10.2 PELVIC PAIN IN FEMALE: ICD-10-CM

## 2022-06-30 DIAGNOSIS — O09.90 SUPERVISION OF HIGH RISK PREGNANCY, ANTEPARTUM: Primary | ICD-10-CM

## 2022-06-30 PROCEDURE — 90471 IMMUNIZATION ADMIN: CPT | Performed by: STUDENT IN AN ORGANIZED HEALTH CARE EDUCATION/TRAINING PROGRAM

## 2022-06-30 PROCEDURE — 99207 PR PRENATAL VISIT: CPT | Mod: 25 | Performed by: STUDENT IN AN ORGANIZED HEALTH CARE EDUCATION/TRAINING PROGRAM

## 2022-06-30 PROCEDURE — 90715 TDAP VACCINE 7 YRS/> IM: CPT | Performed by: STUDENT IN AN ORGANIZED HEALTH CARE EDUCATION/TRAINING PROGRAM

## 2022-06-30 NOTE — PROGRESS NOTES
Preceptor Attestation:    I discussed the patient with the resident and evaluated the patient in person. I have verified the content of the note, which accurately reflects my assessment of the patient and the plan of care.   Supervising Physician:  Willy Chappell MD.

## 2022-06-30 NOTE — PROGRESS NOTES
Assessment & Plan  31 year old  at 34w6d with BUCK Aug 5, 2022 based on LMP. High risk pregnancy due to history of  labor, financial difficulties. Having radha-reardon contractions but not in labor. Encouraged hydration. Encouraged follow up with OMT for low back pain.     Antonette was seen today for prenatal care.    Diagnoses and all orders for this visit:    Supervision of high risk pregnancy, antepartum    Acute bilateral low back pain without sciatica    Pelvic pain in female    Other orders  -     TDAP VACCINE (Adacel, Boostrix)  [[id number]]        Weight gain adequate: 10.9 kg (24 lb) to date, out of recommended total of 25-35 lbs (pregravid BMI 18.5-24.9)    There are no Patient Instructions on file for this visit.    Return to clinic in 2 weeks.    Asuncion Garcia MD  I precepted today with Willy Chappell MD.          Subjective  Concerns: Pain in her pelvis and back. Kicks are more painful. Belly band helps a little bit. Feeling some radha reardon contractions every time she walks, improve with rest. Feeling them everyday.      ROS:  No - Headache  No - Changes in vision  No - Chest Pain  No - Shortness of Breath  No - Nausea   No - Vomiting  No - Abdominal pain   YES - Contractions  No - Dysuria   No - Vaginal Discharge    No - Vaginal bleeding   No - Loss of Fluid   No - Extremity swelling   Present - Fetal movement       Going to WIC? Yes    Patient Active Problem List   Diagnosis     Retroflexion of uterus     Migraine without aura     H/O gonorrhea     Supervision of high risk pregnancy, antepartum     Tetrahydrocannabinol (THC) use disorder, mild, abuse     History of depression     Pregnancy complicated by previous  labor     Encounter for triage in pregnant patient     Anemia affecting pregnancy in second trimester       Antonette Horton speaks English so an  was not used today.    Guidance:   birth control  travel  warning signs/PIH    Do you need help getting a car seat?  No  Do you need help getting a breast pump? No    Objective  /81 (BP Location: Left arm, Patient Position: Sitting, Cuff Size: Adult Regular)   Pulse 78   Temp 98.3  F (36.8  C) (Oral)   Resp 16   Wt 71.2 kg (157 lb)   LMP 10/29/2021 (Exact Date)   SpO2 96%   BMI 26.13 kg/m    No distress.  Gravid abdomen.  .  Fundal height 35 cm.  no edema.    Results  Blood type: A POS  No results found for any visits on 06/30/22.

## 2022-07-07 ENCOUNTER — OFFICE VISIT (OUTPATIENT)
Dept: FAMILY MEDICINE | Facility: CLINIC | Age: 32
End: 2022-07-07
Payer: COMMERCIAL

## 2022-07-07 VITALS
SYSTOLIC BLOOD PRESSURE: 111 MMHG | TEMPERATURE: 98.9 F | WEIGHT: 158.6 LBS | OXYGEN SATURATION: 100 % | RESPIRATION RATE: 18 BRPM | HEART RATE: 82 BPM | DIASTOLIC BLOOD PRESSURE: 77 MMHG | BODY MASS INDEX: 26.39 KG/M2

## 2022-07-07 DIAGNOSIS — M99.9 NONALLOPATHIC LESION OF SACRAL REGION: ICD-10-CM

## 2022-07-07 DIAGNOSIS — M99.05 SOMATIC DYSFUNCTION OF PELVIC REGION: ICD-10-CM

## 2022-07-07 DIAGNOSIS — M99.9 NONALLOPATHIC LESION OF LUMBAR REGION: ICD-10-CM

## 2022-07-07 DIAGNOSIS — M99.9 NONALLOPATHIC LESION OF CERVICAL REGION: ICD-10-CM

## 2022-07-07 DIAGNOSIS — M99.00 SOMATIC DYSFUNCTION OF HEAD REGION: ICD-10-CM

## 2022-07-07 DIAGNOSIS — M99.9 NONALLOPATHIC LESION OF LOWER EXTREMITIES: ICD-10-CM

## 2022-07-07 PROCEDURE — 98927 OSTEOPATH MANJ 5-6 REGIONS: CPT | Mod: GC | Performed by: STUDENT IN AN ORGANIZED HEALTH CARE EDUCATION/TRAINING PROGRAM

## 2022-07-07 NOTE — PATIENT INSTRUCTIONS
Thank you for coming into the clinic today!  Here is what we discussed:  Perform the stretches we discussed  Follow up as needed with me for muscular pain   Follow up with Dr. Garcia as directed for OB care    If you have any questions, please call the clinic at [phone].

## 2022-07-07 NOTE — PROGRESS NOTES
OMT Visit  Subjective   Antonette Horton is a 31 year old year old female who is being seen today for:  Chief Complaint   Patient presents with     RECHECK     OMT, Lower back pain per pt      HPI: Worsening low back pain. Achy back pain. Worsened by sitting, bending forward or backwards.   No alleviating factors.   Tylenol doesn't help.     Goal: Increase ROM, Decrease pain    Past Medical History:   Diagnosis Date     Known health problems: none      Social History     Tobacco Use     Smoking status: Former Smoker     Types: Cigarettes     Quit date:      Years since quittin.5     Smokeless tobacco: Never Used     Tobacco comment: vape   Substance Use Topics     Alcohol use: Not Currently     Drug use: Not Currently     Types: Marijuana     Comment: less than pre-pregnancy     Social Determinants of Health     Tobacco Use: Medium Risk     Smoking Tobacco Use: Former Smoker     Smokeless Tobacco Use: Never Used   Alcohol Use: Not on file   Financial Resource Strain: Medium Risk     Difficulty of Paying Living Expenses: Somewhat hard   Food Insecurity: No Food Insecurity     Worried About Running Out of Food in the Last Year: Never true     Ran Out of Food in the Last Year: Never true   Transportation Needs: No Transportation Needs     Lack of Transportation (Medical): No     Lack of Transportation (Non-Medical): No   Physical Activity: Not on file   Stress: Not on file   Social Connections: Not on file   Intimate Partner Violence: Not on file   Depression: Not at risk     PHQ-2 Score: 2   Housing Stability: Not on file      Current Outpatient Medications   Medication     ferrous sulfate (FEROSUL) 325 (65 Fe) MG tablet     Prenatal Vit-Fe Fumarate-FA (PRENATAL MULTIVITAMIN W/IRON) 27-0.8 MG tablet     acetaminophen (TYLENOL) 325 MG tablet     cetirizine (ZYRTEC) 10 MG tablet     doxylamine (UNISOM) 25 MG TABS tablet     guaiFENesin (MUCINEX) 600 MG 12 hr tablet     melatonin 3 MG tablet     polyethylene glycol  (MIRALAX) 17 GM/Dose powder     pyridOXINE (VITAMIN B6) 25 MG tablet     No current facility-administered medications for this visit.       Objective     Exam  General: alert, pleasant and interactive  C spine - tight paracervical muscles  Gross Exam: Gravid abdomen, anterior pelvic tilt   Lumbar Dysfunction: L5: Flexed, Sidebent left and Rotated left and Paraspinal Muscle: ropiness and tenderness  Pelvic Dysfunction: right and anterior innominate  Sacrum Dysfunction: Torsion: left on left  Extremity: left hip tight piriformis and adductors.     Tests  Straight leg raise: negative, Seated flexion test positive: right and SI Joint tenderness: right    Assessment & Plan   Diagnoses and all orders for this visit:  Somatic dysfunction of head region  -     OSTEOPATHIC MANIP,5-6 BODY REGN  Nonallopathic lesion of cervical region  -     OSTEOPATHIC MANIP,5-6 BODY REGN  Nonallopathic lesion of lumbar region  -     OSTEOPATHIC MANIP,5-6 BODY REGN  Nonallopathic lesion of sacral region  -     OSTEOPATHIC MANIP,5-6 BODY REGN  Somatic dysfunction of pelvic region  -     OSTEOPATHIC MANIP,5-6 BODY REGN  Nonallopathic lesion of lower extremities  -     OSTEOPATHIC MANIP,5-6 BODY REGN       muscle energy: direct, Myofascial: direct, counterstrain, Still's techniques and balancing ligaments/torque unwinding  Occipital tension release performed     Risk and benefits of therapies explained to patient, who agreed with treatment today. Potential side effects of increased warmth, increased ROM, decreased pain, and transient increased pain that should decrease with time were explained to patient before treatment. Patient agreed to evaluation and treatment with OMT  Benefits experienced today: Increase ROM, Decrease pain, Increase warmth to area   Unfortunately still had suprapubic abdominal pain, but low back pain and hip pain mildly relieved.       Continue heating pads and abdominal binders as needed    Follow Up:  Return if symptoms  worsen or fail to improve.    Patient was staffed with supervising physician, Dr. Rj Guerin, who agrees with the findings and plan.     Chemo Jin DO, PGY3  [facility]

## 2022-07-07 NOTE — PROGRESS NOTES
Preceptor Attestation:    I discussed the patient with the resident and evaluated the patient in person. I was present for and supervised the key portions of the procedure. I have verified the content of the note, which accurately reflects my assessment of the patient and the plan of care.   Supervising Physician:  Rj Guerin MD.

## 2022-07-11 ENCOUNTER — OFFICE VISIT (OUTPATIENT)
Dept: FAMILY MEDICINE | Facility: CLINIC | Age: 32
End: 2022-07-11
Payer: COMMERCIAL

## 2022-07-11 DIAGNOSIS — B96.89 BACTERIAL VAGINITIS: ICD-10-CM

## 2022-07-11 DIAGNOSIS — N76.0 BACTERIAL VAGINITIS: ICD-10-CM

## 2022-07-11 DIAGNOSIS — B96.89 BACTERIAL VAGINOSIS IN PREGNANCY: ICD-10-CM

## 2022-07-11 DIAGNOSIS — O23.599 BACTERIAL VAGINOSIS IN PREGNANCY: ICD-10-CM

## 2022-07-11 DIAGNOSIS — R09.81 NASAL CONGESTION: ICD-10-CM

## 2022-07-11 DIAGNOSIS — O09.90 SUPERVISION OF HIGH RISK PREGNANCY, ANTEPARTUM: Primary | ICD-10-CM

## 2022-07-11 LAB
CLUE CELLS: PRESENT
TRICHOMONAS, WET PREP: ABNORMAL
WBC'S/HIGH POWER FIELD, WET PREP: ABNORMAL
YEAST, WET PREP: ABNORMAL

## 2022-07-11 PROCEDURE — 87210 SMEAR WET MOUNT SALINE/INK: CPT | Performed by: STUDENT IN AN ORGANIZED HEALTH CARE EDUCATION/TRAINING PROGRAM

## 2022-07-11 PROCEDURE — 59426 ANTEPARTUM CARE ONLY: CPT | Mod: GC | Performed by: STUDENT IN AN ORGANIZED HEALTH CARE EDUCATION/TRAINING PROGRAM

## 2022-07-11 PROCEDURE — 87653 STREP B DNA AMP PROBE: CPT | Performed by: STUDENT IN AN ORGANIZED HEALTH CARE EDUCATION/TRAINING PROGRAM

## 2022-07-11 RX ORDER — METRONIDAZOLE 7.5 MG/G
1 GEL VAGINAL DAILY
Qty: 25 G | Refills: 0 | Status: SHIPPED | OUTPATIENT
Start: 2022-07-11 | End: 2023-02-28

## 2022-07-11 NOTE — PROGRESS NOTES
Assessment & Plan  31 year old  at 36w3d with BUCK Aug 5, 2022 based on LMP. High risk pregnancy due to history of  labor, financial difficulties. Having radha-reardon contractions but not in labor. Encouraged hydration. Having discharge, wet prep consistent with bacterial vaginosis, will treat with topical metronidazole as patient finds oral metronidazole hard to swallow.  Also measuring small for dates.  Will get growth ultrasound with fluid levels this week.  Recommended follow-up in 1 week.    Antonette was seen today for prenatal care.    Diagnoses and all orders for this visit:    Supervision of high risk pregnancy, antepartum  -     Group B strep PCR  -     Wet preparation  -     US OB > 14 Weeks; Future    Bacterial vaginitis  -     metroNIDAZOLE (METROGEL) 0.75 % vaginal gel; Place 1 applicator (5 g) vaginally daily    Nasal congestion    Bacterial vaginosis in pregnancy  -     metroNIDAZOLE (METROGEL) 0.75 % vaginal gel; Place 1 applicator (5 g) vaginally daily        Weight gain adequate: 13.2 kg (29 lb 3.2 oz) to date, out of recommended total of 25-35 lbs (pregravid BMI 18.5-24.9)    There are no Patient Instructions on file for this visit.    Return to clinic in 2 weeks.    Asuncion Garcia MD  I precepted today with Willy Chappell MD.            Subjective  Concerns: having lots of back pain. Ready to be done.     ROS:  No - Headache  No - Changes in vision  No - Chest Pain  No - Shortness of Breath  No - Nausea   No - Vomiting  No - Abdominal pain   YES pretty consistent radha reardon, painful, but does not feel like labor - Contractions  No - Dysuria   No - Vaginal Discharge    No - Vaginal bleeding   No - Loss of Fluid   No - Extremity swelling   Present - Fetal movement       Going to WIC? Yes    Patient Active Problem List   Diagnosis     Retroflexion of uterus     Migraine without aura     H/O gonorrhea     Supervision of high risk pregnancy, antepartum     Tetrahydrocannabinol (THC) use  disorder, mild, abuse     History of depression     Pregnancy complicated by previous  labor     Encounter for triage in pregnant patient     Anemia affecting pregnancy in second trimester       Antonette Horton speaks English so an  was not used today.    Guidance:    birth control  travel  warning signs/PIH    Do you need help getting a car seat? No  Do you need help getting a breast pump? No    Objective  /73 (BP Location: Left arm, Patient Position: Sitting, Cuff Size: Adult Regular)   Pulse 79   Temp 98  F (36.7  C) (Oral)   Resp 16   Wt 73.6 kg (162 lb 3.2 oz)   LMP 10/29/2021 (Exact Date)   SpO2 100%   BMI 26.99 kg/m    No distress.  Gravid abdomen.  .  Fundal height 31 cm.  no edema.    Results  Blood type: A POS  Results for orders placed or performed in visit on 22   Wet preparation     Status: Abnormal    Specimen: Vagina; Swab   Result Value Ref Range    Trichomonas Absent Absent    Yeast Absent Absent    Clue Cells Present (A) Absent    WBCs/high power field 3+ (A) None

## 2022-07-11 NOTE — Clinical Note
Hi [facility], Can you call Antonette and help her schedule an ultrasound over at [facility] this week?  I know My is out on Tuesday.  The ultrasound is to check fetal growth and fluid levels. Thank you!

## 2022-07-12 ENCOUNTER — TELEPHONE (OUTPATIENT)
Dept: FAMILY MEDICINE | Facility: CLINIC | Age: 32
End: 2022-07-12

## 2022-07-12 LAB — GP B STREP DNA SPEC QL NAA+PROBE: NEGATIVE

## 2022-07-12 NOTE — TELEPHONE ENCOUNTER
Called pt this morning to assist with scheduling referral US OB >14wks to check fetal growth and fluids @ [facility] per Dr. Garcia's request. Pt declined and stated she is on her way to [facility]. Has not been feeling well all night. Having some abdominal and back pain. Pt also states she will complete an US OB there and will have [employer] forward over records.     Adrienne Curtis

## 2022-07-13 ENCOUNTER — HOSPITAL ENCOUNTER (OUTPATIENT)
Dept: ULTRASOUND IMAGING | Facility: CLINIC | Age: 32
Discharge: HOME | End: 2022-07-13
Attending: FAMILY MEDICINE | Admitting: FAMILY MEDICINE
Payer: COMMERCIAL

## 2022-07-13 DIAGNOSIS — O09.90 SUPERVISION OF HIGH RISK PREGNANCY, ANTEPARTUM: ICD-10-CM

## 2022-07-13 PROCEDURE — 76816 OB US FOLLOW-UP PER FETUS: CPT

## 2022-07-15 ENCOUNTER — TELEPHONE (OUTPATIENT)
Dept: FAMILY MEDICINE | Facility: CLINIC | Age: 32
End: 2022-07-15

## 2022-07-15 NOTE — TELEPHONE ENCOUNTER
"Antonette states that she had an emergency  this morning at [place] due to being breech in active labor and no time to do version.  She states that she went to [place] because she was in too much pain to get to [facility].  She made an appt for her , Ka\"Tanya Odell to see Dr Garcia on Thursday, 22 at 3:10 PM and will brink paperwork for Dr Garcia to complete for her 12 week maternity leave.  Routed note to Dr Garcia./NG  "

## 2022-07-15 NOTE — TELEPHONE ENCOUNTER
[facility] phone call message- general phone call:    Reason for call: Pt stated that she would like My to call her back.    Return call needed: Yes    OK to leave a message on voice mail? Yes    Primary language: English      needed? No    Call taken on July 15, 2022 at 12:21 PM by Garth Bautista

## 2022-07-21 VITALS
DIASTOLIC BLOOD PRESSURE: 73 MMHG | RESPIRATION RATE: 16 BRPM | WEIGHT: 162.2 LBS | OXYGEN SATURATION: 100 % | TEMPERATURE: 98 F | HEART RATE: 79 BPM | BODY MASS INDEX: 26.99 KG/M2 | SYSTOLIC BLOOD PRESSURE: 112 MMHG

## 2022-07-21 PROBLEM — O09.90 SUPERVISION OF HIGH RISK PREGNANCY, ANTEPARTUM: Status: RESOLVED | Noted: 2022-01-20 | Resolved: 2022-07-21

## 2022-07-21 PROBLEM — O09.219 PREGNANCY COMPLICATED BY PREVIOUS PRETERM LABOR: Status: RESOLVED | Noted: 2022-01-20 | Resolved: 2022-07-21

## 2022-07-21 PROBLEM — O99.012 ANEMIA AFFECTING PREGNANCY IN SECOND TRIMESTER: Status: RESOLVED | Noted: 2022-05-03 | Resolved: 2022-07-21

## 2022-07-21 PROBLEM — Z36.89 ENCOUNTER FOR TRIAGE IN PREGNANT PATIENT: Status: RESOLVED | Noted: 2022-05-11 | Resolved: 2022-07-21

## 2022-07-22 ENCOUNTER — OFFICE VISIT (OUTPATIENT)
Dept: FAMILY MEDICINE | Facility: CLINIC | Age: 32
End: 2022-07-22
Payer: COMMERCIAL

## 2022-07-22 VITALS
DIASTOLIC BLOOD PRESSURE: 86 MMHG | BODY MASS INDEX: 24.1 KG/M2 | SYSTOLIC BLOOD PRESSURE: 120 MMHG | WEIGHT: 144.8 LBS | TEMPERATURE: 98.4 F | RESPIRATION RATE: 16 BRPM | OXYGEN SATURATION: 98 % | HEART RATE: 92 BPM

## 2022-07-22 PROCEDURE — 99213 OFFICE O/P EST LOW 20 MIN: CPT | Mod: 24 | Performed by: STUDENT IN AN ORGANIZED HEALTH CARE EDUCATION/TRAINING PROGRAM

## 2022-07-22 NOTE — PROGRESS NOTES
Assessment & Plan     Encounter for postpartum visit  Antonette is a 31-year-old  who delivered by  due to breech presentation at 37 weeks 3 days on 2022.  Presenting today for follow-up after her .  Thought she was told to return to her PCP for  follow-up.  Doing well.  Pain and vaginal bleeding appropriate.  No concerns about mood.  Bandage removed, incision appears clean dry and intact.  Recommended follow-up with OB in 5 weeks for 6-week postpartum visit.  Discussed incision care and avoiding submersion of water until at least 6 weeks postpartum.  Recommended follow-up with PCP for any further questions or concerns.    Diagnosis or treatment significantly limited by social determinants of health - Low health literacy, limited income    Patient was discussed with attending physician, Dr. Jose Antonio Hong MD, who agrees with the assessment and plan.    Asuncion Garcia MD, PGY-3  [facility]  2022      Subjective   Antonette Horton is a 31 year old female who presents for the following health issues     Chief Complaint   Patient presents with     Follow Up     Pt is here to follow up from the .  Pt states she has not taken off the bandage yet because she is scared.      Doing well. Taking Tylenol, ibuprofen, prenatal, stool softener. Take 1/2 tab of dilaudid when needed, just once today. Starting to feel a lot better. Minimal bleeding. Cramping ok.  Nervous about her incision, unsure if we should take it off today or not.      Objective    Vitals:    22 1535   BP: 120/86   BP Location: Left arm   Patient Position: Sitting   Cuff Size: Adult Regular   Pulse: 92   Resp: 16   Temp: 98.4  F (36.9  C)   TempSrc: Oral   SpO2: 98%   Weight: 65.7 kg (144 lb 12.8 oz)     Body mass index is 24.1 kg/m .  Physical Exam   General: alert, appears comfortable, no acute distress  HEENT: atraumatic, conjunctiva clear without erythema, EOM's intact, no nasal  discharge, MMM  Neck: supple  Cardiac: normal rate, appears well-perfused  Resp:  breathing comfortably on room air, no increased work of breathing  Abdomen: soft, non-tender to palpation, no masses.  Uterus palpable approximately 4 cm below umbilicus.  Bandage over incision appeared dry without drainage.  Bandage removed.  Incision clean dry and intact with Steri-Strips on top.  Extremities: no peripheral edema  Skin: no rashes or suspicious legions on exposed skin  Neuro: CN's grossly intact  Psych: affect congruent with mood

## 2022-08-08 ENCOUNTER — OFFICE VISIT (OUTPATIENT)
Dept: FAMILY MEDICINE | Facility: CLINIC | Age: 32
End: 2022-08-08
Payer: COMMERCIAL

## 2022-08-08 VITALS
SYSTOLIC BLOOD PRESSURE: 146 MMHG | HEART RATE: 113 BPM | TEMPERATURE: 98.2 F | RESPIRATION RATE: 16 BRPM | DIASTOLIC BLOOD PRESSURE: 96 MMHG | OXYGEN SATURATION: 100 %

## 2022-08-08 DIAGNOSIS — Z30.017 INSERTION OF IMPLANTABLE SUBDERMAL CONTRACEPTIVE: Primary | ICD-10-CM

## 2022-08-08 PROCEDURE — 11981 INSERTION DRUG DLVR IMPLANT: CPT | Mod: GC | Performed by: STUDENT IN AN ORGANIZED HEALTH CARE EDUCATION/TRAINING PROGRAM

## 2022-08-08 NOTE — PROGRESS NOTES
Preceptor Attestation:  I discussed the patient with the resident and evaluated the patient in person.   I was present for and supervised the entire procedure. I have verified the content of the note, which accurately reflects my assessment of the patient and the plan of care.   Supervising Physician:  Joanna Monroe MD.

## 2022-08-08 NOTE — PROGRESS NOTES
Procedure Note-Nexplanon Insertion    Antonette Horton is a patient of Asuncion Burrows here for placement of etonogestrel implant (Nexplanon)    Indication:Contraception    Consent: Affirmation of informed consent was signed and scanned into the medical record. Risks, benefits and alternatives were discussed. Patient's questions were elicited and answered.   Procedure safety checklist was completed:  Yes  Time Out (Pause for the Cause) completed: Yes    Labs: UPT:  Not done- 3 weeks postpartum, has not had intercourse   LMP:   No LMP recorded.       Previous Contraception:  none  Counseling:  Pt. counseled on potential side effects of  Nexplanon, including unpredictable spotting/bleeding and plan for removal/replacement of Nexplanon in 3 years or less.    Preoperative Diagnosis: Desires effective contraception  Postoperative Diagnosis: etonogestrel implant in place  Skin prep Betadine  Anesthesia 1% lidocaine, with epi    Technique:   Patient was placed supine with right arm exposed.  Medhat was made 8-10cm above medial epicondial and a guiding medhat 4 cm above the first.  Arm was prepped with betadine.Insertion point was anesthetized with 1% lidocaine 2mL. After stretching the skin with thumb and index finger around the insertion site.  Skin punctured with the tip of the needle inserted at 30 degrees and then lowered to horizontal position. While lifting the skin with the tip of the needle, slided the needle to it's full length. Applicator was then stabilized and the purple slider was unlocked by pushing it slightly down. Slider moved back completely until it stopped. Applicator was then removed.  Correct placement of the implant was confirmed by palpation in the patient's arm and visualizing the purple top of the obturator.  Insertion site was dressed with an adhesive covered by a pressure dressing.      User card and patient chart label filled out. User card  given to patient for record. Nexplanon added to  medication list     Lot# [[id number] ]    EBL: minimal  Complications:  No  Tolerance:  Pt tolerated procedure well and was in stable condition.         Follow up: Pt was instructed to call if bleeding, severe pain or foul smell.     Follow up in 2-4 weeks.  Resident: Asuncion Garcia MD  Faculty: Elzbieta Monroe MD present for and supervised this entire procedure.

## 2022-08-24 ENCOUNTER — OFFICE VISIT (OUTPATIENT)
Dept: FAMILY MEDICINE | Facility: CLINIC | Age: 32
End: 2022-08-24
Payer: COMMERCIAL

## 2022-08-24 ENCOUNTER — TELEPHONE (OUTPATIENT)
Dept: FAMILY MEDICINE | Facility: CLINIC | Age: 32
End: 2022-08-24

## 2022-08-24 VITALS
SYSTOLIC BLOOD PRESSURE: 117 MMHG | BODY MASS INDEX: 23.36 KG/M2 | WEIGHT: 140.4 LBS | TEMPERATURE: 98.7 F | DIASTOLIC BLOOD PRESSURE: 79 MMHG | OXYGEN SATURATION: 99 % | RESPIRATION RATE: 12 BRPM | HEART RATE: 82 BPM

## 2022-08-24 DIAGNOSIS — Z30.013 ENCOUNTER FOR INITIAL PRESCRIPTION OF INJECTABLE CONTRACEPTIVE: ICD-10-CM

## 2022-08-24 DIAGNOSIS — Z02.89 ENCOUNTER FOR COMPLETION OF FORM WITH PATIENT: ICD-10-CM

## 2022-08-24 DIAGNOSIS — Z30.46 SURVEILLANCE OF PREVIOUSLY PRESCRIBED IMPLANTABLE SUBDERMAL CONTRACEPTIVE: Primary | ICD-10-CM

## 2022-08-24 PROCEDURE — 99213 OFFICE O/P EST LOW 20 MIN: CPT | Mod: 25 | Performed by: STUDENT IN AN ORGANIZED HEALTH CARE EDUCATION/TRAINING PROGRAM

## 2022-08-24 PROCEDURE — 96372 THER/PROPH/DIAG INJ SC/IM: CPT | Performed by: STUDENT IN AN ORGANIZED HEALTH CARE EDUCATION/TRAINING PROGRAM

## 2022-08-24 PROCEDURE — 11982 REMOVE DRUG IMPLANT DEVICE: CPT | Mod: GC | Performed by: STUDENT IN AN ORGANIZED HEALTH CARE EDUCATION/TRAINING PROGRAM

## 2022-08-24 RX ORDER — MEDROXYPROGESTERONE ACETATE 150 MG/ML
150 INJECTION, SUSPENSION INTRAMUSCULAR
Status: DISCONTINUED | OUTPATIENT
Start: 2022-08-24 | End: 2023-02-28

## 2022-08-24 RX ADMIN — MEDROXYPROGESTERONE ACETATE 150 MG: 150 INJECTION, SUSPENSION INTRAMUSCULAR at 14:33

## 2022-08-24 ASSESSMENT — PATIENT HEALTH QUESTIONNAIRE - PHQ9: SUM OF ALL RESPONSES TO PHQ QUESTIONS 1-9: 16

## 2022-08-24 NOTE — PROGRESS NOTES
Assessment & Plan     Surveillance of previously prescribed implantable subdermal contraceptive  nexplanon removed, counseled her that depo-provera injection could also have similar side effect. She expressed understanding of this, and still desired to have Nexplanon removed. Also discussed possibility that this could also be related to postpartum bleeding; however, may be less likely as she has had resolution of bleeding before Nexplanon insertion.  - REMOVAL NEXPLANON    Encounter for initial prescription of injectable contraceptive  Depo administered today.  - medroxyPROGESTERone (DEPO-PROVERA) injection 150 mg    Encounter for completion of form with patient  Filled out form for  animals, and encouraged patient to follow up with her psychiatrist.       Review of prior external note(s) from - previous office visit  80 minutes spent on the date of the encounter doing chart review, history and exam, documentation and further activities per the note       Depression Screening Follow Up    PHQ 8/24/2022   PHQ-9 Total Score 16   Q9: Thoughts of better off dead/self-harm past 2 weeks Not at all     Last PHQ-9 8/24/2022   1.  Little interest or pleasure in doing things 1   2.  Feeling down, depressed, or hopeless 3   3.  Trouble falling or staying asleep, or sleeping too much 3   4.  Feeling tired or having little energy 3   5.  Poor appetite or overeating 3   6.  Feeling bad about yourself 0   7.  Trouble concentrating 3   8.  Moving slowly or restless 0   Q9: Thoughts of better off dead/self-harm past 2 weeks 0   PHQ-9 Total Score 16   Difficulty at work, home, or with people Not difficult at all       Follow Up Actions Taken  Crisis resource information provided in After Visit Summary  Referred patient back to current mental health provider.     Pinky Tsai MD PGY3  [facility]  Precepted with Dr. Aletha Redmic Horton is a 32 year old who presents for the  following health issues     HPI     Presented to discuss heavy menses. She mentions recently delivering and had resolution of bleeding, but noted after having nexplanon inserted, has resumption of bleeding, which has been heavy. She has had bleeding for 1.5weeks and is going through 1-2pads/hour. Denies any syncope, dizziness, lightheadness, passing of clots. She believes this is related to the nexplanon and desires it to be removed. She wants depo-provera injection again. She says that she was on this for 8 years previously.     Moreover, she presents with forms to be filled out for  animals. She has a previous diagnosis of depression and due to pregnancy, came off of her zoloft. She says that she is working on getting back in with her psychiatrist to discuss her depression and medications further.     Procedure Note-Nexplanon Removal    Antonette Horton is a patient of Asuncion Burrows here for removal of etonogestrel implant Nexplanon/Implanon    Indication: heavy menses    Consent: Affirmation of informed consent was signed and scanned into the medical record. Risks, benefits and alternatives were discussed. Patient's questions were elicited and answered.   Procedure safety checklist was completed:  Yes  Time Out (Pause for the Cause) completed: Yes      Preoperative Diagnosis: etonogestrel implant  Postoperative Diagnosis: etonogestrel implant removed    Technique:   Skin prep Betadine  Anesthesia 1% lidocaine, with epi  Procedure: Small incision (<5mm) was made at distal end of palpable implant, curved hemostat was used to isolate the implant and bring it to the incision, the fibrous capsule containing the implant  was incised and the Implant was removed intact.  EBL: minimal  Complications:  No  Tolerance:  Pt tolerated procedure well and was in stable condition.     Contraception was discussed and patient chose the following method depoprovera      Follow up: Pt was instructed to call if bleeding,  severe pain or foul smell.     Follow up in 2-4 weeks.      Resident: Pinky Tsai MD PGY3  Faculty: Dr. Aletha MD present for and supervised this entire procedure.    Initial Depo Injection     Is the patient within 1st 5 days of a normal period?   Yes: currently experiencing heavy menses  Is the patient post delivery ?      Yes  If yes, is she breastfeeding?  No  If yes breastfeeding, shot given within 6 weeks after delivery?    N/A.  If no breastfeeding, shot given within 5 days of delivery?  No    BP Readings from Last 1 Encounters:   08/24/22 117/79     No LMP recorded.    Date range to return is given to patient for her next dose: 3 months     See Medication Note for administration information    Staff Sig: Pinky Tsai MD PGY3      Review of Systems   Complete ROS normal aside noted in HPI      Objective    /79 (BP Location: Left arm, Cuff Size: Adult Regular)   Pulse 82   Temp 98.7  F (37.1  C) (Oral)   Resp 12   Wt 63.7 kg (140 lb 6.4 oz)   SpO2 99%   BMI 23.36 kg/m    Body mass index is 23.36 kg/m .    Physical Exam   GENERAL: healthy, alert and no distress  MS: no gross musculoskeletal defects noted, no edema  PSYCH: mentation appears normal, affect normal/bright      ----- Service Performed and Documented by Resident or Fellow ------

## 2022-08-24 NOTE — NURSING NOTE
Clinic Administered Medication Documentation    Administrations This Visit     medroxyPROGESTERone (DEPO-PROVERA) injection 150 mg     Admin Date  08/24/2022 Action  Given Dose  150 mg Route  Intramuscular Site  Left Ventrogluteal Administered By  Osiel Jin CMA    Ordering Provider: Pinky Tsai MD    Patient Supplied?: No    Comments: Reminder card given to pt. Next depo due 11/10/22-11/24/22                  Depo Provera Documentation    URINE HCG: not indicated    Depo-Provera Standing Order inclusion/exclusion criteria reviewed.   Patient meets: inclusion criteria     BP: 117/79  LAST PAP/EXAM: No results found for: PAP    Prior to injection, verified patient identity using patient's name and date of birth. Medication was administered. Please see MAR and medication order for additional information.     Was entire vial of medication used? Yes  Vial/Syringe: Single dose vial  Expiration Date:  1/2024    Patient instructed to remain in clinic for 15 minutes and report any adverse reaction to staff immediately .  NEXT INJECTION DUE: 11/10/22 - 11/24/22      Name of provider who requested the medication administration: Dr. Tsai  Name of provider on site (faculty or community preceptor) at the time of performing the medication administration: Dr. Pompa    Date of next administration: 11/10/22-11/24/22  Date of next office visit with provider to renew medication plan (must be seen annually): 8/24/23

## 2022-08-24 NOTE — TELEPHONE ENCOUNTER
Pt reports that she has had heavy menstruation for the last 8 days.  She states that she is filling x-large pad every 2 hours.  Pt would like to have Nexplanon removed and switch to Depo.  She states that she needs a letter from the doctor to give to her  to allow her to have her 2 cats as  animals.  Pt does have depression and stopped her medication during the pregnancy but is planning to schedule an appt with her psychiatrist to restart medication.  Routed note to Dr Tsai./AR

## 2022-08-24 NOTE — PROGRESS NOTES
Preceptor Attestation:    I discussed the patient with the resident and evaluated the patient in person. I was present for and supervised the entire procedure. I have verified the content of the note, which accurately reflects my assessment of the patient and the plan of care.   Supervising Physician:  Ciro Pompa MD.

## 2022-09-21 ENCOUNTER — OFFICE VISIT (OUTPATIENT)
Dept: FAMILY MEDICINE | Facility: CLINIC | Age: 32
End: 2022-09-21
Payer: COMMERCIAL

## 2022-09-21 VITALS
HEART RATE: 107 BPM | BODY MASS INDEX: 22.5 KG/M2 | SYSTOLIC BLOOD PRESSURE: 111 MMHG | TEMPERATURE: 98.3 F | OXYGEN SATURATION: 99 % | DIASTOLIC BLOOD PRESSURE: 74 MMHG | WEIGHT: 135.2 LBS | RESPIRATION RATE: 16 BRPM

## 2022-09-21 DIAGNOSIS — N76.0 BACTERIAL VAGINOSIS: ICD-10-CM

## 2022-09-21 DIAGNOSIS — N92.0 SPOTTING: ICD-10-CM

## 2022-09-21 DIAGNOSIS — R30.0 DYSURIA: ICD-10-CM

## 2022-09-21 DIAGNOSIS — N89.8 VAGINAL DISCHARGE: Primary | ICD-10-CM

## 2022-09-21 DIAGNOSIS — R10.819 SUPRAPUBIC TENDERNESS: ICD-10-CM

## 2022-09-21 DIAGNOSIS — B96.89 BACTERIAL VAGINOSIS: ICD-10-CM

## 2022-09-21 LAB
ALBUMIN UR-MCNC: ABNORMAL MG/DL
APPEARANCE UR: CLEAR
BACTERIA #/AREA URNS HPF: ABNORMAL /HPF
BILIRUB UR QL STRIP: ABNORMAL
CLUE CELLS: PRESENT
COLOR UR AUTO: YELLOW
GLUCOSE UR STRIP-MCNC: NEGATIVE MG/DL
HGB UR QL STRIP: ABNORMAL
KETONES UR STRIP-MCNC: NEGATIVE MG/DL
LEUKOCYTE ESTERASE UR QL STRIP: NEGATIVE
MUCOUS THREADS #/AREA URNS LPF: PRESENT /LPF
NITRATE UR QL: NEGATIVE
PH UR STRIP: 6 [PH] (ref 5–8)
RBC #/AREA URNS AUTO: ABNORMAL /HPF
SP GR UR STRIP: 1.02 (ref 1–1.03)
SQUAMOUS #/AREA URNS AUTO: ABNORMAL /LPF
TRICHOMONAS, WET PREP: ABNORMAL
UROBILINOGEN UR STRIP-ACNC: 0.2 E.U./DL
WBC #/AREA URNS AUTO: ABNORMAL /HPF
WBC'S/HIGH POWER FIELD, WET PREP: ABNORMAL
YEAST, WET PREP: ABNORMAL

## 2022-09-21 PROCEDURE — 81001 URINALYSIS AUTO W/SCOPE: CPT | Performed by: STUDENT IN AN ORGANIZED HEALTH CARE EDUCATION/TRAINING PROGRAM

## 2022-09-21 PROCEDURE — 99214 OFFICE O/P EST MOD 30 MIN: CPT | Performed by: STUDENT IN AN ORGANIZED HEALTH CARE EDUCATION/TRAINING PROGRAM

## 2022-09-21 PROCEDURE — 87210 SMEAR WET MOUNT SALINE/INK: CPT | Performed by: STUDENT IN AN ORGANIZED HEALTH CARE EDUCATION/TRAINING PROGRAM

## 2022-09-21 RX ORDER — METRONIDAZOLE 7.5 MG/G
1 GEL VAGINAL DAILY
Qty: 25 G | Refills: 0 | Status: SHIPPED | OUTPATIENT
Start: 2022-09-21 | End: 2022-09-26

## 2022-09-21 RX ORDER — NAPROXEN 500 MG/1
500 TABLET ORAL 2 TIMES DAILY WITH MEALS
Qty: 20 TABLET | Refills: 1 | Status: SHIPPED | OUTPATIENT
Start: 2022-09-21 | End: 2022-09-26

## 2022-09-21 RX ORDER — METRONIDAZOLE 500 MG/1
500 TABLET ORAL 2 TIMES DAILY
Qty: 14 TABLET | Refills: 0 | Status: CANCELLED | OUTPATIENT
Start: 2022-09-21 | End: 2022-09-28

## 2022-09-21 NOTE — PATIENT INSTRUCTIONS
For your spotting: Take naproxen 500mg twice daily with meals for 5 days. It helps to stabilize the lining of the uterus and help with bleeding.  If it works but spotting comes back, you can repeat the course. If it does not and you want to try a different form of birth control, the Nuva Ring or birth control patch (also called Xulane) might be a good option.  Nuva Ring is like the pill, but a ring inserted in the vagina once per month.

## 2022-09-21 NOTE — PROGRESS NOTES
Chief Complaint   Patient presents with     UTI     Think have bladder infection and also got spotting with the birth control per patient.      Medication Reconciliation     Reviewed.       Vaginal Problem     Also have discharge and want to check for bacteria infection per patient.        There are no exam notes on file for this visit.        Assessment/Plan:  Antonette Horton is a 32 year old female here for suprapubic tenderness, spotting, and urinating small amounts for the past 3-4 days, without dysuria or frequency.  Urinalysis appears c/w spotting but no clear evidence for infection. Wet prep with clue cells c/w bacterial vaginosis. Discussed oral vs PV treatment and elects PV metronidazole gel which was sent to pharmacy.    For spotting discussed this is normal in first 3 mos of depo provera. Trial naproxen 500mg BID AC x5 days, OK to repeat course if successful. Discussed option of use of Nuvaring or xulane patch if desires OCP-type medication that does not require daily dosing.     Antonette was seen today for uti, medication reconciliation and vaginal problem.    Diagnoses and all orders for this visit:    Vaginal discharge  -     Wet preparation    Dysuria  -     Urinalysis, Micro If  -     Urine Microscopic Exam    Spotting  -     naproxen (NAPROSYN) 500 MG tablet; Take 1 tablet (500 mg) by mouth 2 times daily (with meals) for 5 days    Suprapubic tenderness  -     Cancel: NEISSERIA GONORRHOEA PCR  -     Cancel: CHLAMYDIA TRACHOMATIS PCR    Bacterial vaginosis  -     metroNIDAZOLE (METROGEL) 0.75 % vaginal gel; Place 1 applicator (5 g) vaginally daily for 5 days        Follow-up as currently scheduled for Pap and STI screening.     Future Appointments   Date Time Provider Department Center   9/30/2022  9:20 AM Rossy Hinton MD [facility]       Cathleen Selby MD      Patient Instructions   For your spotting: Take naproxen 500mg twice daily with meals for 5 days. It helps to stabilize the lining of the  uterus and help with bleeding.  If it works but spotting comes back, you can repeat the course. If it does not and you want to try a different form of birth control, the Nuva Ring or birth control patch (also called Xulane) might be a good option.  Nuva Ring is like the pill, but a ring inserted in the vagina once per month.          Subjective:  Antonette Horton is a 32 year old female here for possible UTI.   She has no burning with urination.  Has some suprapubic pain.   Just small amount of urine coming out when she goes, but denies frequency.   Has a 2 month old baby -     No vaginal discharge, just spotting.   No irritation.  No discomfort with sex. She just started having the discomfort.   Has had one UTI before, worried this is the same.    Patient Active Problem List   Diagnosis     Migraine without aura     H/O gonorrhea     Tetrahydrocannabinol (THC) use disorder, mild, abuse     History of depression       Current Outpatient Medications   Medication     metroNIDAZOLE (METROGEL) 0.75 % vaginal gel     naproxen (NAPROSYN) 500 MG tablet     acetaminophen (TYLENOL) 325 MG tablet     cetirizine (ZYRTEC) 10 MG tablet     doxylamine (UNISOM) 25 MG TABS tablet     ferrous sulfate (FEROSUL) 325 (65 Fe) MG tablet     guaiFENesin (MUCINEX) 600 MG 12 hr tablet     melatonin 3 MG tablet     metroNIDAZOLE (METROGEL) 0.75 % vaginal gel     polyethylene glycol (MIRALAX) 17 GM/Dose powder     Prenatal Vit-Fe Fumarate-FA (PRENATAL MULTIVITAMIN W/IRON) 27-0.8 MG tablet     pyridOXINE (VITAMIN B6) 25 MG tablet     Current Facility-Administered Medications   Medication     medroxyPROGESTERone (DEPO-PROVERA) injection 150 mg       Objective:  /74 (BP Location: Right arm, Patient Position: Sitting, Cuff Size: Adult Regular)   Pulse 107   Temp 98.3  F (36.8  C) (Oral)   Resp 16   Wt 61.3 kg (135 lb 3.2 oz)   SpO2 99%   BMI 22.50 kg/m    Body mass index is 22.5 kg/m .  Gen: A/O x3, in NAD.  Abd: Soft, NT/ND, no rebound  or guarding.    Ext: No edema of BLE. Cap refill <2 seconds.  Neuro: Grossly intact.

## 2022-09-30 ENCOUNTER — OFFICE VISIT (OUTPATIENT)
Dept: FAMILY MEDICINE | Facility: CLINIC | Age: 32
End: 2022-09-30
Payer: COMMERCIAL

## 2022-09-30 VITALS
OXYGEN SATURATION: 96 % | RESPIRATION RATE: 16 BRPM | HEIGHT: 65 IN | DIASTOLIC BLOOD PRESSURE: 74 MMHG | HEART RATE: 81 BPM | WEIGHT: 138.2 LBS | SYSTOLIC BLOOD PRESSURE: 109 MMHG | TEMPERATURE: 98.2 F | BODY MASS INDEX: 23.03 KG/M2

## 2022-09-30 DIAGNOSIS — Z00.00 ROUTINE GENERAL MEDICAL EXAMINATION AT A HEALTH CARE FACILITY: Primary | ICD-10-CM

## 2022-09-30 LAB
ALBUMIN UR-MCNC: NEGATIVE MG/DL
APPEARANCE UR: CLEAR
BACTERIA #/AREA URNS HPF: ABNORMAL /HPF
BILIRUB UR QL STRIP: NEGATIVE
COLOR UR AUTO: YELLOW
ERYTHROCYTE [DISTWIDTH] IN BLOOD BY AUTOMATED COUNT: 13.8 % (ref 10–15)
GLUCOSE UR STRIP-MCNC: NEGATIVE MG/DL
HCT VFR BLD AUTO: 37.8 % (ref 35–47)
HGB BLD-MCNC: 11.6 G/DL (ref 11.7–15.7)
HGB UR QL STRIP: ABNORMAL
KETONES UR STRIP-MCNC: NEGATIVE MG/DL
LEUKOCYTE ESTERASE UR QL STRIP: NEGATIVE
MCH RBC QN AUTO: 22.5 PG (ref 26.5–33)
MCHC RBC AUTO-ENTMCNC: 30.7 G/DL (ref 31.5–36.5)
MCV RBC AUTO: 73 FL (ref 78–100)
NITRATE UR QL: NEGATIVE
PH UR STRIP: 6 [PH] (ref 5–8)
PLATELET # BLD AUTO: 329 10E3/UL (ref 150–450)
RBC # BLD AUTO: 5.15 10E6/UL (ref 3.8–5.2)
RBC #/AREA URNS AUTO: ABNORMAL /HPF
SP GR UR STRIP: 1.02 (ref 1–1.03)
SQUAMOUS #/AREA URNS AUTO: ABNORMAL /LPF
UROBILINOGEN UR STRIP-ACNC: 0.2 E.U./DL
WBC # BLD AUTO: 5.5 10E3/UL (ref 4–11)
WBC #/AREA URNS AUTO: ABNORMAL /HPF

## 2022-09-30 PROCEDURE — 36415 COLL VENOUS BLD VENIPUNCTURE: CPT | Performed by: STUDENT IN AN ORGANIZED HEALTH CARE EDUCATION/TRAINING PROGRAM

## 2022-09-30 PROCEDURE — 87389 HIV-1 AG W/HIV-1&-2 AB AG IA: CPT | Performed by: STUDENT IN AN ORGANIZED HEALTH CARE EDUCATION/TRAINING PROGRAM

## 2022-09-30 PROCEDURE — 85027 COMPLETE CBC AUTOMATED: CPT | Performed by: STUDENT IN AN ORGANIZED HEALTH CARE EDUCATION/TRAINING PROGRAM

## 2022-09-30 PROCEDURE — 87591 N.GONORRHOEAE DNA AMP PROB: CPT | Performed by: STUDENT IN AN ORGANIZED HEALTH CARE EDUCATION/TRAINING PROGRAM

## 2022-09-30 PROCEDURE — 86780 TREPONEMA PALLIDUM: CPT | Performed by: STUDENT IN AN ORGANIZED HEALTH CARE EDUCATION/TRAINING PROGRAM

## 2022-09-30 PROCEDURE — 81001 URINALYSIS AUTO W/SCOPE: CPT | Performed by: STUDENT IN AN ORGANIZED HEALTH CARE EDUCATION/TRAINING PROGRAM

## 2022-09-30 PROCEDURE — 87491 CHLMYD TRACH DNA AMP PROBE: CPT | Performed by: STUDENT IN AN ORGANIZED HEALTH CARE EDUCATION/TRAINING PROGRAM

## 2022-09-30 PROCEDURE — 99395 PREV VISIT EST AGE 18-39: CPT | Mod: GC | Performed by: STUDENT IN AN ORGANIZED HEALTH CARE EDUCATION/TRAINING PROGRAM

## 2022-09-30 ASSESSMENT — ENCOUNTER SYMPTOMS
BREAST MASS: 0
NAUSEA: 1

## 2022-09-30 ASSESSMENT — PATIENT HEALTH QUESTIONNAIRE - PHQ9
10. IF YOU CHECKED OFF ANY PROBLEMS, HOW DIFFICULT HAVE THESE PROBLEMS MADE IT FOR YOU TO DO YOUR WORK, TAKE CARE OF THINGS AT HOME, OR GET ALONG WITH OTHER PEOPLE: SOMEWHAT DIFFICULT
SUM OF ALL RESPONSES TO PHQ QUESTIONS 1-9: 9
SUM OF ALL RESPONSES TO PHQ QUESTIONS 1-9: 9

## 2022-09-30 NOTE — PROGRESS NOTES
"Preceptor Attestation:  Vitals:    09/30/22 0933   BP: 109/74   Pulse: 81   Resp: 16   Temp: 98.2  F (36.8  C)   TempSrc: Oral   SpO2: 96%   Weight: 62.7 kg (138 lb 3.2 oz)   Height: 1.647 m (5' 4.84\")          I discussed the patient with the resident and evaluated the patient in person. I have verified the content of the note, which accurately reflects my assessment of the patient and the plan of care.   Supervising Physician:  Cathleen Selby MD    "

## 2022-09-30 NOTE — PROGRESS NOTES
Answers for HPI/ROS submitted by the patient on 9/30/2022  If you checked off any problems, how difficult have these problems made it for you to do your work, take care of things at home, or get along with other people?: Somewhat difficult  PHQ9 TOTAL SCORE: 9  Frequency of exercise:: 4-5 days/week  Getting at least 3 servings of Calcium per day:: Yes  Diet:: Regular (no restrictions)  Taking medications regularly:: Yes  Bi-annual eye exam:: NO  Dental care twice a year:: Yes  Sleep apnea or symptoms of sleep apnea:: None  nausea: Yes  pelvic pain: Yes  vaginal bleeding: Yes  vaginal discharge: No  tenderness: No  breast mass: No  breast discharge: No  Additional concerns today:: Yes  Duration of exercise:: 15-30 minutes       SUBJECTIVE:   CC: Antonette is an 32 year old who presents for preventive health visit. Patient states that she completed her metronidazole for BV a few days ago. She has not taken naproxen for spotting while on Depo. She would like to be tested for STIs today. Patient wants to confirm that her infection has cleared. Discussed with patient that would reevaluate if she still having symptoms.      Healthy Habits:     Getting at least 3 servings of Calcium per day:  Yes    Bi-annual eye exam:  NO    Dental care twice a year:  Yes    Sleep apnea or symptoms of sleep apnea:  None    Diet:  Regular (no restrictions)    Frequency of exercise:  4-5 days/week    Duration of exercise:  15-30 minutes    Taking medications regularly:  Yes    PHQ-2 Total Score: 4    Additional concerns today:  Yes    Today's PHQ-2 Score:   PHQ-2 ( 1999 Pfizer) 9/30/2022   Q1: Little interest or pleasure in doing things 3   Q2: Feeling down, depressed or hopeless 3   PHQ-2 Score 6   PHQ-2 Total Score (12-17 Years)- Positive if 3 or more points; Administer PHQ-A if positive -   Q1: Little interest or pleasure in doing things Several days   Q2: Feeling down, depressed or hopeless Nearly every day   PHQ-2 Score 4       Abuse:  Current or Past (Physical, Sexual or Emotional) - No  Do you feel safe in your environment? Yes    Have you ever done Advance Care Planning? (For example, a Health Directive, POLST, or a discussion with a medical provider or your loved ones about your wishes): No, advance care planning information given to patient to review today.     Social History     Tobacco Use     Smoking status: Former Smoker     Types: Cigarettes     Quit date:      Years since quittin.7     Smokeless tobacco: Never Used     Tobacco comment: vape   Substance Use Topics     Alcohol use: Not Currently     If you drink alcohol do you typically have >3 drinks per day or >7 drinks per week? No    Alcohol Use 2022   Prescreen: >3 drinks/day or >7 drinks/week? No   Prescreen: >3 drinks/day or >7 drinks/week? -   No flowsheet data found.    FHS-7: No flowsheet data found.  click delete button to remove this line now  Patient under 40 years of age: Routine Mammogram Screening not recommended.   Pertinent mammograms are reviewed under the imaging tab.    History of abnormal Pap smear: NO - age 30- 65 PAP every 3 years recommended  PAP / HPV Latest Ref Rng & Units 2021   PAP   Negative for Intraepithelial Lesion or Malignancy (NILM) Negative for squamous intraepithelial lesion or malignancy  Electronically signed by Maryellen Hanna CT (ASCP) on 2015 at  8:07 AM     HPV16 Negative Negative -   HPV18 Negative Negative -   HRHPV Negative Negative -       Review of Systems  CONSTITUTIONAL: NEGATIVE for fever, chills, change in weight  INTEGUMENTARU/SKIN: NEGATIVE for worrisome rashes, moles or lesions  EYES: NEGATIVE for vision changes or irritation  ENT: NEGATIVE for ear, mouth and throat problems  RESP: NEGATIVE for significant cough or SOB  BREAST: NEGATIVE for masses, tenderness or discharge  CV: NEGATIVE for chest pain, palpitations or peripheral edema  GI: NEGATIVE for nausea, abdominal pain, heartburn, or change in  "bowel habits  : NEGATIVE for unusual urinary. Positive for cramping and vaginal spotting.   MUSCULOSKELETAL: NEGATIVE for significant arthralgias or myalgia  NEURO: NEGATIVE for weakness, dizziness or paresthesias  PSYCHIATRIC: NEGATIVE for changes in mood or affect     OBJECTIVE:   /74   Pulse 81   Temp 98.2  F (36.8  C) (Oral)   Resp 16   Ht 1.647 m (5' 4.84\")   Wt 62.7 kg (138 lb 3.2 oz)   SpO2 96%   BMI 23.11 kg/m    Physical Exam  GENERAL: healthy, alert and no distress  EYES: Eyes grossly normal to inspection, PERRL and conjunctivae and sclerae normal  HENT: ear canals and TM's normal, nose and mouth without ulcers or lesions  NECK: no adenopathy, no asymmetry, masses, or scars and thyroid normal to palpation  RESP: lungs clear to auscultation - no rales, rhonchi or wheezes  BREAST: normal without masses, tenderness or nipple discharge and no palpable axillary masses or adenopathy  CV: regular rate and rhythm, normal S1 S2, no S3 or S4, no murmur, click or rub, no peripheral edema and peripheral pulses strong  ABDOMEN: soft, nontender, no hepatosplenomegaly, no masses and bowel sounds normal  MS: no gross musculoskeletal defects noted, no edema  SKIN: no suspicious lesions or rashes  NEURO: Normal strength and tone, mentation intact and speech normal  PSYCH: mentation appears normal, affect normal/bright    Diagnostic Test Results:  Labs reviewed in Epic  none     ASSESSMENT/PLAN:   Antonette Horton is a 31 y/o female with history of anxiety/depression, current marijuana use and vaping who presents for scheduled preventative visit.       Pap smear/HPV negative on 11/8/2021 next screening in 2024    STI screening: HIV, GC/Chlamydia, Syphilis negative     Patient declined Covid and Flu shots today       COUNSELING:  Reviewed preventive health counseling, as reflected in patient instructions       Nicotine/Tobacco cessation        Regular exercise       Healthy diet/nutrition       Contraception       " "Safe sex practices/STD prevention    Estimated body mass index is 23.11 kg/m  as calculated from the following:    Height as of this encounter: 1.647 m (5' 4.84\").    Weight as of this encounter: 62.7 kg (138 lb 3.2 oz).      Counseling Resources:  ATP IV Guidelines  Pooled Cohorts Equation Calculator  Breast Cancer Risk Calculator  BRCA-Related Cancer Risk Assessment: FHS-7 Tool  FRAX Risk Assessment  ICSI Preventive Guidelines  Dietary Guidelines for Americans, 2010  USDA's MyPlate  ASA Prophylaxis  Lung CA Screening    Rossy Hinton MD  [facility]  "

## 2022-10-01 LAB
C TRACH DNA SPEC QL NAA+PROBE: NEGATIVE
HIV 1+2 AB+HIV1 P24 AG SERPL QL IA: NONREACTIVE
N GONORRHOEA DNA SPEC QL NAA+PROBE: NEGATIVE
T PALLIDUM AB SER QL: NONREACTIVE

## 2022-10-03 ENCOUNTER — TELEPHONE (OUTPATIENT)
Dept: FAMILY MEDICINE | Facility: CLINIC | Age: 32
End: 2022-10-03

## 2022-10-03 NOTE — TELEPHONE ENCOUNTER
[facility] phone call message- patient requesting results:    Test: Lab    Date of test: 09302022    Additional Comments: NONE     OK to leave a message on voice mail? Yes    Primary language: English      needed? No    Call taken on October 3, 2022 at 10:24 AM by Miguel Gunter

## 2022-10-17 ENCOUNTER — DOCUMENTATION ONLY (OUTPATIENT)
Dept: FAMILY MEDICINE | Facility: CLINIC | Age: 32
End: 2022-10-17

## 2022-10-17 NOTE — PROGRESS NOTES
To be completed in Nursing note:    Please reference list for forms that require a visit for completion.  Please remind patients that providers are given 3-5 business days to complete and return forms.      Form type: EMOTIONAL SUPPORT ANIMAL VERIFICATION ~ REAL ESTATE EQUITIES     Date form received: 10/7/2022    Date form completed by Physician: 10/17/2022    How was form returned to patient (mailed, faxed, or at  for patient to ):    Fax to [phone] ATTN: Marilee Callahan    Date form mailed/faxed/left at  for patient and sent to HIM for scanning:    Fax on 10/17/2022    Once form is left for patient, faxed, or mailed PCS will then close the documentation only encounter.

## 2022-11-15 ENCOUNTER — ALLIED HEALTH/NURSE VISIT (OUTPATIENT)
Dept: FAMILY MEDICINE | Facility: CLINIC | Age: 32
End: 2022-11-15
Payer: COMMERCIAL

## 2022-11-15 VITALS
OXYGEN SATURATION: 97 % | DIASTOLIC BLOOD PRESSURE: 85 MMHG | SYSTOLIC BLOOD PRESSURE: 123 MMHG | TEMPERATURE: 99.8 F | HEART RATE: 89 BPM

## 2022-11-15 DIAGNOSIS — Z30.9 CONTRACEPTIVE MANAGEMENT: Primary | ICD-10-CM

## 2022-11-15 PROCEDURE — 96372 THER/PROPH/DIAG INJ SC/IM: CPT | Performed by: FAMILY MEDICINE

## 2022-11-15 RX ADMIN — MEDROXYPROGESTERONE ACETATE 150 MG: 150 INJECTION, SUSPENSION INTRAMUSCULAR at 15:52

## 2022-11-15 NOTE — NURSING NOTE
Clinic Administered Medication Documentation    Administrations This Visit     medroxyPROGESTERone (DEPO-PROVERA) injection 150 mg     Admin Date  11/15/2022 Action  Given Dose  150 mg Route  Intramuscular Site  Left Ventrogluteal Administered By  Osiel Jin CMA    Ordering Provider: Pinky Tsai MD    Patient Supplied?: No    Comments: Pt on time, depo given. Reminder card given to pt. Next depo due 2/1/23-2/15/23                  Depo Provera Documentation    URINE HCG: not indicated    Depo-Provera Standing Order inclusion/exclusion criteria reviewed.   Patient meets: inclusion criteria     BP: 123/85  LAST PAP/EXAM: No results found for: PAP    Prior to injection, verified patient identity using patient's name and date of birth. Medication was administered. Please see MAR and medication order for additional information.     Was entire vial of medication used? Yes  Vial/Syringe: Single dose vial  Expiration Date:  4/2024    Patient instructed to remain in clinic for 15 minutes and report any adverse reaction to staff immediately .  NEXT INJECTION DUE: 1/31/23 - 2/14/23      Name of provider who requested the medication administration: Dr. Garcia  Name of provider on site (faculty or community preceptor) at the time of performing the medication administration: Dr. Pompa    Date of next administration: 2/1/23-2/15/23  Date of next office visit with provider to renew medication plan (must be seen annually): 8/24/23

## 2022-12-01 ENCOUNTER — TELEPHONE (OUTPATIENT)
Dept: FAMILY MEDICINE | Facility: CLINIC | Age: 32
End: 2022-12-01

## 2022-12-01 ENCOUNTER — OFFICE VISIT (OUTPATIENT)
Dept: FAMILY MEDICINE | Facility: CLINIC | Age: 32
End: 2022-12-01
Payer: COMMERCIAL

## 2022-12-01 VITALS
TEMPERATURE: 99 F | WEIGHT: 135 LBS | HEIGHT: 65 IN | HEART RATE: 97 BPM | RESPIRATION RATE: 18 BRPM | BODY MASS INDEX: 22.49 KG/M2 | DIASTOLIC BLOOD PRESSURE: 83 MMHG | SYSTOLIC BLOOD PRESSURE: 137 MMHG | OXYGEN SATURATION: 98 %

## 2022-12-01 DIAGNOSIS — T78.40XA ALLERGIC REACTION, INITIAL ENCOUNTER: Primary | ICD-10-CM

## 2022-12-01 PROCEDURE — 99213 OFFICE O/P EST LOW 20 MIN: CPT | Mod: GC | Performed by: STUDENT IN AN ORGANIZED HEALTH CARE EDUCATION/TRAINING PROGRAM

## 2022-12-01 RX ORDER — CETIRIZINE HYDROCHLORIDE 10 MG/1
10 TABLET ORAL 2 TIMES DAILY PRN
Qty: 30 TABLET | Refills: 0 | Status: SHIPPED | OUTPATIENT
Start: 2022-12-01 | End: 2023-02-28

## 2022-12-01 RX ORDER — PREDNISONE 20 MG/1
20 TABLET ORAL DAILY
Qty: 5 TABLET | Refills: 0 | Status: SHIPPED | OUTPATIENT
Start: 2022-12-01 | End: 2022-12-06

## 2022-12-01 NOTE — PROGRESS NOTES
Assessment and Plan    Antonette was seen today for allergies reaction that is started after using a new body wash and hair gel.  She discontinue the body wash but still continue the hair gel.  Exam is positive for hives with some sign of excoriation.  Differential might include pregnancy cholestasis, patient is not pregnant.  Also, malignant.  Given the history of new body wash and air gel patient most likely has allergic dermatitis.    Diagnoses and all orders for this visit:    Allergic reaction, initial encounter  -     cetirizine (ZYRTEC) 10 MG tablet; Take 1 tablet (10 mg) by mouth 2 times daily as needed for allergies  -     predniSONE (DELTASONE) 20 MG tablet; Take 1 tablet (20 mg) by mouth daily for 5 days             Options for treatment and follow-up care were reviewed with the patient. Patient engaged in the decision making process and verbalized understanding of the options discussed and agreed with the final plan.    Patient was staffed with attending physician MD Cm Taveras MD PGY3           HPI       Antonette Horton is a 32 year old year old female w/ PMH of   Patient Active Problem List   Diagnosis     Migraine without aura     H/O gonorrhea     Tetrahydrocannabinol (THC) use disorder, mild, abuse     History of depression    who presents for generalized itching with hives for 3 days.  Patient's symptoms started after having new body wash.  Patient tried Claritin and Benadryl, initially with good help then no help.  Tried hydrocortisone without help.  Patient denies using any other products except for new hair gel.  Denies fever, joint pain, muscle ache, or been sick recently.        Problem, Medication and Allergy Lists were   reviewed and updated if needed.     Patient Active Problem List    Diagnosis Date Noted     Tetrahydrocannabinol (THC) use disorder, mild, abuse 01/20/2022     Priority: Medium     THC use in pregnancy: currently smoking THC once daily down from  "several times per day prior to pregnancy.           History of depression 01/20/2022     Priority: Medium     1/20/22 Reports h/o depression but states that she is okay right now.         H/O gonorrhea 06/28/2021     Priority: Medium     6/28/21 POSITIVE gonorrhea.   7/1/21 Seen in clinic and treated with Ceftriaxone 500 mg IM x's 1 dose.        Migraine without aura 08/06/2020     Priority: Medium       Patient is an established patient of this clinic.         Review of Systems:   10 point ROS negative other than as specified above.         Physical Exam:   /83 (BP Location: Left arm, Patient Position: Sitting, Cuff Size: Adult Regular)   Pulse 97   Temp 99  F (37.2  C) (Tympanic)   Resp 18   Ht 1.651 m (5' 5\")   Wt 61.2 kg (135 lb)   SpO2 98%   BMI 22.47 kg/m      Vitals were reviewed and were normal     Exam:  Constitutional: healthy, alert, no distress, and cooperative  Head: Normocephalic. No masses, lesions, tenderness or abnormalities  Neck: Neck supple. No adenopathy.  ENT: throat normal without erythema or exudate  Cardiovascular: RRR w/o audible murmur  Respiratory: bilateral clear lungs w/o wheezing, crackles or rhonchi; breathing comfortably on RA  Gastrointestinal: Abdomen soft, non-tender. BS normal.  : Deferred  Musculoskeletal: extremities normal- no gross deformities noted, gait normal, and normal muscle tone  Skin: no suspicious lesions or rashes  Neurologic: grossly normal CN; normal strength, sensation, & tone  Psychiatric: mentation appears normal and affect normal/bright        Results:    Results from this visitNo results found for any visits on 12/01/22.        "

## 2022-12-01 NOTE — TELEPHONE ENCOUNTER
[facility] Answering Service Page    Received answering service page at [phone] regarding hives and reaction to sometime.    Patient reports she has had hives causing itching for the past 3 days. Hives over her abdomen, arms, and legs. She has been taking benadryl and applying hydrocortisone without relief.     Recommended patient call back to make an appointment for further evaluation today, which she accepted.     Patient voiced understanding of these recommendations and was in agreement with the plan. All questions were answered.    Asuncion Garcia, PGY-3  [facility]  12/01/22

## 2022-12-12 ENCOUNTER — OFFICE VISIT (OUTPATIENT)
Dept: FAMILY MEDICINE | Facility: CLINIC | Age: 32
End: 2022-12-12
Payer: COMMERCIAL

## 2022-12-12 VITALS
OXYGEN SATURATION: 97 % | DIASTOLIC BLOOD PRESSURE: 75 MMHG | RESPIRATION RATE: 16 BRPM | WEIGHT: 132.8 LBS | BODY MASS INDEX: 22.13 KG/M2 | HEIGHT: 65 IN | TEMPERATURE: 99 F | SYSTOLIC BLOOD PRESSURE: 119 MMHG | HEART RATE: 85 BPM

## 2022-12-12 DIAGNOSIS — R30.0 DYSURIA: Primary | ICD-10-CM

## 2022-12-12 DIAGNOSIS — M62.830 BACK MUSCLE SPASM: ICD-10-CM

## 2022-12-12 LAB
ALBUMIN UR-MCNC: ABNORMAL MG/DL
APPEARANCE UR: CLEAR
BACTERIA #/AREA URNS HPF: ABNORMAL /HPF
BILIRUB UR QL STRIP: NEGATIVE
CLUE CELLS: ABNORMAL
COLOR UR AUTO: YELLOW
GLUCOSE UR STRIP-MCNC: NEGATIVE MG/DL
HGB UR QL STRIP: ABNORMAL
KETONES UR STRIP-MCNC: ABNORMAL MG/DL
LEUKOCYTE ESTERASE UR QL STRIP: NEGATIVE
MUCOUS THREADS #/AREA URNS LPF: PRESENT /LPF
NITRATE UR QL: NEGATIVE
PH UR STRIP: 6 [PH] (ref 5–8)
RBC #/AREA URNS AUTO: ABNORMAL /HPF
SP GR UR STRIP: 1.02 (ref 1–1.03)
SQUAMOUS #/AREA URNS AUTO: ABNORMAL /LPF
TRICHOMONAS, WET PREP: ABNORMAL
UROBILINOGEN UR STRIP-ACNC: 0.2 E.U./DL
WBC #/AREA URNS AUTO: ABNORMAL /HPF
WBC'S/HIGH POWER FIELD, WET PREP: ABNORMAL
YEAST, WET PREP: ABNORMAL

## 2022-12-12 PROCEDURE — 81001 URINALYSIS AUTO W/SCOPE: CPT | Performed by: STUDENT IN AN ORGANIZED HEALTH CARE EDUCATION/TRAINING PROGRAM

## 2022-12-12 PROCEDURE — 87086 URINE CULTURE/COLONY COUNT: CPT | Performed by: STUDENT IN AN ORGANIZED HEALTH CARE EDUCATION/TRAINING PROGRAM

## 2022-12-12 PROCEDURE — 87210 SMEAR WET MOUNT SALINE/INK: CPT | Performed by: STUDENT IN AN ORGANIZED HEALTH CARE EDUCATION/TRAINING PROGRAM

## 2022-12-12 PROCEDURE — 87591 N.GONORRHOEAE DNA AMP PROB: CPT | Performed by: STUDENT IN AN ORGANIZED HEALTH CARE EDUCATION/TRAINING PROGRAM

## 2022-12-12 PROCEDURE — 87491 CHLMYD TRACH DNA AMP PROBE: CPT | Performed by: STUDENT IN AN ORGANIZED HEALTH CARE EDUCATION/TRAINING PROGRAM

## 2022-12-12 PROCEDURE — 99214 OFFICE O/P EST MOD 30 MIN: CPT | Mod: GC | Performed by: STUDENT IN AN ORGANIZED HEALTH CARE EDUCATION/TRAINING PROGRAM

## 2022-12-12 NOTE — PROGRESS NOTES
Preceptor Attestation:    I discussed the patient with the resident and evaluated the patient in person. I have verified the content of the note, which accurately reflects my assessment of the patient and the plan of care.   Supervising Physician:  Van Sheldon MD.

## 2022-12-12 NOTE — PROGRESS NOTES
Assessment and Plan    Antonette was seen today for uti, back pain and STD check.  Polyuria and dysuria for 4-5 days.  No fever or chills.  Tenderness on suprapubic area, negative CVA bilateral.  UA and wet prep did not reveal sign of infection, will wait for urine culture.  Lumbar paraspinous muscle spasm due to long sitting, no red flag sign.    Diagnoses and all orders for this visit:    Dysuria  -     Wet preparation  -     NEISSERIA GONORRHOEA PCR  -     CHLAMYDIA TRACHOMATIS PCR  -     UA reflex to Microscopic  -     Urine Culture  -     Urine Microscopic Exam    Back muscle spasm  -     diclofenac (VOLTAREN) 1 % topical gel; Apply 2 g topically 4 times daily as needed for moderate pain (4-6) Apply it to lower back             Options for treatment and follow-up care were reviewed with the patient. Patient engaged in the decision making process and verbalized understanding of the options discussed and agreed with the final plan.    Patient was staffed with attending physician MD Cm Marroquin MD PGY3           HPI       Antonette Horton is a 32 year old year old female w/ PMH of   Patient Active Problem List   Diagnosis     Migraine without aura     H/O gonorrhea     Tetrahydrocannabinol (THC) use disorder, mild, abuse     History of depression    who presents for urinary symptoms and back pain.  Patient stated she has increase in her urination frequency results associated with lower abdominal cramp for 4 to 5 days.  Also endorses vaginal spotting, or though she is in Depo.  She stated her urine became dark.  She denies fever but she endorses nausea and headache.  She has a previous history of diarrhea 7 to 10 days ago that is associated with bloating.  The diarrhea was resolved but still having the bloating.  Also endorses lower back pain for some time, achy not radiating.  Does exacerbate by long sitting.  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  Also she does work  "out.        Problem, Medication and Allergy Lists were   reviewed and updated if needed.     Patient Active Problem List    Diagnosis Date Noted     Tetrahydrocannabinol (THC) use disorder, mild, abuse 01/20/2022     Priority: Medium     THC use in pregnancy: currently smoking THC once daily down from several times per day prior to pregnancy.           History of depression 01/20/2022     Priority: Medium     1/20/22 Reports h/o depression but states that she is okay right now.         H/O gonorrhea 06/28/2021     Priority: Medium     6/28/21 POSITIVE gonorrhea.   7/1/21 Seen in clinic and treated with Ceftriaxone 500 mg IM x's 1 dose.        Migraine without aura 08/06/2020     Priority: Medium       Patient is an established patient of this clinic.         Review of Systems:   12 point ROS negative other than as specified above.         Physical Exam:   /75 (BP Location: Left arm, Patient Position: Sitting, Cuff Size: Adult Regular)   Pulse 85   Temp 99  F (37.2  C) (Oral)   Resp 16   Ht 1.638 m (5' 4.5\")   Wt 60.2 kg (132 lb 12.8 oz)   SpO2 97%   BMI 22.44 kg/m      Vitals were reviewed and were normal     Exam:  Constitutional: healthy, alert, no distress, and cooperative  Head: Normocephalic. No masses, lesions, tenderness or abnormalities  Neck: Neck supple. No adenopathy.  ENT: throat normal without erythema or exudate  Cardiovascular: RRR w/o audible murmur  Respiratory: bilateral clear lungs w/o wheezing, crackles or rhonchi; breathing comfortably on RA  Gastrointestinal: Abdomen soft, non-tender. BS normal.  Negative CVA bilateral  : Deferred  Musculoskeletal: extremities normal- no gross deformities noted, tenderness on lumbar paraspinal muscles, no tenderness over the spinal column.  Negative leg raise test bilateral  Skin: no suspicious lesions or rashes  Neurologic: grossly normal CN; normal strength, sensation, & tone  Psychiatric: mentation appears normal and affect normal/bright       "  Results:      Results from this visit  Results for orders placed or performed in visit on 12/12/22   UA reflex to Microscopic     Status: Abnormal   Result Value Ref Range    Color Urine Yellow Colorless, Straw, Light Yellow, Yellow    Appearance Urine Clear Clear    Glucose Urine Negative Negative mg/dL    Bilirubin Urine Negative Negative    Ketones Urine Trace (A) Negative mg/dL    Specific Gravity Urine 1.025 1.005 - 1.030    Blood Urine Trace (A) Negative    pH Urine 6.0 5.0 - 8.0    Protein Albumin Urine Trace (A) Negative mg/dL    Urobilinogen Urine 0.2 0.2, 1.0 E.U./dL    Nitrite Urine Negative Negative    Leukocyte Esterase Urine Negative Negative   Urine Microscopic Exam     Status: Abnormal   Result Value Ref Range    Bacteria Urine Few (A) None Seen /HPF    RBC Urine None Seen 0-2 /HPF /HPF    WBC Urine 0-5 0-5 /HPF /HPF    Squamous Epithelials Urine Few (A) None Seen /LPF    Mucus Urine Present (A) None Seen /LPF   Wet preparation     Status: Abnormal    Specimen: Vagina; Swab   Result Value Ref Range    Trichomonas Absent Absent    Yeast Absent Absent    Clue Cells Absent Absent    WBCs/high power field 1+ (A) None    Narrative    Bacteria moderate, odor none

## 2022-12-13 LAB
C TRACH DNA SPEC QL NAA+PROBE: NEGATIVE
N GONORRHOEA DNA SPEC QL NAA+PROBE: NEGATIVE

## 2022-12-14 LAB — BACTERIA UR CULT: NO GROWTH

## 2023-02-28 ENCOUNTER — OFFICE VISIT (OUTPATIENT)
Dept: FAMILY MEDICINE | Facility: CLINIC | Age: 33
End: 2023-02-28
Payer: COMMERCIAL

## 2023-02-28 VITALS
OXYGEN SATURATION: 100 % | RESPIRATION RATE: 18 BRPM | BODY MASS INDEX: 21.33 KG/M2 | HEIGHT: 65 IN | DIASTOLIC BLOOD PRESSURE: 69 MMHG | HEART RATE: 84 BPM | SYSTOLIC BLOOD PRESSURE: 104 MMHG | TEMPERATURE: 98.2 F | WEIGHT: 128 LBS

## 2023-02-28 DIAGNOSIS — Z30.42 ENCOUNTER FOR SURVEILLANCE OF INJECTABLE CONTRACEPTIVE: ICD-10-CM

## 2023-02-28 DIAGNOSIS — Z30.42 ENCOUNTER FOR SURVEILLANCE OF INJECTABLE CONTRACEPTIVE: Primary | ICD-10-CM

## 2023-02-28 LAB — HCG UR QL: NEGATIVE

## 2023-02-28 PROCEDURE — 81025 URINE PREGNANCY TEST: CPT

## 2023-02-28 PROCEDURE — 96372 THER/PROPH/DIAG INJ SC/IM: CPT | Performed by: FAMILY MEDICINE

## 2023-02-28 PROCEDURE — 99213 OFFICE O/P EST LOW 20 MIN: CPT | Mod: 25

## 2023-02-28 RX ORDER — MEDROXYPROGESTERONE ACETATE 150 MG/ML
150 INJECTION, SUSPENSION INTRAMUSCULAR
Status: DISCONTINUED | OUTPATIENT
Start: 2023-02-28 | End: 2023-09-14

## 2023-02-28 RX ADMIN — MEDROXYPROGESTERONE ACETATE 150 MG: 150 INJECTION, SUSPENSION INTRAMUSCULAR at 17:14

## 2023-02-28 NOTE — PROGRESS NOTES
Assessment and Plan      Encounter for surveillance of injectable contraceptive  Presents for contraceptive management for delayed Depo injection.  Patient last received Depo on 11/15/2022.  Has had unprotected sexual intercourse 2 weeks ago and has been amenorrheic since starting Depo injections.  Pregnancy test negative today.  No known contraindications to estrogen-containing contraception at this time.  Discussed with patient use of backup method for the next 7 days and repeat pregnancy test at home or in office in 2 to 4 weeks.  Emphasize importance of returning for repeat Depo injection within the 3-month window.  -     HCG qualitative urine  -     medroxyPROGESTERone (DEPO-PROVERA) injection 150 mg      Return in about 3 months (around 5/28/2023) for Follow up depo.    Patient was staffed with attending physician Dr. Hal Chen MD PGY1         HPI       Antonette Horton is a 32 year old year old female w/ PMH of   Patient Active Problem List   Diagnosis     Migraine without aura     H/O gonorrhea     Tetrahydrocannabinol (THC) use disorder, mild, abuse     History of depression     Presents for contraception management.  Has been on Depo for contraception since July 2022 after third child.  She has previously used a Nexplanon but had heavy vaginal bleeding.  Patient's last dose of Depo was on 11/15/2022.  She states that she is last sexually active 2 weeks ago and did not use protective contraception.  Patient has been without periods for many months. Patient would like to receive another Depo injection today.    She denies migraine with aura, hypertension, history of blood clot, history of cancer, or tobacco use (vapes occasionally).         Review of Systems:   10 point ROS negative other than as specified above.         Physical Exam:   /69 (BP Location: Left arm, Patient Position: Sitting, Cuff Size: Adult Regular)   Pulse 84   Temp 98.2  F (36.8  C) (Tympanic)   Resp 18   Ht 1.638  "m (5' 4.5\")   Wt 58.1 kg (128 lb)   SpO2 100%   BMI 21.63 kg/m       Exam:  Constitutional: Alert, no distress, and cooperative.  Head: Normocephalic. No masses, lesions, tenderness or abnormalities.  Cardiovascular: Regular rate and rhythm without audible murmur.  Respiratory: Lungs clear bilaterally and without wheezing, crackles or rhonchi. Breathing comfortably on room air.  Musculoskeletal: Extremities normal and without no gross deformities. Muscle tone normal.  Skin: No suspicious lesions or rashes.  Neurologic: Grossly normal cranial nerves. Normal strength, sensation and tone.  Psychiatric: Mentation appears normal and affect normal/bright.     Results from this visit  Results for orders placed or performed in visit on 02/28/23   HCG qualitative urine     Status: Normal   Result Value Ref Range    hCG Urine Qualitative Negative Negative       ----- Service Performed and Documented by Resident or Fellow ------    "

## 2023-02-28 NOTE — PATIENT INSTRUCTIONS
Thank you for coming into clinic today!    Get your depo injection  Use backup/barrier contraception for 7 days  Take home pregnancy test in 2-4 weeks to confirm absence of pregnancy  Follow up in 3 months for repeat depo injection  Call [facility] at [phone] with questions or concerns    Take care,  Bella Chen MD

## 2023-02-28 NOTE — NURSING NOTE
Clinic Administered Medication Documentation    Administrations This Visit     medroxyPROGESTERone (DEPO-PROVERA) injection 150 mg     Admin Date  02/28/2023 Action  $Given Dose  150 mg Route  Intramuscular Site  Left Ventrogluteal Administered By  Khris Brito    Ordering Provider: Jose Antonio Hong MD    Patient Supplied?: No                  Depo Provera Documentation    URINE HCG: negative    Depo-Provera Standing Order inclusion/exclusion criteria reviewed.   Patient meets: inclusion criteria     BP: 104/69  LAST PAP/EXAM: No results found for: PAP    Prior to injection, verified patient identity using patient's name and date of birth. Medication was administered. Please see MAR and medication order for additional information.     Was entire vial of medication used? Yes  Vial/Syringe: Single dose vial  Expiration Date:  05/31/2024    Patient instructed to stay in clinic after the injection but patient declined.  NEXT INJECTION DUE: 5/16/23 - 5/30/23      Name of provider who requested the medication administration: Dr. Chen   Name of provider on site (faculty or community preceptor) at the time of performing the medication administration: Dr. Hong     Date of next administration: 05/16/23-05/29/23  Date of next office visit with provider to renew medication plan (must be seen annually): 02/28/23

## 2023-03-02 ENCOUNTER — OFFICE VISIT (OUTPATIENT)
Dept: FAMILY MEDICINE | Facility: CLINIC | Age: 33
End: 2023-03-02
Payer: COMMERCIAL

## 2023-03-02 VITALS
TEMPERATURE: 99.9 F | HEART RATE: 90 BPM | SYSTOLIC BLOOD PRESSURE: 118 MMHG | OXYGEN SATURATION: 100 % | DIASTOLIC BLOOD PRESSURE: 79 MMHG | RESPIRATION RATE: 18 BRPM

## 2023-03-02 DIAGNOSIS — J02.0 STREP PHARYNGITIS: Primary | ICD-10-CM

## 2023-03-02 LAB — DEPRECATED S PYO AG THROAT QL EIA: POSITIVE

## 2023-03-02 PROCEDURE — 99213 OFFICE O/P EST LOW 20 MIN: CPT | Mod: GC | Performed by: STUDENT IN AN ORGANIZED HEALTH CARE EDUCATION/TRAINING PROGRAM

## 2023-03-02 PROCEDURE — 87880 STREP A ASSAY W/OPTIC: CPT | Performed by: STUDENT IN AN ORGANIZED HEALTH CARE EDUCATION/TRAINING PROGRAM

## 2023-03-02 RX ORDER — PENICILLIN V POTASSIUM 500 MG/1
500 TABLET, FILM COATED ORAL 2 TIMES DAILY
Qty: 20 TABLET | Refills: 0 | Status: SHIPPED | OUTPATIENT
Start: 2023-03-02 | End: 2023-03-12

## 2023-03-02 NOTE — LETTER
RETURN TO WORK/SCHOOL FORM    3/2/2023    Re: Antonette Horton  1990      To Whom It May Concern:     Antonette Horton was seen in clinic today. 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 Please excuse her for her absence. If you have any questions or concerns, please give us a call.        Restrictions:  Nonefull duty      Rossy Hinton MD  3/2/2023 4:48 PM

## 2023-03-03 DIAGNOSIS — M62.830 BACK MUSCLE SPASM: ICD-10-CM

## 2023-03-03 NOTE — PROGRESS NOTES
Assessment & Plan     Strep pharyngitis  Presented with sore throat, neck pain. Found to have bilateral swollen tonsils with exudates and bilateral cervical lymphadenopathy.   - Streptococcus A Rapid Screen w/Reflex to PCR - Positive   - penicillin V (VEETID) 500 MG tablet; Take 1 tablet (500 mg) by mouth 2 times daily for 10 days    Return if symptoms worsen or fail to improve.    Rossy Hinton MD  [facility] YULIET Whitman is a 32 year old presenting for the following health issues:  Pharyngitis (Sore throat, headache, body pain and neck pain)  Patient reports symptoms started on about 2 days ago. She denies fevers, chills, runny nose, cough, N/V/D or sick contacts.     Review of Systems   Constitutional, HEENT, cardiovascular, pulmonary, gi and gu systems are negative, except as otherwise noted.      Objective    /79 (BP Location: Left arm, Patient Position: Sitting, Cuff Size: Adult Regular)   Pulse 90   Temp 99.9  F (37.7  C) (Tympanic)   Resp 18   SpO2 100%   There is no height or weight on file to calculate BMI.     Physical Exam   GENERAL: healthy, alert and no distress  ENT: Bilaterally enlarged tonsils with exudates   NECK: Bilateral cervical adenopathy  RESP: lungs clear to auscultation - no rales, rhonchi or wheezes  CV: regular rate and rhythm, normal S1 S2, no S3 or S4, no murmur, click or rub, no peripheral edema and peripheral pulses strong  ABDOMEN: soft, nontender, no hepatosplenomegaly, no masses and bowel sounds normal  MS: no gross musculoskeletal defects noted, no edema    Results for orders placed or performed in visit on 03/02/23 (from the past 24 hour(s))   Streptococcus A Rapid Screen w/Reflex to PCR - Clinic Collect    Specimen: Throat; Swab   Result Value Ref Range    Group A Strep antigen Positive (A) Negative

## 2023-03-30 ENCOUNTER — E-VISIT (OUTPATIENT)
Dept: URGENT CARE | Facility: CLINIC | Age: 33
End: 2023-03-30
Payer: COMMERCIAL

## 2023-03-30 ENCOUNTER — TELEPHONE (OUTPATIENT)
Dept: FAMILY MEDICINE | Facility: CLINIC | Age: 33
End: 2023-03-30

## 2023-03-30 DIAGNOSIS — N76.0 BACTERIAL VAGINOSIS: Primary | ICD-10-CM

## 2023-03-30 DIAGNOSIS — B96.89 BACTERIAL VAGINOSIS: Primary | ICD-10-CM

## 2023-03-30 PROCEDURE — 99421 OL DIG E/M SVC 5-10 MIN: CPT | Performed by: PHYSICIAN ASSISTANT

## 2023-03-30 RX ORDER — METRONIDAZOLE 7.5 MG/G
1 GEL VAGINAL DAILY
Qty: 70 G | Refills: 0 | Status: SHIPPED | OUTPATIENT
Start: 2023-03-30 | End: 2023-04-04

## 2023-03-30 NOTE — PATIENT INSTRUCTIONS
Thank you for choosing us for your care. I have placed an order for a prescription so that you can start treatment. View your full visit summary for details by clicking on the link below. Your pharmacist will able to address any questions you may have about the medication.     If you re not feeling better within 2-3 days, please schedule an appointment.  You can schedule an appointment right here in [facility], or call [phone]  If the visit is for the same symptoms as your eVisit, we ll refund the cost of your eVisit if seen within seven days.      Bacterial Vaginosis    You have a vaginal infection called bacterial vaginosis (BV). Both good and bad bacteria are present in a healthy vagina. BV occurs when these bacteria get out of balance. The number of bad bacteria increase. And the number of good bacteria decrease. BV is linked with sexual activity, but it's not a sexually transmitted infection (STI).   BV may or may not cause symptoms. If symptoms do occur, they can include:     Thin, gray, milky-white, or sometimes green discharge    Unpleasant odor or  fishy  smell    Itching, burning, or pain in or around the vagina  It's not known what causes BV, but certain factors can make the problem more likely. These can include:     Douching    Spermicides    Use of antibiotics    Change in hormone levels with pregnancy, breastfeeding, or menopause    Having sex with a new partner    Having sex with more than one partner  BV will sometimes go away on its own. But treatment is often advised. This is because untreated BV can raise the risk of more serious health problems, such as:     Pelvic inflammatory disease (PID)     delivery (giving birth to a baby early if you re pregnant)    HIV and some other sexually transmitted infections (STIs)    Infection after surgery on the reproductive organs  Home care  General care    BV is most often treated with medicines called antibiotics. These may be given as pills or  as a vaginal cream. If antibiotics are prescribed, be sure to use them exactly as directed. And complete all of the medicine, even if your symptoms go away.    Don't douche or have sex during treatment.    If you have sex with a female partner, ask your healthcare provider if she should also be treated.  Prevention    Don't douche.    Don't have sex. If you do have sex, take steps to lower your risk:  ? Use condoms when having sex.  ? Limit the number of sex partners you have.    Follow-up care  Follow up with your healthcare provider, or as advised.   When to get medical advice  Call your healthcare provider right away if any of the following occur:     You have a fever of 100.4 F (38 C) or higher, or as directed by your healthcare provider.    Your symptoms get worse, or they don t go away within a few days of starting treatment.    You have new pain in the lower belly or pelvic region.    You have side effects that bother you or a reaction to the pills or cream you re prescribed.    You or any of your sex partners have new symptoms, such as a rash, joint pain, or sores.  [employer] last reviewed this educational content on 10/1/2022    2984-5747 [employer]. All rights reserved. This information is not intended as a substitute for professional medical care. Always follow your healthcare professional's instructions.

## 2023-03-30 NOTE — TELEPHONE ENCOUNTER
[facility] phone call message- general phone call:    Reason for call: Patient would like a call back from the nurse in regard to her mychart.Patient mention it's personal.     Return call needed: Yes    OK to leave a message on voice mail? Yes    Primary language: English      needed? No    Call taken on March 30, 2023 at 11:12 AM by Rebecca Jin

## 2023-03-30 NOTE — TELEPHONE ENCOUNTER
Called pt and she said she doesn't need help anymore. She had an e-visit which took care of her problem. Looks like she was seen for BV today via e-visit.    Ana Curtis RN on 3/30/2023 at 1:49 PM

## 2023-05-17 ENCOUNTER — ALLIED HEALTH/NURSE VISIT (OUTPATIENT)
Dept: FAMILY MEDICINE | Facility: CLINIC | Age: 33
End: 2023-05-17
Payer: COMMERCIAL

## 2023-05-17 VITALS — DIASTOLIC BLOOD PRESSURE: 81 MMHG | HEART RATE: 76 BPM | SYSTOLIC BLOOD PRESSURE: 120 MMHG

## 2023-05-17 DIAGNOSIS — Z30.013 ENCOUNTER FOR INITIAL PRESCRIPTION OF INJECTABLE CONTRACEPTIVE: Primary | ICD-10-CM

## 2023-05-17 PROCEDURE — 99207 PR NO BILLABLE SERVICE THIS VISIT: CPT

## 2023-05-17 NOTE — NURSING NOTE
Clinic Administered Medication Documentation      Depo Provera Documentation    Depo-Provera Standing Order inclusion/exclusion criteria reviewed.       Is this the initial or subsequent dose of Depo Provera? Subsequent dose - patient is within the acceptable window of time (11-15 weeks) for subsequent injection. Pregnancy test not indicated.    Patient meets: inclusion criteria     Is there an active order (written within the past 365 days, with administrations remaining, not ) in the chart? Yes.     Prior to injection, verified patient identity using patient's name and date of birth. Medication was administered. Please see MAR and medication order for additional information.     Vial/Syringe: Single dose vial. Was entire vial of medication used? Yes    Patient instructed to remain in clinic for 15 minutes and report any adverse reaction to staff immediately.  NEXT INJECTION DUE: 8/3/23 - 23    Verified that the patient has refills remaining in their prescription.        Name of provider who requested the medication administration: Dr. Garcia  Name of provider on site (faculty or community preceptor) at the time of performing the medication administration: Dr. Davenport    Date of next administration: 8/3/23-23  Date of next office visit with provider to renew medication plan (must be seen annually): 24

## 2023-05-21 ENCOUNTER — MYC MEDICAL ADVICE (OUTPATIENT)
Dept: FAMILY MEDICINE | Facility: CLINIC | Age: 33
End: 2023-05-21

## 2023-05-21 ENCOUNTER — E-VISIT (OUTPATIENT)
Dept: URGENT CARE | Facility: CLINIC | Age: 33
End: 2023-05-21

## 2023-05-21 ENCOUNTER — E-VISIT (OUTPATIENT)
Dept: URGENT CARE | Facility: CLINIC | Age: 33
End: 2023-05-21
Payer: COMMERCIAL

## 2023-05-21 DIAGNOSIS — N89.8 VAGINAL DISCHARGE: Primary | ICD-10-CM

## 2023-05-21 PROCEDURE — 99421 OL DIG E/M SVC 5-10 MIN: CPT | Performed by: INTERNAL MEDICINE

## 2023-05-21 PROCEDURE — 99422 OL DIG E/M SVC 11-20 MIN: CPT | Performed by: INTERNAL MEDICINE

## 2023-05-21 RX ORDER — METRONIDAZOLE 7.5 MG/G
1 GEL VAGINAL DAILY
Qty: 70 G | Refills: 0 | Status: SHIPPED | OUTPATIENT
Start: 2023-05-21 | End: 2023-05-28

## 2023-05-21 NOTE — PATIENT INSTRUCTIONS
Thank you for choosing us for your care. Given your symptoms, I would like you to do a lab-only visit to determine what is causing them.  I have placed the orders for a vaginal swab to test for causes of vaginal infection so we make sure we have the correct treatment.  I reviewed your  Please schedule an appointment with the lab right here in [facility], or call [phone].  I will let you know when the results are back and next steps to take.

## 2023-05-21 NOTE — PATIENT INSTRUCTIONS
Thank you for choosing us for your care. I see in your records that at times you have tested positive for bacterial vaginosis and at times that test has been negative.  I do advise having the test done that was previously ordered, as changes in soap that you describe can also contribute to yeast infections, which are treated differently. Also, your last wet prep vaginal swab test five months ago was negative for BV.  I do see a couple positive tests 6-12 months ago, but prior to that they were negative.  I do advise that you have this test done so we are sure what is causing your symptoms.  I have gone ahead and placed an order for a prescription for BV treatment, but if your symptoms do not fully resolve within 1 week or if they return within 2 months you definitely should be tested for yeast and BV.  View your full visit summary for details by clicking on the link below. Your pharmacist will able to address any questions you may have about the medication.     If you re not feeling better within 3-4days, please schedule an appointment.  You can schedule an appointment right here in [employer], or call [phone]

## 2023-05-22 RX ORDER — METRONIDAZOLE 7.5 MG/G
1 GEL VAGINAL DAILY
Qty: 25 G | Refills: 0 | Status: SHIPPED | OUTPATIENT
Start: 2023-05-22 | End: 2023-05-27

## 2023-05-22 NOTE — TELEPHONE ENCOUNTER
Routing to provider per pt's request for medication for BV. Called her and notified her she will most likely need to be seen but she would like to ask provider if she can get rx instead. She is at work and unable to talk long so was not able to ask her more regarding her symptoms.    Ana Curtis RN on 5/22/2023 at 9:57 AM

## 2023-06-13 ENCOUNTER — OFFICE VISIT (OUTPATIENT)
Dept: FAMILY MEDICINE | Facility: CLINIC | Age: 33
End: 2023-06-13
Payer: COMMERCIAL

## 2023-06-13 VITALS
WEIGHT: 128.2 LBS | DIASTOLIC BLOOD PRESSURE: 72 MMHG | HEART RATE: 84 BPM | OXYGEN SATURATION: 99 % | SYSTOLIC BLOOD PRESSURE: 111 MMHG | TEMPERATURE: 98.4 F | HEIGHT: 66 IN | RESPIRATION RATE: 24 BRPM | BODY MASS INDEX: 20.6 KG/M2

## 2023-06-13 DIAGNOSIS — R10.84 ABDOMINAL PAIN, GENERALIZED: ICD-10-CM

## 2023-06-13 DIAGNOSIS — K59.00 CONSTIPATION, UNSPECIFIED CONSTIPATION TYPE: Primary | ICD-10-CM

## 2023-06-13 DIAGNOSIS — K64.4 EXTERNAL HEMORRHOIDS: ICD-10-CM

## 2023-06-13 PROCEDURE — 87210 SMEAR WET MOUNT SALINE/INK: CPT | Performed by: STUDENT IN AN ORGANIZED HEALTH CARE EDUCATION/TRAINING PROGRAM

## 2023-06-13 PROCEDURE — 81001 URINALYSIS AUTO W/SCOPE: CPT | Performed by: STUDENT IN AN ORGANIZED HEALTH CARE EDUCATION/TRAINING PROGRAM

## 2023-06-13 PROCEDURE — 87591 N.GONORRHOEAE DNA AMP PROB: CPT | Performed by: STUDENT IN AN ORGANIZED HEALTH CARE EDUCATION/TRAINING PROGRAM

## 2023-06-13 PROCEDURE — 99214 OFFICE O/P EST MOD 30 MIN: CPT | Mod: GC | Performed by: STUDENT IN AN ORGANIZED HEALTH CARE EDUCATION/TRAINING PROGRAM

## 2023-06-13 PROCEDURE — 87491 CHLMYD TRACH DNA AMP PROBE: CPT | Performed by: STUDENT IN AN ORGANIZED HEALTH CARE EDUCATION/TRAINING PROGRAM

## 2023-06-13 RX ORDER — POLYETHYLENE GLYCOL 3350 17 G/17G
1 POWDER, FOR SOLUTION ORAL DAILY
Qty: 510 G | Refills: 4 | Status: SHIPPED | OUTPATIENT
Start: 2023-06-13 | End: 2023-09-14

## 2023-06-13 RX ORDER — HYDROCORTISONE 25 MG/G
CREAM TOPICAL 2 TIMES DAILY PRN
Qty: 30 G | Refills: 1 | Status: SHIPPED | OUTPATIENT
Start: 2023-06-13 | End: 2023-09-14

## 2023-06-13 RX ORDER — AMOXICILLIN 250 MG
1 CAPSULE ORAL DAILY
Qty: 30 TABLET | Refills: 0 | Status: SHIPPED | OUTPATIENT
Start: 2023-06-13 | End: 2023-09-14

## 2023-06-13 NOTE — PROGRESS NOTES
Assessment and Plan      Antonette was seen today for abdominal pain, vaginal bleeding and back pain.    Diagnoses and all orders for this visit:    Constipation, unspecified constipation type  Patient's history is consistent with chronic constipation.  Discussed management with MiraLAX and senna-docusate in addition to increased fiber diet and oral hydration.  -     polyethylene glycol (MIRALAX) 17 GM/Dose powder; Take 17 g (1 Capful) by mouth daily  -     senna-docusate (SENOKOT-S/PERICOLACE) 8.6-50 MG tablet; Take 1 tablet by mouth daily    External hemorrhoid  Patient feels he may have hemorrhoids but declined exam, so we will treat presumptively with perianal hydrocortisone.  External hemorrhoids would be consistent with chronic constipation.  -     hydrocortisone, Perianal, (HYDROCORTISONE) 2.5 % cream; Place rectally 2 times daily as needed for hemorrhoids    Abdominal pain, generalized  Wet prep, UA, gonorrhea, and chlamydia were negative for convincing infection.  -     Wet preparation  -     UA Macroscopic with reflex to Microscopic and Culture  -     Neisseria gonorrhoeae PCR  -     Chlamydia trachomatis PCR; Future  -     Chlamydia trachomatis PCR  -     UA Microscopic with Reflex to Culture    Ordering of each unique test    Options for treatment and follow-up care were reviewed with the patient. Patient engaged in the decision making process and verbalized understanding of the options discussed and agreed with the final plan.    Patient was staffed with attending physician Dr. Little.    Travon Tovar MD PGY-3           HPI       Antonette Horton is a 32 year old year old female w/ PMH of   Patient Active Problem List   Diagnosis     Migraine without aura     H/O gonorrhea     Tetrahydrocannabinol (THC) use disorder, mild, abuse     History of depression    who presents for   Chief Complaint   Patient presents with     Abdominal Pain     Abdominal pain for a week - feeling constipated/ difficulty with  "bowel movement.  Lack of appitie/      Vaginal Bleeding     Pt spotting  even tho shes on Deppo and not due for another shot till August      Back Pain     Lower back pain for 2 weeks now---> feeling bubbly      Patient notes that she has been having multiple weeks of generalized abdominal pain without fever, chills, body aches, diarrhea, bloody stools.  She does have history of chronic constipation and feels constipated over the past several weeks.  Reports smaller harder BMs.  Has not used any medications for symptomatic relief.    She also feels that she may have hemorrhoids, but declined physical exam.  She is confident she does not have any fissures.    Notes that she has been spotting even though she has been on the Depo-Provera and next dose is not due until August.  She noted no vaginal odor or discharge.         Review of Systems:   8 point ROS negative other than as specified above.         Physical Exam:   /72   Pulse 84   Temp 98.4  F (36.9  C) (Oral)   Resp 24   Ht 1.664 m (5' 5.5\")   Wt 58.2 kg (128 lb 3.2 oz)   SpO2 99%   BMI 21.01 kg/m      Vitals were reviewed and were normal     Exam:  Constitutional: healthy, alert, no distress, and cooperative  Head: normocephalic  Cardiovascular: appears well perfused; RRR w/o audible murmur  Respiratory: breathing comfortably on RA; bilateral clear lungs w/o wheezing, crackles or rhonchi  Abdomen: soft, non-tender, non-distended; no CVA tenderness  Musculoskeletal: extremities normal- no gross deformities noted, gait normal  Skin: no suspicious lesions or rashes on exposed skin  Neurologic: grossly normal CN  Psychiatric: mentation appears normal and affect normal       Results:      Results from this visit  Results for orders placed or performed in visit on 06/13/23   UA Macroscopic with reflex to Microscopic and Culture     Status: Abnormal    Specimen: Urine, Midstream   Result Value Ref Range    Color Urine Dark Yellow (A) Colorless, Straw, " Light Yellow, Yellow    Appearance Urine Cloudy (A) Clear    Glucose Urine Negative Negative mg/dL    Bilirubin Urine Negative Negative    Ketones Urine Trace (A) Negative mg/dL    Specific Gravity Urine >=1.030 1.005 - 1.030    Blood Urine Large (A) Negative    pH Urine 6.0 5.0 - 8.0    Protein Albumin Urine 30 (A) Negative mg/dL    Urobilinogen Urine 0.2 0.2, 1.0 E.U./dL    Nitrite Urine Negative Negative    Leukocyte Esterase Urine Negative Negative   UA Microscopic with Reflex to Culture     Status: Abnormal   Result Value Ref Range    Bacteria Urine Moderate (A) None Seen /HPF    RBC Urine 2-5 (A) 0-2 /HPF /HPF    WBC Urine None Seen 0-5 /HPF /HPF    Squamous Epithelials Urine Few (A) None Seen /LPF    Narrative    Urine Culture not indicated   Wet preparation     Status: Abnormal    Specimen: Vagina; Swab   Result Value Ref Range    Trichomonas Absent Absent    Yeast Absent Absent    Clue Cells Present (A) Absent    WBCs/high power field None None   Neisseria gonorrhoeae PCR     Status: Normal    Specimen: Urine, Voided   Result Value Ref Range    Neisseria gonorrhoeae Negative Negative   Chlamydia trachomatis PCR     Status: Normal    Specimen: Urine, Voided   Result Value Ref Range    Chlamydia trachomatis Negative Negative

## 2023-06-14 ENCOUNTER — TELEPHONE (OUTPATIENT)
Dept: FAMILY MEDICINE | Facility: CLINIC | Age: 33
End: 2023-06-14
Payer: COMMERCIAL

## 2023-06-14 LAB
ALBUMIN UR-MCNC: 30 MG/DL
APPEARANCE UR: ABNORMAL
BACTERIA #/AREA URNS HPF: ABNORMAL /HPF
BILIRUB UR QL STRIP: NEGATIVE
C TRACH DNA SPEC QL NAA+PROBE: NEGATIVE
COLOR UR AUTO: ABNORMAL
GLUCOSE UR STRIP-MCNC: NEGATIVE MG/DL
HGB UR QL STRIP: ABNORMAL
KETONES UR STRIP-MCNC: ABNORMAL MG/DL
LEUKOCYTE ESTERASE UR QL STRIP: NEGATIVE
N GONORRHOEA DNA SPEC QL NAA+PROBE: NEGATIVE
NITRATE UR QL: NEGATIVE
PH UR STRIP: 6 [PH] (ref 5–8)
RBC #/AREA URNS AUTO: ABNORMAL /HPF
SP GR UR STRIP: >=1.03 (ref 1–1.03)
SQUAMOUS #/AREA URNS AUTO: ABNORMAL /LPF
UROBILINOGEN UR STRIP-ACNC: 0.2 E.U./DL
WBC #/AREA URNS AUTO: ABNORMAL /HPF

## 2023-06-14 NOTE — TELEPHONE ENCOUNTER
[facility] phone call message- general phone call:    Reason for call:     Patient would like a nurse to return her phone call in regarding to her results.    Return call needed: Yes    OK to leave a message on voice mail? Yes    Primary language: English      needed? No    Call taken on June 14, 2023 at 11:15 AM by Rebecca Jin

## 2023-06-14 NOTE — TELEPHONE ENCOUNTER
Called pt letting her know urine culture not indicated base off of UA. Still waiting for chlamydia and gonorrheae to come back.    Routing to provider to see if he would like to add anything else.    Ana Curtis RN on 6/14/2023 at 11:42 AM

## 2023-06-15 ENCOUNTER — ANCILLARY PROCEDURE (OUTPATIENT)
Dept: GENERAL RADIOLOGY | Facility: CLINIC | Age: 33
End: 2023-06-15
Attending: PHYSICIAN ASSISTANT
Payer: COMMERCIAL

## 2023-06-15 ENCOUNTER — OFFICE VISIT (OUTPATIENT)
Dept: URGENT CARE | Facility: URGENT CARE | Age: 33
End: 2023-06-15
Payer: COMMERCIAL

## 2023-06-15 VITALS
WEIGHT: 128 LBS | RESPIRATION RATE: 16 BRPM | TEMPERATURE: 98.4 F | BODY MASS INDEX: 21.33 KG/M2 | SYSTOLIC BLOOD PRESSURE: 123 MMHG | OXYGEN SATURATION: 100 % | HEIGHT: 65 IN | DIASTOLIC BLOOD PRESSURE: 85 MMHG | HEART RATE: 65 BPM

## 2023-06-15 DIAGNOSIS — L03.90 CELLULITIS OF SKIN: ICD-10-CM

## 2023-06-15 DIAGNOSIS — S99.912A ANKLE INJURY, LEFT, INITIAL ENCOUNTER: ICD-10-CM

## 2023-06-15 DIAGNOSIS — S49.92XA INJURY OF LEFT UPPER ARM, INITIAL ENCOUNTER: ICD-10-CM

## 2023-06-15 DIAGNOSIS — S93.402A SPRAIN OF LEFT ANKLE, UNSPECIFIED LIGAMENT, INITIAL ENCOUNTER: Primary | ICD-10-CM

## 2023-06-15 DIAGNOSIS — T07.XXXA ABRASIONS OF MULTIPLE SITES: ICD-10-CM

## 2023-06-15 PROCEDURE — 73080 X-RAY EXAM OF ELBOW: CPT | Mod: TC | Performed by: RADIOLOGY

## 2023-06-15 PROCEDURE — 73610 X-RAY EXAM OF ANKLE: CPT | Mod: TC | Performed by: RADIOLOGY

## 2023-06-15 PROCEDURE — 73090 X-RAY EXAM OF FOREARM: CPT | Mod: TC | Performed by: RADIOLOGY

## 2023-06-15 PROCEDURE — 96372 THER/PROPH/DIAG INJ SC/IM: CPT | Performed by: PHYSICIAN ASSISTANT

## 2023-06-15 PROCEDURE — 99215 OFFICE O/P EST HI 40 MIN: CPT | Mod: 25 | Performed by: PHYSICIAN ASSISTANT

## 2023-06-15 RX ORDER — CEPHALEXIN 500 MG/1
500 CAPSULE ORAL 3 TIMES DAILY
Qty: 30 CAPSULE | Refills: 0 | Status: SHIPPED | OUTPATIENT
Start: 2023-06-15 | End: 2023-06-25

## 2023-06-15 RX ORDER — ACETAMINOPHEN AND CODEINE PHOSPHATE 300; 30 MG/1; MG/1
1 TABLET ORAL EVERY 6 HOURS PRN
Qty: 10 TABLET | Refills: 0 | Status: SHIPPED | OUTPATIENT
Start: 2023-06-15 | End: 2023-06-18

## 2023-06-15 RX ORDER — KETOROLAC TROMETHAMINE 30 MG/ML
30 INJECTION, SOLUTION INTRAMUSCULAR; INTRAVENOUS ONCE
Status: COMPLETED | OUTPATIENT
Start: 2023-06-15 | End: 2023-06-15

## 2023-06-15 RX ADMIN — KETOROLAC TROMETHAMINE 30 MG: 30 INJECTION, SOLUTION INTRAMUSCULAR; INTRAVENOUS at 18:55

## 2023-06-15 ASSESSMENT — PAIN SCALES - GENERAL: PAINLEVEL: WORST PAIN (10)

## 2023-06-15 NOTE — PATIENT INSTRUCTIONS
(S93.402A) Sprain of left ankle, unspecified ligament, initial encounter  (primary encounter diagnosis)  Comment:   Plan: Ankle/Foot Bracing Supplies DME Walking Boot;         Left; Pneumatic; Tall, ketorolac (TORADOL)         injection 30 mg, acetaminophen-codeine (TYLENOL        #3) 300-30 MG per tablet    Tylenol #3 will cause drowsiness.  Take with food and I suggest only taking at bedtime as needed for any pain not relieved by the toradol or tylenol.            Follow up with Orthopedics for re-check if symptoms persist after removing cam walker in 2 weeks.      See handout on ankle sprains    (S99.912A) Ankle injury, left, initial encounter  Comment:   Plan: XR Ankle Left G/E 3 Views            (S49.92XA) Injury of left upper arm, initial encounter  Comment:   Plan: XR Forearm Left 2 Views, XR Elbow Left G/E 3         Views            (T07.XXXA) Abrasions of multiple sites  Comment:   Plan: ketorolac (TORADOL) injection 30 mg,         acetaminophen-codeine (TYLENOL #3) 300-30 MG         per tablet    Wounds cleaned and dressed.  Leave tegaderm (clear dressing) on for 4-5 days to let area heal from the inside out.    If dressing peels off before that, clean gently, pat dry and replace dressing (just the plain clear one)  The white dressing inside the clear dressing was placed to help soak up some of the drainage.      Keep the wounds covered until they have healed.  Replacing the clear dressing every 4-5 days.  Sooner as needed.            (L03.90) Cellulitis of skin  Comment: left forearm  Plan: cephALEXin (KEFLEX) 500 MG capsule          Follow up with Primary Clinic for re-check of skin wounds if symptoms fail to improve as anticipated.      Keep the lesions out of the sun after they heal so that the new skin does not burn and scar.

## 2023-06-15 NOTE — PROGRESS NOTES
(S93.402A) Sprain of left ankle, unspecified ligament, initial encounter  (primary encounter diagnosis)  Comment:   Plan: Ankle/Foot Bracing Supplies DME Walking Boot;         Left; Pneumatic; Tall, ketorolac (TORADOL)         injection 30 mg, acetaminophen-codeine (TYLENOL        #3) 300-30 MG per tablet    Tylenol #3 will cause drowsiness.  Take with food and I suggest only taking at bedtime as needed for any pain not relieved by the toradol or tylenol.            Follow up with Orthopedics for re-check if symptoms persist after removing cam walker in 2 weeks.      See handout on ankle sprains    (S99.912A) Ankle injury, left, initial encounter  Comment:   Plan: XR Ankle Left G/E 3 Views            (S49.92XA) Injury of left upper arm, initial encounter  Comment:   Plan: XR Forearm Left 2 Views, XR Elbow Left G/E 3         Views            (T07.XXXA) Abrasions of multiple sites  Comment:   Plan: ketorolac (TORADOL) injection 30 mg,         acetaminophen-codeine (TYLENOL #3) 300-30 MG         per tablet    Wounds cleaned and dressed.  Leave tegaderm (clear dressing) on for 4-5 days to let area heal from the inside out.    If dressing peels off before that, clean gently, pat dry and replace dressing (just the plain clear one)  The white dressing inside the clear dressing was placed to help soak up some of the drainage.      Keep the wounds covered until they have healed.  Replacing the clear dressing every 4-5 days.  Sooner as needed.            (L03.90) Cellulitis of skin  Comment: left forearm  Plan: cephALEXin (KEFLEX) 500 MG capsule          Follow up with Primary Clinic for re-check of skin wounds if symptoms fail to improve as anticipated.      Keep the lesions out of the sun after they heal so that the new skin does not burn and scar.     47 minutes spent by me on the date of the encounter doing chart review, review of test results, interpretation of tests, patient visit and documentation       SUBJECTIVE:    Antonette Horton is a 32 year old female who presents today with multiple abrasions on her left buttock left upper arm and hand after falling onto the pavement yesterday.  She also complains of left ankle pain, as she twisted her ankle as she fell.  She denies any head trauma.  The ankle pain is significant, it is difficult for her to walk secondary to the pain and swelling.      Past Medical History:   Diagnosis Date     Known health problems: none          Current Outpatient Medications   Medication Sig Dispense Refill     Multiple Vitamins-Iron (DAILY-MONICA/IRON/BETA-CAROTENE) TABS TAKE 1 TABLET BY MOUTH DAILY. (Patient not taking: Reported on 10/19/2020) 30 tablet 7     Social History     Tobacco Use     Smoking status: Never Smoker     Smokeless tobacco: Never Used   Substance Use Topics     Alcohol use: Not on file     Family History   Problem Relation Age of Onset     Diabetes Mother      Diabetes Father          ROS:    10 point ROS of systems including Constitutional, Eyes, Respiratory, Cardiovascular, Gastroenterology, Genitourinary, Integumentary, Muscularskeletal, Psychiatric ,neurological were all negative except for pertinent positives noted in my HPI       OBJECTIVE:  Breastfeeding No   Physical Exam:  GENERAL APPEARANCE: healthy, alert and no distress  EYES: EOMI,  PERRL, conjunctiva clear  Extremities: Left forearm with 2 large abrasions approximately 3 cm x 3 cm each with surrounding erythema.  The forearm is tender to palpation.  Range of motion is decreased at the elbow secondary to the pain.  Left ankle: Lateral swelling, with superficial abrasion.  Joint is stable albeit tender.  NEURO: Normal strength and tone, sensory exam grossly normal,  normal speech and mentation  SKIN: Multiple abrasions.  Small abrasion on left third MCP.  2 abrasions on left forearm as denoted above.  Abrasion on left gluteus approximately 4 cm x 5 cm.  Abrasion on left lateral chest: Approximately 3 cm x 2 cm.  Other  small abrasions on right wrist, superficial.  Head and neck are free of abrasions.    X-Ray was done, my findings are:   Left forearm: No fractures  Left elbow: No fractures  Left ankle: Significant soft tissue swelling, no fractures.

## 2023-06-22 NOTE — PROGRESS NOTES
Preceptor Attestation:    I discussed the patient with the resident and evaluated the patient in person. I have verified the content of the note, which accurately reflects my assessment of the patient and the plan of care.   Supervising Physician:  Florentino Little MD.

## 2023-08-08 ENCOUNTER — OFFICE VISIT (OUTPATIENT)
Dept: URGENT CARE | Facility: URGENT CARE | Age: 33
End: 2023-08-08
Payer: COMMERCIAL

## 2023-08-08 VITALS
RESPIRATION RATE: 16 BRPM | OXYGEN SATURATION: 100 % | SYSTOLIC BLOOD PRESSURE: 122 MMHG | HEART RATE: 74 BPM | TEMPERATURE: 98.8 F | WEIGHT: 125 LBS | DIASTOLIC BLOOD PRESSURE: 79 MMHG | HEIGHT: 65 IN | BODY MASS INDEX: 20.83 KG/M2

## 2023-08-08 DIAGNOSIS — R31.9 URINARY TRACT INFECTION WITH HEMATURIA, SITE UNSPECIFIED: Primary | ICD-10-CM

## 2023-08-08 DIAGNOSIS — B96.89 BV (BACTERIAL VAGINOSIS): ICD-10-CM

## 2023-08-08 DIAGNOSIS — N76.0 BV (BACTERIAL VAGINOSIS): ICD-10-CM

## 2023-08-08 DIAGNOSIS — N39.0 URINARY TRACT INFECTION WITH HEMATURIA, SITE UNSPECIFIED: Primary | ICD-10-CM

## 2023-08-08 LAB
ALBUMIN UR-MCNC: 30 MG/DL
APPEARANCE UR: CLEAR
BACTERIA #/AREA URNS HPF: ABNORMAL /HPF
BILIRUB UR QL STRIP: NEGATIVE
CLUE CELLS: PRESENT
COLOR UR AUTO: YELLOW
GLUCOSE UR STRIP-MCNC: NEGATIVE MG/DL
HGB UR QL STRIP: ABNORMAL
KETONES UR STRIP-MCNC: ABNORMAL MG/DL
LEUKOCYTE ESTERASE UR QL STRIP: ABNORMAL
MUCOUS THREADS #/AREA URNS LPF: PRESENT /LPF
NITRATE UR QL: NEGATIVE
PH UR STRIP: 7.5 [PH] (ref 5–7)
RBC #/AREA URNS AUTO: ABNORMAL /HPF
SP GR UR STRIP: 1.02 (ref 1–1.03)
SQUAMOUS #/AREA URNS AUTO: ABNORMAL /LPF
TRICHOMONAS, WET PREP: ABNORMAL
UROBILINOGEN UR STRIP-ACNC: 1 E.U./DL
WBC #/AREA URNS AUTO: ABNORMAL /HPF
WBC'S/HIGH POWER FIELD, WET PREP: ABNORMAL
YEAST, WET PREP: ABNORMAL

## 2023-08-08 PROCEDURE — 81001 URINALYSIS AUTO W/SCOPE: CPT | Performed by: PHYSICIAN ASSISTANT

## 2023-08-08 PROCEDURE — 87086 URINE CULTURE/COLONY COUNT: CPT | Performed by: PHYSICIAN ASSISTANT

## 2023-08-08 PROCEDURE — 99214 OFFICE O/P EST MOD 30 MIN: CPT | Performed by: PHYSICIAN ASSISTANT

## 2023-08-08 PROCEDURE — 87210 SMEAR WET MOUNT SALINE/INK: CPT | Performed by: PHYSICIAN ASSISTANT

## 2023-08-08 PROCEDURE — 87186 SC STD MICRODIL/AGAR DIL: CPT | Performed by: PHYSICIAN ASSISTANT

## 2023-08-08 PROCEDURE — 87088 URINE BACTERIA CULTURE: CPT | Performed by: PHYSICIAN ASSISTANT

## 2023-08-08 RX ORDER — CIPROFLOXACIN 500 MG/1
500 TABLET, FILM COATED ORAL 2 TIMES DAILY
Qty: 10 TABLET | Refills: 0 | Status: SHIPPED | OUTPATIENT
Start: 2023-08-08 | End: 2023-08-13

## 2023-08-08 RX ORDER — METRONIDAZOLE 7.5 MG/G
1 GEL VAGINAL DAILY
Qty: 70 G | Refills: 0 | Status: SHIPPED | OUTPATIENT
Start: 2023-08-08 | End: 2023-08-13

## 2023-08-08 RX ORDER — METRONIDAZOLE 500 MG/1
500 TABLET ORAL 2 TIMES DAILY
Qty: 14 TABLET | Refills: 0 | Status: SHIPPED | OUTPATIENT
Start: 2023-08-08 | End: 2023-08-15

## 2023-08-08 NOTE — PROGRESS NOTES
Assessment & Plan     Urinary tract infection with hematuria, site unspecified    You have been diagnosed with a UTI.  This is one of the most common infections in women because women have a shorter urethra than men. Bacteria have a shorter distance to travel to reach the bladder.. Women who have gone through menopause also lose the protection from estrogen that lowers the chance of getting a UTI. And some women are at higher risk because of their genes. The most common cause of bladder infections is bacteria from the bowels. The bacteria get onto the skin around the opening of the urethra. From there, they can get into the urine. Then they travel up to the bladder, causing inflammation and infection.  Make sure you urinate after sex, drink plenty of fluids and do not hold in your urine.  We have started you on antibiotics for infection and we are awaiting urine culture results, if there is antibiotic resistance on the culture we will call you and switch antibiotics.     UA normal  Urine culture pending    - UA Macroscopic with reflex to Microscopic and Culture - Clinic Collect  - UA Microscopic with Reflex to Culture  - Urine Culture  - ciprofloxacin (CIPRO) 500 MG tablet; Take 1 tablet (500 mg) by mouth 2 times daily for 5 days    BV (bacterial vaginosis)    You have a vaginal infection called bacterial vaginosis (BV). BV occurs when the good bacteria are outnumbered by the bad bacteria causes an imbalance in the vagina. BV is linked with sexual activity, but it's not a sexually transmitted infection (STI).   BV may or may not cause symptoms of thin, gray, milky-white, or sometimes green discharge, unpleasant odor or  fishy  smell.  BV is most often treated with medicines called antibiotics. These may be given as pills or as a vaginal cream.  Do not drink alcohol while on Flagyl or Metrogel as this can cause severe nausea and vomiting.    It's not known what causes BV, but certain factors can make the problem  "more likely. These can include:   Douching  Spermicides  Use of antibiotics  Change in hormone levels with pregnancy, breastfeeding, or menopause  Having sex with a new partner  Having sex with more than one partner      - Wet prep - Clinic Collect  - metroNIDAZOLE (METROGEL) 0.75 % vaginal gel; Place 1 applicator (5 g) vaginally daily for 5 days  - metroNIDAZOLE (FLAGYL) 500 MG tablet; Take 1 tablet (500 mg) by mouth 2 times daily for 7 days    Review of external notes as documented elsewhere in note    At today's visit with Antonette Horton , we discussed results, diagnosis, medications and formulated a plan.  We also discussed red flags for immediate return to clinic/ER, as well as indications for follow up with PCP if not improved in 3 days. Patient understood and agreed to plan. Antonette Horton was discharged with stable vitals and has no further questions.       No follow-ups on file.    Curtis Estrada, [facility], PA-C  [facility] MATEUS Whitman is a 33 year old, presenting for the following health issues:  Urgent Care (UTI symptoms started x 3 days ago, patient states she is spotting but on Depo)      HPI   Review of Systems   Constitutional, HEENT, cardiovascular, pulmonary, gi and gu systems are negative, except as otherwise noted.      Objective    /79   Pulse 74   Temp 98.8  F (37.1  C) (Tympanic)   Resp 16   Ht 1.651 m (5' 5\")   Wt 56.7 kg (125 lb)   SpO2 100%   BMI 20.80 kg/m    Body mass index is 20.8 kg/m .  Physical Exam   GENERAL: healthy, alert and no distress  RESP: lungs clear to auscultation - no rales, rhonchi or wheezes  CV: regular rate and rhythm, normal S1 S2, no S3 or S4, no murmur, click or rub, no peripheral edema and peripheral pulses strong  ABDOMEN: soft, nontender, no hepatosplenomegaly, no masses and bowel sounds normal   (female): deferred  MS: no gross musculoskeletal defects noted, no edema  SKIN: no suspicious lesions or rashes  NEURO: Normal " strength and tone, mentation intact and speech normal  PSYCH: mentation appears normal, affect normal/bright        Results for orders placed or performed in visit on 08/08/23   UA Macroscopic with reflex to Microscopic and Culture - Clinic Collect     Status: Abnormal    Specimen: Urine, Clean Catch   Result Value Ref Range    Color Urine Yellow Colorless, Straw, Light Yellow, Yellow    Appearance Urine Clear Clear    Glucose Urine Negative Negative mg/dL    Bilirubin Urine Negative Negative    Ketones Urine Trace (A) Negative mg/dL    Specific Gravity Urine 1.020 1.003 - 1.035    Blood Urine Moderate (A) Negative    pH Urine 7.5 (H) 5.0 - 7.0    Protein Albumin Urine 30 (A) Negative mg/dL    Urobilinogen Urine 1.0 0.2, 1.0 E.U./dL    Nitrite Urine Negative Negative    Leukocyte Esterase Urine Small (A) Negative   UA Microscopic with Reflex to Culture     Status: Abnormal   Result Value Ref Range    Bacteria Urine Many (A) None Seen /HPF    RBC Urine 5-10 (A) 0-2 /HPF /HPF    WBC Urine 10-25 (A) 0-5 /HPF /HPF    Squamous Epithelials Urine Few (A) None Seen /LPF    Mucus Urine Present (A) None Seen /LPF   Wet prep - Clinic Collect     Status: Abnormal    Specimen: Vagina; Swab   Result Value Ref Range    Trichomonas Absent Absent    Yeast Absent Absent    Clue Cells Present (A) Absent    WBCs/high power field 2+ (A) None

## 2023-08-12 LAB
BACTERIA UR CULT: ABNORMAL
BACTERIA UR CULT: ABNORMAL

## 2023-09-07 ENCOUNTER — TELEPHONE (OUTPATIENT)
Dept: PEDIATRICS | Facility: CLINIC | Age: 33
End: 2023-09-07
Payer: COMMERCIAL

## 2023-09-07 ENCOUNTER — TELEPHONE (OUTPATIENT)
Dept: FAMILY MEDICINE | Facility: CLINIC | Age: 33
End: 2023-09-07
Payer: COMMERCIAL

## 2023-09-07 NOTE — TELEPHONE ENCOUNTER
Reason for Call:  Appointment Request    Patient requesting this type of appt: Procedure: patient was on nexplanon and switched to depo - wants to go back on nexplanon - says that she is overdue for depo     Requested provider:  any provider in [place]     Reason patient unable to be scheduled:  was not able to schedule any appointments in [place]     When does patient want to be seen/preferred time:  would like to be seen as soon as possible     Comments: Patient says that she only wants to come in to the clinic the day she gets the nexplanon - wants everything else done as a virtual visit     Could we send this information to you in [employer] or would you prefer to receive a phone call?:   Patient would prefer a phone call   Okay to leave a detailed message?: Yes at Home number on file [phone] (home)    Call taken on 9/7/2023 at 12:12 PM by Olga Soria

## 2023-09-07 NOTE — TELEPHONE ENCOUNTER
Discussed with Dr Selby and stephanie to schedule Nexplanon insertion.  Pt will need pregnancy test prior to insertion and as long as negative can have Nexplanon inserted.    Called pt and gave her info.  Scheduled her for Nexplanon insertion for 9/14/23 at 11:00 AM.  Routed note to Dr Ortega./My Keller RN BSN

## 2023-09-07 NOTE — TELEPHONE ENCOUNTER
[facility] phone call message- general phone call:    Reason for call: She is currently on the depo but she is late for it she wants to switch to the nexplanon but is not sure if she needs a bc consult  to switch or can she just scheduled a nexplanon insert.    Action desired: call back.    Return call needed: Yes    OK to leave a message on voice mail? Yes    Advised patient to response may take up to 2 business days: Yes    Primary language: English      needed? No    Call taken on September 7, 2023 at 11:31 AM by Flaco Head

## 2023-09-07 NOTE — TELEPHONE ENCOUNTER
Patient was scheduled with another location and pcp after this message was sent.  Closing encounter.

## 2023-09-14 ENCOUNTER — OFFICE VISIT (OUTPATIENT)
Dept: FAMILY MEDICINE | Facility: CLINIC | Age: 33
End: 2023-09-14
Payer: COMMERCIAL

## 2023-09-14 VITALS
DIASTOLIC BLOOD PRESSURE: 74 MMHG | BODY MASS INDEX: 19.97 KG/M2 | SYSTOLIC BLOOD PRESSURE: 124 MMHG | HEART RATE: 67 BPM | TEMPERATURE: 97.7 F | WEIGHT: 120 LBS

## 2023-09-14 DIAGNOSIS — B37.31 CANDIDIASIS OF VAGINA: ICD-10-CM

## 2023-09-14 DIAGNOSIS — Z30.09 GENERAL COUNSELLING AND ADVICE ON CONTRACEPTION: ICD-10-CM

## 2023-09-14 DIAGNOSIS — Z30.017 INSERTION OF IMPLANTABLE SUBDERMAL CONTRACEPTIVE: Primary | ICD-10-CM

## 2023-09-14 DIAGNOSIS — N89.8 VAGINAL ITCHING: ICD-10-CM

## 2023-09-14 DIAGNOSIS — R30.0 DYSURIA: ICD-10-CM

## 2023-09-14 LAB
ALBUMIN UR-MCNC: 30 MG/DL
APPEARANCE UR: CLEAR
BILIRUB UR QL STRIP: ABNORMAL
CLUE CELLS: ABNORMAL
COLOR UR AUTO: YELLOW
GLUCOSE UR STRIP-MCNC: NEGATIVE MG/DL
HCG UR QL: NEGATIVE
HGB UR QL STRIP: ABNORMAL
KETONES UR STRIP-MCNC: NEGATIVE MG/DL
LEUKOCYTE ESTERASE UR QL STRIP: NEGATIVE
NITRATE UR QL: NEGATIVE
PH UR STRIP: 5.5 [PH] (ref 5–8)
SP GR UR STRIP: >=1.03 (ref 1–1.03)
TRICHOMONAS, WET PREP: ABNORMAL
UROBILINOGEN UR STRIP-ACNC: 0.2 E.U./DL
WBC'S/HIGH POWER FIELD, WET PREP: ABNORMAL
YEAST, WET PREP: PRESENT

## 2023-09-14 PROCEDURE — 81025 URINE PREGNANCY TEST: CPT

## 2023-09-14 PROCEDURE — 87210 SMEAR WET MOUNT SALINE/INK: CPT

## 2023-09-14 PROCEDURE — 99213 OFFICE O/P EST LOW 20 MIN: CPT | Mod: 25

## 2023-09-14 PROCEDURE — 81003 URINALYSIS AUTO W/O SCOPE: CPT

## 2023-09-14 PROCEDURE — 87086 URINE CULTURE/COLONY COUNT: CPT

## 2023-09-14 PROCEDURE — 11981 INSERTION DRUG DLVR IMPLANT: CPT | Mod: GC

## 2023-09-14 RX ORDER — FLUCONAZOLE 150 MG/1
150 TABLET ORAL ONCE
Qty: 1 TABLET | Refills: 0 | Status: SHIPPED | OUTPATIENT
Start: 2023-09-14 | End: 2023-09-14

## 2023-09-14 NOTE — PROGRESS NOTES
Assessment & Plan     Insertion of implantable subdermal contraceptive  General counselling and advice on contraception  UPT negative. RBA reviewed. Desires to proceed.   - etonogestrel (NEXPLANON) subdermal implant 68 mg  - INSERTION NON-BIODEGRADABLE DRUG DELIVERY IMPLANT  - HCG Qualitative Urine  - HCG Qualitative Urine    Vaginal itching  Candidiasis of vagina  - Wet preparation  - fluconazole (DIFLUCAN) 150 MG tablet  Dispense: 1 tablet; Refill: 0    Dysuria  - Urine Macroscopic with reflex to Microscopic  - Urine Macroscopic with reflex to Microscopic  - Urine Culture      No follow-ups on file.    Joanna Ortega MD  [facility] YULIET Whitman is a 33 year old, presenting for the following health issues:  Contraception (Nexplanon ) and UTI (Possible UTI. Itching, cramping )        9/14/2023    11:04 AM   Additional Questions   Roomed by toshia   Accompanied by family       HPI     Patient presents today for counseling on contraception as well as some symptoms of dysuria and abnormal vaginal odor.  She would like to have Nexplanon inserted as she has been having Depo over the last several months and she does not like how it makes her feels and she has absent..  She has previously had Nexplanon when she was immediately postpartum but had it removed after some heavy bleeding but she would like to try again.  She is noted abnormal vaginal odor over the last week or 2 as well as some increased irritation in that area.  She has been urinating more frequently.  She is concerned she has either BV or a bladder infection.  She denies fever, chills, chest pain, shortness of breath, abdominal pain.      Review of Systems   Constitutional, HEENT, cardiovascular, pulmonary, gi and gu systems are negative, except as otherwise noted.      Objective    /74 (BP Location: Right arm, Patient Position: Sitting, Cuff Size: Adult Regular)   Pulse 67   Temp 97.7  F (36.5  C) (Tympanic)   Wt 54.4  kg (120 lb)   BMI 19.97 kg/m    Body mass index is 19.97 kg/m .  Physical Exam   GENERAL: healthy, alert and no distress  RESP: lungs clear to auscultation - no rales, rhonchi or wheezes  CV: regular rate and rhythm, normal S1 S2, no S3 or S4, no murmur, click or rub, no peripheral edema and peripheral pulses strong  MS: no gross musculoskeletal defects noted, no edema    I precepted today with Dr Chappell.    Elzbieta Ortega MD PGY-3

## 2023-09-14 NOTE — PATIENT INSTRUCTIONS
Thank you for taking the time to discuss your health with me today!    Today we discussed:  We have inserted your Nexplanon as requested. Please call if you have any severe redness, pain or swelling. You can take Ibuprofen or Tylenol as needed for discomfort. You can remove the bandage after 24 hours.   2. You have yeast infection. Take Diflucan once at it should resolve.     As always, please call the clinic or message with any questions or concerns.     Best Wishes,  Joanna Ortega MD.

## 2023-09-14 NOTE — PROGRESS NOTES
Preceptor Attestation:    I discussed the patient with the resident and evaluated the patient in person. I was present for and supervised the entire procedure. I have verified the content of the note, which accurately reflects my assessment of the patient and the plan of care.   Supervising Physician:  Willy Chappell MD.

## 2023-09-14 NOTE — PROGRESS NOTES
Procedure Note-Nexplanon Insertion    Antonette Horton is a patient of José Luis Pearce here for placement of etonogestrel implant (Nexplanon)    Indication:Contraception    Consent: Affirmation of informed consent was signed and scanned into the medical record. Risks, benefits and alternatives were discussed. Patient's questions were elicited and answered.   Procedure safety checklist was completed:  Yes  Time Out (Pause for the Cause) completed: Yes    Labs: UPT:  Negative  LMP:   No LMP recorded. Patient has had an injection.       Previous Contraception:  depoprovera and missed last Depo dose  Counseling:  Pt. counseled on potential side effects of  Nexplanon, including unpredictable spotting/bleeding and plan for removal/replacement of Nexplanon in 3 years or less.    Preoperative Diagnosis: Desires effective contraception  Postoperative Diagnosis: etonogestrel implant in place  Skin prep Betadine  Anesthesia 1% lidocaine    Technique:   Patient was placed supine with right arm exposed.  Medhat was made 8-10cm above medial epicondial and a guiding medhat 4 cm above the first.  Arm was prepped with betadine.Insertion point was anesthetized with 1% lidocaine 2mL. After stretching the skin with thumb and index finger around the insertion site.  Skin punctured with the tip of the needle inserted at 30 degrees and then lowered to horizontal position. While lifting the skin with the tip of the needle, slided the needle to it's full length. Applicator was then stabilized and the purple slider was unlocked by pushing it slightly down. Slider moved back completely until it stopped. Applicator was then removed.  Correct placement of the implant was confirmed by palpation in the patient's arm and visualizing the purple top of the obturator.  Insertion site was dressed with an adhesive covered by a pressure dressing.      User card and patient chart label filled out. User card  given to patient for record.  Nexplanon added to medication list     Lot# [  ]    EBL: minimal  Complications:  No  Tolerance:  Pt tolerated procedure well and was in stable condition.         Follow up: Pt was instructed to call if bleeding, severe pain or foul smell.     Follow up in 2-4 weeks.  Resident: Joanna Ortega MD  Faculty: Willy Chappell MD present for and supervised this entire procedure.

## 2023-09-16 LAB — BACTERIA UR CULT: NO GROWTH

## 2023-10-04 ENCOUNTER — OFFICE VISIT (OUTPATIENT)
Dept: URGENT CARE | Facility: URGENT CARE | Age: 33
End: 2023-10-04
Payer: COMMERCIAL

## 2023-10-04 VITALS
RESPIRATION RATE: 20 BRPM | TEMPERATURE: 98 F | HEART RATE: 78 BPM | SYSTOLIC BLOOD PRESSURE: 128 MMHG | DIASTOLIC BLOOD PRESSURE: 78 MMHG | OXYGEN SATURATION: 98 %

## 2023-10-04 DIAGNOSIS — R30.0 DYSURIA: Primary | ICD-10-CM

## 2023-10-04 DIAGNOSIS — J02.9 SORE THROAT: ICD-10-CM

## 2023-10-04 LAB
ALBUMIN UR-MCNC: ABNORMAL MG/DL
APPEARANCE UR: CLEAR
BACTERIA #/AREA URNS HPF: ABNORMAL /HPF
BILIRUB UR QL STRIP: NEGATIVE
CLUE CELLS: ABNORMAL
COLOR UR AUTO: YELLOW
DEPRECATED S PYO AG THROAT QL EIA: NEGATIVE
GLUCOSE UR STRIP-MCNC: NEGATIVE MG/DL
HGB UR QL STRIP: ABNORMAL
KETONES UR STRIP-MCNC: NEGATIVE MG/DL
LEUKOCYTE ESTERASE UR QL STRIP: NEGATIVE
MUCOUS THREADS #/AREA URNS LPF: PRESENT /LPF
NITRATE UR QL: NEGATIVE
PH UR STRIP: 5.5 [PH] (ref 5–7)
RBC #/AREA URNS AUTO: ABNORMAL /HPF
SP GR UR STRIP: >=1.03 (ref 1–1.03)
SQUAMOUS #/AREA URNS AUTO: ABNORMAL /LPF
TRICHOMONAS, WET PREP: ABNORMAL
UROBILINOGEN UR STRIP-ACNC: 0.2 E.U./DL
WBC #/AREA URNS AUTO: ABNORMAL /HPF
WBC'S/HIGH POWER FIELD, WET PREP: ABNORMAL
YEAST, WET PREP: ABNORMAL

## 2023-10-04 PROCEDURE — 87210 SMEAR WET MOUNT SALINE/INK: CPT | Performed by: INTERNAL MEDICINE

## 2023-10-04 PROCEDURE — 87086 URINE CULTURE/COLONY COUNT: CPT | Performed by: FAMILY MEDICINE

## 2023-10-04 PROCEDURE — 87651 STREP A DNA AMP PROBE: CPT | Performed by: FAMILY MEDICINE

## 2023-10-04 PROCEDURE — 99214 OFFICE O/P EST MOD 30 MIN: CPT | Performed by: FAMILY MEDICINE

## 2023-10-04 PROCEDURE — 81001 URINALYSIS AUTO W/SCOPE: CPT

## 2023-10-04 RX ORDER — PHENAZOPYRIDINE HYDROCHLORIDE 200 MG/1
200 TABLET, FILM COATED ORAL 3 TIMES DAILY PRN
Qty: 6 TABLET | Refills: 0 | Status: SHIPPED | OUTPATIENT
Start: 2023-10-04 | End: 2024-01-23

## 2023-10-04 RX ORDER — CIPROFLOXACIN 500 MG/1
500 TABLET, FILM COATED ORAL 2 TIMES DAILY
Qty: 10 TABLET | Refills: 0 | Status: SHIPPED | OUTPATIENT
Start: 2023-10-04 | End: 2023-10-09

## 2023-10-04 NOTE — PROGRESS NOTES
SUBJECTIVE:   Antonette Horton is a 33 year old female who  presents today for a possible UTI. Symptoms of dysuria and frequency have been going on for 2day(s).  Hematuria no.  sudden onset and worseningand moderate.  There is no history of fever, chills, nausea or vomiting. This patient does have a history of urinary tract infections. Just had yeast infection and wanted to make sure that this cleared up.    Had spotting currently, just had implant placed so thinks that this may be causing menstrual irregularity.    Also complain of sore throat for about 1 week, hurts to swallow.  Mouth and tongue feels different.  No fever.      Past Medical History:   Diagnosis Date    Known health problems: none      No current outpatient medications on file.     Social History     Tobacco Use    Smoking status: Former     Types: Cigarettes     Quit date:      Years since quittin.7    Smokeless tobacco: Never    Tobacco comments:     vape   Substance Use Topics    Alcohol use: Not Currently       ROS:   Review of systems negative except as stated above.    OBJECTIVE:  /78   Pulse 78   Temp 98  F (36.7  C) (Tympanic)   Resp 20   SpO2 98%   GENERAL APPEARANCE: healthy, alert and no distress  MOUTH: tonsils with no exudates, uvula midline  PSYCH: mentation appears normal and affect normal/bright    Results for orders placed or performed in visit on 10/04/23   UA Macroscopic with reflex to Microscopic and Culture     Status: Abnormal    Specimen: Urine, Clean Catch   Result Value Ref Range    Color Urine Yellow Colorless, Straw, Light Yellow, Yellow    Appearance Urine Clear Clear    Glucose Urine Negative Negative mg/dL    Bilirubin Urine Negative Negative    Ketones Urine Negative Negative mg/dL    Specific Gravity Urine >=1.030 1.003 - 1.035    Blood Urine Large (A) Negative    pH Urine 5.5 5.0 - 7.0    Protein Albumin Urine Trace (A) Negative mg/dL    Urobilinogen Urine 0.2 0.2, 1.0 E.U./dL    Nitrite Urine  Negative Negative    Leukocyte Esterase Urine Negative Negative   UA Microscopic with Reflex to Culture     Status: Abnormal   Result Value Ref Range    Bacteria Urine Moderate (A) None Seen /HPF    RBC Urine 5-10 (A) 0-2 /HPF /HPF    WBC Urine 0-5 0-5 /HPF /HPF    Squamous Epithelials Urine Few (A) None Seen /LPF    Mucus Urine Present (A) None Seen /LPF    Narrative    Urine Culture not indicated   Streptococcus A Rapid Screen w/Reflex to PCR     Status: Normal    Specimen: Throat; Swab   Result Value Ref Range    Group A Strep antigen Negative Negative       ASSESSMENT/PLAN:   (R30.0) Dysuria  (primary encounter diagnosis)  Plan: UA Macroscopic with reflex to Microscopic and         Culture, UA Microscopic with Reflex to Culture,        Urine Culture Aerobic Bacterial - lab collect,         ciprofloxacin (CIPRO) 500 MG tablet,         phenazopyridine (PYRIDIUM) 200 MG tablet            (J02.9) Sore throat  Comment: viral  Plan: Streptococcus A Rapid Screen w/Reflex to PCR,         Group A Streptococcus PCR Throat Swab            Empiric treatment for presumptive UTI with RX cipro given.  Will follow up on urine culture and adjust medication if needed.  RX pyridium given to help with dysuria symptoms.  Drink plenty of fluids.  Prevention and treatment of UTI's discussed.    Reassurance given in regards to sore throat, most likely viral etiology and encourage symptomatic treatment with tylenol, ibuprofen, plenty of fluids and rest.  Will follow up on throat culture and treat if positive for strep.    Follow up with primary provider if no improvement of symptoms in 1 week    Pool Haney MD  October 4, 2023 5:31 PM

## 2023-10-05 LAB — GROUP A STREP BY PCR: NOT DETECTED

## 2023-10-06 LAB — BACTERIA UR CULT: NORMAL

## 2023-10-25 ENCOUNTER — OFFICE VISIT (OUTPATIENT)
Dept: URGENT CARE | Facility: URGENT CARE | Age: 33
End: 2023-10-25
Payer: COMMERCIAL

## 2023-10-25 VITALS
OXYGEN SATURATION: 98 % | SYSTOLIC BLOOD PRESSURE: 130 MMHG | TEMPERATURE: 98 F | DIASTOLIC BLOOD PRESSURE: 78 MMHG | HEART RATE: 78 BPM | RESPIRATION RATE: 20 BRPM

## 2023-10-25 DIAGNOSIS — R30.0 DYSURIA: Primary | ICD-10-CM

## 2023-10-25 LAB
ALBUMIN UR-MCNC: 30 MG/DL
APPEARANCE UR: CLEAR
BACTERIA #/AREA URNS HPF: ABNORMAL /HPF
BILIRUB UR QL STRIP: NEGATIVE
CLUE CELLS: ABNORMAL
COLOR UR AUTO: YELLOW
GLUCOSE UR STRIP-MCNC: NEGATIVE MG/DL
HGB UR QL STRIP: ABNORMAL
KETONES UR STRIP-MCNC: NEGATIVE MG/DL
LEUKOCYTE ESTERASE UR QL STRIP: NEGATIVE
NITRATE UR QL: NEGATIVE
PH UR STRIP: 7.5 [PH] (ref 5–7)
RBC #/AREA URNS AUTO: ABNORMAL /HPF
SP GR UR STRIP: 1.02 (ref 1–1.03)
SQUAMOUS #/AREA URNS AUTO: ABNORMAL /LPF
TRICHOMONAS, WET PREP: ABNORMAL
UROBILINOGEN UR STRIP-ACNC: 1 E.U./DL
WBC #/AREA URNS AUTO: ABNORMAL /HPF
WBC'S/HIGH POWER FIELD, WET PREP: ABNORMAL
YEAST, WET PREP: ABNORMAL

## 2023-10-25 PROCEDURE — 81001 URINALYSIS AUTO W/SCOPE: CPT

## 2023-10-25 PROCEDURE — 99213 OFFICE O/P EST LOW 20 MIN: CPT | Performed by: PHYSICIAN ASSISTANT

## 2023-10-25 PROCEDURE — 87210 SMEAR WET MOUNT SALINE/INK: CPT

## 2023-10-26 NOTE — PROGRESS NOTES
Assessment & Plan     1. Dysuria  - UA Macroscopic with reflex to Microscopic and Culture  - Wet preparation  - UA Microscopic with Reflex to Culture      Urine and wet prep are negative.  No evidence of pyelonephritis, urosepsis or nephrolithiasis.  E Coli on , but Last urine cultures have been negative  Advised follow-up with PCP for symptoms.   Discussed with patient if development of fever, chills, vomiting unable to hold down fluids, flank pain or weakness/dizziness/ lightheadedness to follow-up in clinic or ER right away.         [facility]    CHIEF COMPLAINT:   Chief Complaint   Patient presents with    Urinary Pain     Poss uti      Subjective     Antonette is a 33 year old female who presents to clinic today for evaluation of possible UTI. She reports that dysuria and urinary frequency have been recurrent. Denies having fever, chills, flank pain, nausea, vomiting, hematuria or weakness.  Vaginal discharge, itching or pain are not present.     Past Medical History:   Diagnosis Date    Known health problems: none      Past Surgical History:   Procedure Laterality Date    DILATION AND CURETTAGE      NO HISTORY OF SURGERY       Social History     Tobacco Use    Smoking status: Former     Types: Cigarettes     Quit date:      Years since quittin.8    Smokeless tobacco: Never    Tobacco comments:     vape   Substance Use Topics    Alcohol use: Not Currently     Current Outpatient Medications   Medication    phenazopyridine (PYRIDIUM) 200 MG tablet     No current facility-administered medications for this visit.     Allergies   Allergen Reactions    Trimethoprim Itching, Nausea, Rash and Nausea and Vomiting       10 point ROS of systems were all negative except for pertinent positives noted in my HPI.      Exam:   /78   Pulse 78   Temp 98  F (36.7  C)   Resp 20   SpO2 98%   Constitutional: healthy, alert and no distress  ENT: MMM  Cardiovascular:  RRR  Respiratory: CTA bilaterally, no rhonchi or rales  Skin: no rashes      Results for orders placed or performed in visit on 10/25/23   UA Macroscopic with reflex to Microscopic and Culture     Status: Abnormal    Specimen: Urine, Clean Catch   Result Value Ref Range    Color Urine Yellow Colorless, Straw, Light Yellow, Yellow    Appearance Urine Clear Clear    Glucose Urine Negative Negative mg/dL    Bilirubin Urine Negative Negative    Ketones Urine Negative Negative mg/dL    Specific Gravity Urine 1.020 1.003 - 1.035    Blood Urine Large (A) Negative    pH Urine 7.5 (H) 5.0 - 7.0    Protein Albumin Urine 30 (A) Negative mg/dL    Urobilinogen Urine 1.0 0.2, 1.0 E.U./dL    Nitrite Urine Negative Negative    Leukocyte Esterase Urine Negative Negative   UA Microscopic with Reflex to Culture     Status: Abnormal   Result Value Ref Range    Bacteria Urine Many (A) None Seen /HPF    RBC Urine 0-2 0-2 /HPF /HPF    WBC Urine 0-5 0-5 /HPF /HPF    Squamous Epithelials Urine Few (A) None Seen /LPF    Narrative    Urine Culture not indicated   Wet preparation     Status: Abnormal    Specimen: Vagina; Swab   Result Value Ref Range    Trichomonas Absent Absent    Yeast Absent Absent    Clue Cells Absent Absent    WBCs/high power field 1+ (A) None

## 2023-11-25 ENCOUNTER — HEALTH MAINTENANCE LETTER (OUTPATIENT)
Age: 33
End: 2023-11-25

## 2023-12-08 ENCOUNTER — TELEPHONE (OUTPATIENT)
Dept: FAMILY MEDICINE | Facility: CLINIC | Age: 33
End: 2023-12-08

## 2023-12-08 ENCOUNTER — OFFICE VISIT (OUTPATIENT)
Dept: FAMILY MEDICINE | Facility: CLINIC | Age: 33
End: 2023-12-08
Payer: COMMERCIAL

## 2023-12-08 VITALS
DIASTOLIC BLOOD PRESSURE: 84 MMHG | RESPIRATION RATE: 20 BRPM | HEART RATE: 78 BPM | OXYGEN SATURATION: 100 % | TEMPERATURE: 98.4 F | WEIGHT: 128.8 LBS | SYSTOLIC BLOOD PRESSURE: 134 MMHG | BODY MASS INDEX: 21.46 KG/M2 | HEIGHT: 65 IN

## 2023-12-08 DIAGNOSIS — R30.0 DYSURIA: Primary | ICD-10-CM

## 2023-12-08 DIAGNOSIS — N89.8 VAGINAL IRRITATION: ICD-10-CM

## 2023-12-08 DIAGNOSIS — J10.1 INFLUENZA A: Primary | ICD-10-CM

## 2023-12-08 DIAGNOSIS — R05.1 ACUTE COUGH: ICD-10-CM

## 2023-12-08 DIAGNOSIS — Z11.3 ROUTINE SCREENING FOR STI (SEXUALLY TRANSMITTED INFECTION): ICD-10-CM

## 2023-12-08 DIAGNOSIS — J10.1 INFLUENZA A: ICD-10-CM

## 2023-12-08 LAB
ALBUMIN UR-MCNC: 30 MG/DL
APPEARANCE UR: CLEAR
BACTERIA #/AREA URNS HPF: ABNORMAL /HPF
BILIRUB UR QL STRIP: NEGATIVE
CLUE CELLS: ABNORMAL
COLOR UR AUTO: YELLOW
FLUAV RNA SPEC QL NAA+PROBE: POSITIVE
FLUBV RNA RESP QL NAA+PROBE: NEGATIVE
GLUCOSE UR STRIP-MCNC: NEGATIVE MG/DL
HGB UR QL STRIP: NEGATIVE
KETONES UR STRIP-MCNC: ABNORMAL MG/DL
LEUKOCYTE ESTERASE UR QL STRIP: NEGATIVE
MUCOUS THREADS #/AREA URNS LPF: PRESENT /LPF
NITRATE UR QL: NEGATIVE
PH UR STRIP: 6.5 [PH] (ref 5–8)
RBC #/AREA URNS AUTO: ABNORMAL /HPF
RSV RNA SPEC NAA+PROBE: NEGATIVE
SARS-COV-2 RNA RESP QL NAA+PROBE: NEGATIVE
SP GR UR STRIP: 1.02 (ref 1–1.03)
TRICHOMONAS, WET PREP: ABNORMAL
UROBILINOGEN UR STRIP-ACNC: 1 E.U./DL
WBC #/AREA URNS AUTO: ABNORMAL /HPF
WBC'S/HIGH POWER FIELD, WET PREP: ABNORMAL
YEAST, WET PREP: ABNORMAL

## 2023-12-08 PROCEDURE — 87637 SARSCOV2&INF A&B&RSV AMP PRB: CPT

## 2023-12-08 PROCEDURE — 87210 SMEAR WET MOUNT SALINE/INK: CPT

## 2023-12-08 PROCEDURE — 81001 URINALYSIS AUTO W/SCOPE: CPT

## 2023-12-08 PROCEDURE — 87491 CHLMYD TRACH DNA AMP PROBE: CPT

## 2023-12-08 PROCEDURE — 99213 OFFICE O/P EST LOW 20 MIN: CPT | Mod: GC

## 2023-12-08 PROCEDURE — 87591 N.GONORRHOEAE DNA AMP PROB: CPT

## 2023-12-08 RX ORDER — OSELTAMIVIR PHOSPHATE 75 MG/1
75 CAPSULE ORAL 2 TIMES DAILY
Qty: 10 CAPSULE | Refills: 0 | Status: SHIPPED | OUTPATIENT
Start: 2023-12-08 | End: 2023-12-13

## 2023-12-08 RX ORDER — ACETAMINOPHEN 500 MG
500-1000 TABLET ORAL EVERY 6 HOURS PRN
Qty: 30 TABLET | Refills: 1 | Status: SHIPPED | OUTPATIENT
Start: 2023-12-08

## 2023-12-08 RX ORDER — GUAIFENESIN 200 MG/10ML
200 LIQUID ORAL EVERY 4 HOURS PRN
Qty: 236 ML | Refills: 1 | Status: SHIPPED | OUTPATIENT
Start: 2023-12-08

## 2023-12-08 NOTE — PROGRESS NOTES
Assessment & Plan     Dysuria  No signs of infection.  Showing signs of dehydration.  Encourage patient to drink more fluids.  - UA Macroscopic with reflex to Microscopic and Culture  - UA Microscopic with Reflex to Culture    Vaginal irritation  History of BV and yeast.  - Wet preparation    Influenza A  Acute cough  Been around a co-worker who tested positive for COVID.  Been around a cousin who was recently sick and did not test positive for COVID.  Has had an acute cough, rhinorrhea, and bodyaches.  - Symptomatic Influenza A/B, RSV, & SARS-CoV2 PCR (COVID-19) Nose    Routine screening for STI (sexually transmitted infection)  History of chlamydia and gonorrhea in the past.  - Chlamydia trachomatis PCR - Clinic Collect  - Neisseria gonorrhoeae PCR - Clinic Collect        Patient was staffed with supervising physician, Dr. Pretty Burnham .    Lucia Jane MD  [facility] YULIET Whitman is a 33 year old, presenting for the following health issues:  OTHER (TEST for FLU & COVID, CHILL, STUFFY NOSE) and UTI (Little discharged)        12/8/2023    11:22 AM   Additional Questions   Roomed by jazmyn   Accompanied by self       HPI   Congestion, headache, body ache, coughing with emesis.  Nyquil helped some. Cousin was sick earlier. Someone she works works tested positive for COVID. She would like to be tested today. She states she has a decreased appetite and fluid intake.    Increased vaginal discharge. History of yeast and BV. States she would like a cream if she tests positive.    Going to the bathroom less. Small amount when she is able to go. Endorses mild dysuria.     Patient would also like to be screened for sexually transmitted infections at this time.      Review of Systems   ROS is negative other than what is listed in the HPI.      Objective    /84 (BP Location: Right arm, Patient Position: Sitting, Cuff Size: Adult Regular)   Pulse 78   Temp 98.4  F (36.9  C) (Oral)    "Resp 20   Ht 1.64 m (5' 4.57\")   Wt 58.4 kg (128 lb 12.8 oz)   SpO2 100%   BMI 21.72 kg/m    Body mass index is 21.72 kg/m .    Physical Exam   General appearance: alert, in no distress, cooperative  Head: normocephalic, without obvious abnormalities  Eyes: conjunctivae/corneas robb  Ears: hearing grossly intact  Nose: nares normal, no drainage  Lung: Speaking in full sentences, no cough, no wheezing  Neurologic: Ambulatory, spontaneously moving upper and lower extremities without pain  Psychologic: Appropriate mood    Results for orders placed or performed in visit on 12/08/23   UA Macroscopic with reflex to Microscopic and Culture     Status: Abnormal    Specimen: Urine, Midstream   Result Value Ref Range    Color Urine Yellow Colorless, Straw, Light Yellow, Yellow    Appearance Urine Clear Clear    Glucose Urine Negative Negative mg/dL    Bilirubin Urine Negative Negative    Ketones Urine Trace (A) Negative mg/dL    Specific Gravity Urine 1.025 1.005 - 1.030    Blood Urine Negative Negative    pH Urine 6.5 5.0 - 8.0    Protein Albumin Urine 30 (A) Negative mg/dL    Urobilinogen Urine 1.0 0.2, 1.0 E.U./dL    Nitrite Urine Negative Negative    Leukocyte Esterase Urine Negative Negative   UA Microscopic with Reflex to Culture     Status: Abnormal   Result Value Ref Range    Bacteria Urine None Seen None Seen /HPF    RBC Urine None Seen 0-2 /HPF /HPF    WBC Urine 0-5 0-5 /HPF /HPF    Mucus Urine Present (A) None Seen /LPF    Narrative    Urine Culture not indicated   Symptomatic Influenza A/B, RSV, & SARS-CoV2 PCR (COVID-19) Nose     Status: Abnormal    Specimen: Nose; Swab   Result Value Ref Range    Influenza A PCR Positive (A) Negative    Influenza B PCR Negative Negative    RSV PCR Negative Negative    SARS CoV2 PCR Negative Negative    Narrative    Testing was performed using the Xpert Xpress CoV2/Flu/RSV Assay on the Cepheid GeneXpert Instrument. This test should be ordered for the detection of SARS-CoV-2, " influenza, and RSV viruses in individuals who meet clinical and/or epidemiological criteria. Test performance is unknown in asymptomatic patients. This test is for in vitro diagnostic use under the FDA EUA for laboratories certified under CLIA to perform high or moderate complexity testing. This test has not been FDA cleared or approved. A negative result does not rule out the presence of PCR inhibitors in the specimen or target RNA in concentration below the limit of detection for the assay. If only one viral target is positive but coinfection with multiple targets is suspected, the sample should be re-tested with another FDA cleared, approved, or authorized test, if coinfection would change clinical management. This test was validated by the [facility]. These laboratories are certified under the Clinical Laboratory Improvement Amendments of 1988 (CLIA-88) as qualified to perform high complexity laboratory testing.   Wet preparation     Status: Abnormal    Specimen: Vagina; Swab   Result Value Ref Range    Trichomonas Absent Absent    Yeast Absent Absent    Clue Cells Absent Absent    WBCs/high power field 1+ (A) None    Narrative    Bacteria few   Chlamydia trachomatis PCR - Clinic Collect     Status: Normal    Specimen: Urine, Voided   Result Value Ref Range    Chlamydia trachomatis Negative Negative   Neisseria gonorrhoeae PCR - Clinic Collect     Status: Normal    Specimen: Urine, Voided   Result Value Ref Range    Neisseria gonorrhoeae Negative Negative       ----- Service Performed and Documented by Resident or Fellow ------

## 2023-12-08 NOTE — PROGRESS NOTES
"Preceptor attestation:  Vital signs reviewed: /84 (BP Location: Right arm, Patient Position: Sitting, Cuff Size: Adult Regular)   Pulse 78   Temp 98.4  F (36.9  C) (Oral)   Resp 20   Ht 1.64 m (5' 4.57\")   Wt 58.4 kg (128 lb 12.8 oz)   SpO2 100%   BMI 21.72 kg/m      Patient seen, evaluated, and discussed with the resident.  I verified the content of the note, which accurately reflects my assessment of the patient and the plan of care.    Supervising physician: Pretty Burnham MD  [facility]  "

## 2023-12-08 NOTE — LETTER
To whom it may concern,      Please excuse Antonette from work on 12/8/2023 for a doctor's appointment.       Sincerely,      Lucia Jane MD, PGY3  [facility]

## 2023-12-09 LAB
C TRACH DNA SPEC QL NAA+PROBE: NEGATIVE
N GONORRHOEA DNA SPEC QL NAA+PROBE: NEGATIVE

## 2023-12-09 NOTE — TELEPHONE ENCOUNTER
Answering Service Page:     Started having a few symptoms on Monday, increased allergies suspected. She was seen today in the clinic and tested positive for Influenza A. Reports majority of symptoms started yesterday. She would like Tamiflu and Tylenol/robitussin for symptom management.     She is also requesting Tamiflu ppx for her 17 month old son.     Will send Tamiflu 75 mg BID x 5 days for patient. Will send ppx dosing for her son in his chart.     Elzbieta Ortega MD PGY-3

## 2024-01-23 ENCOUNTER — OFFICE VISIT (OUTPATIENT)
Dept: FAMILY MEDICINE | Facility: CLINIC | Age: 34
End: 2024-01-23
Payer: COMMERCIAL

## 2024-01-23 VITALS
BODY MASS INDEX: 20.48 KG/M2 | TEMPERATURE: 98.3 F | HEIGHT: 66 IN | OXYGEN SATURATION: 96 % | RESPIRATION RATE: 20 BRPM | HEART RATE: 97 BPM | DIASTOLIC BLOOD PRESSURE: 71 MMHG | SYSTOLIC BLOOD PRESSURE: 113 MMHG | WEIGHT: 127.4 LBS

## 2024-01-23 DIAGNOSIS — F33.1 MODERATE EPISODE OF RECURRENT MAJOR DEPRESSIVE DISORDER (H): Primary | ICD-10-CM

## 2024-01-23 DIAGNOSIS — K21.9 GASTROESOPHAGEAL REFLUX DISEASE WITHOUT ESOPHAGITIS: ICD-10-CM

## 2024-01-23 DIAGNOSIS — Z20.822 ENCOUNTER FOR LABORATORY TESTING FOR COVID-19 VIRUS: ICD-10-CM

## 2024-01-23 DIAGNOSIS — F51.01 PRIMARY INSOMNIA: ICD-10-CM

## 2024-01-23 PROCEDURE — 99214 OFFICE O/P EST MOD 30 MIN: CPT | Mod: GC

## 2024-01-23 PROCEDURE — 87635 SARS-COV-2 COVID-19 AMP PRB: CPT

## 2024-01-23 RX ORDER — PHENOL 1.4 %
10 AEROSOL, SPRAY (ML) MUCOUS MEMBRANE
Qty: 60 TABLET | Refills: 0 | Status: SHIPPED | OUTPATIENT
Start: 2024-01-23

## 2024-01-23 RX ORDER — HYDROXYZINE HYDROCHLORIDE 25 MG/1
25 TABLET, FILM COATED ORAL AT BEDTIME
Qty: 60 TABLET | Refills: 0 | Status: SHIPPED | OUTPATIENT
Start: 2024-01-23 | End: 2024-08-12

## 2024-01-23 RX ORDER — FAMOTIDINE 20 MG/1
20 TABLET, FILM COATED ORAL DAILY
Qty: 90 TABLET | Refills: 0 | Status: SHIPPED | OUTPATIENT
Start: 2024-01-23

## 2024-01-23 ASSESSMENT — PATIENT HEALTH QUESTIONNAIRE - PHQ9: SUM OF ALL RESPONSES TO PHQ QUESTIONS 1-9: 16
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
## 2024-01-24 LAB — SARS-COV-2 RNA RESP QL NAA+PROBE: NEGATIVE

## 2024-04-10 ENCOUNTER — OFFICE VISIT (OUTPATIENT)
Dept: FAMILY MEDICINE | Facility: CLINIC | Age: 34
End: 2024-04-10
Payer: COMMERCIAL

## 2024-04-10 VITALS
SYSTOLIC BLOOD PRESSURE: 115 MMHG | HEIGHT: 65 IN | WEIGHT: 130 LBS | BODY MASS INDEX: 21.66 KG/M2 | RESPIRATION RATE: 20 BRPM | DIASTOLIC BLOOD PRESSURE: 75 MMHG | TEMPERATURE: 98 F | OXYGEN SATURATION: 97 % | HEART RATE: 103 BPM

## 2024-04-10 DIAGNOSIS — H69.93 DYSFUNCTION OF BOTH EUSTACHIAN TUBES: ICD-10-CM

## 2024-04-10 DIAGNOSIS — Z01.818 PREOP GENERAL PHYSICAL EXAM: Primary | ICD-10-CM

## 2024-04-10 DIAGNOSIS — F33.1 MODERATE EPISODE OF RECURRENT MAJOR DEPRESSIVE DISORDER (H): ICD-10-CM

## 2024-04-10 PROCEDURE — 99214 OFFICE O/P EST MOD 30 MIN: CPT

## 2024-04-10 RX ORDER — FLUTICASONE PROPIONATE 50 MCG
1 SPRAY, SUSPENSION (ML) NASAL DAILY
Qty: 9.9 ML | Refills: 0 | Status: SHIPPED | OUTPATIENT
Start: 2024-04-10

## 2024-04-10 ASSESSMENT — PATIENT HEALTH QUESTIONNAIRE - PHQ9
10. IF YOU CHECKED OFF ANY PROBLEMS, HOW DIFFICULT HAVE THESE PROBLEMS MADE IT FOR YOU TO DO YOUR WORK, TAKE CARE OF THINGS AT HOME, OR GET ALONG WITH OTHER PEOPLE: NOT DIFFICULT AT ALL
SUM OF ALL RESPONSES TO PHQ QUESTIONS 1-9: 0
SUM OF ALL RESPONSES TO PHQ QUESTIONS 1-9: 0

## 2024-04-10 NOTE — PATIENT INSTRUCTIONS
Preparing for Your Surgery  Getting started  A nurse will call you to review your health history and instructions. They will give you an arrival time based on your scheduled surgery time. Please be ready to share:  Your doctor's clinic name and phone number  Your medical, surgical, and anesthesia history  A list of allergies and sensitivities  A list of medicines, including herbal treatments and over-the-counter drugs  Whether the patient has a legal guardian (ask how to send us the papers in advance)  Please tell us if you're pregnant--or if there's any chance you might be pregnant. Some surgeries may injure a fetus (unborn baby), so they require a pregnancy test. Surgeries that are safe for a fetus don't always need a test, and you can choose whether to have one.   If you have a child who's having surgery, please ask for a copy of Preparing for Your Child's Surgery.    Preparing for surgery  Within 10 to 30 days of surgery: Have a pre-op exam (sometimes called an H&P, or History and Physical). This can be done at a clinic or pre-operative center.  If you're having a , you may not need this exam. Talk to your care team.  At your pre-op exam, talk to your care team about all medicines you take. If you need to stop any medicines before surgery, ask when to start taking them again.  We do this for your safety. Many medicines can make you bleed too much during surgery. Some change how well surgery (anesthesia) drugs work.  Call your insurance company to let them know you're having surgery. (If you don't have insurance, call [phone].)  Call your clinic if there's any change in your health. This includes signs of a cold or flu (sore throat, runny nose, cough, rash, fever). It also includes a scrape or scratch near the surgery site.  If you have questions on the day of surgery, call your hospital or surgery center.  Eating and drinking guidelines  For your safety: Unless your surgeon tells you otherwise,  follow the guidelines below.  Eat and drink as usual until 8 hours before you arrive for surgery. After that, no food or milk.  Drink clear liquids until 2 hours before you arrive. These are liquids you can see through, like water, Gatorade, and Propel Water. They also include plain black coffee and tea (no cream or milk), candy, and breath mints. You can spit out gum when you arrive.  If you drink alcohol: Stop drinking it the night before surgery.  If your care team tells you to take medicine on the morning of surgery, it's okay to take it with a sip of water.  Preventing infection  Shower or bathe the night before and morning of your surgery. Follow the instructions your clinic gave you. (If no instructions, use regular soap.)  Don't shave or clip hair near your surgery site. We'll remove the hair if needed.  Don't smoke or vape the morning of surgery. You may chew nicotine gum up to 2 hours before surgery. A nicotine patch is okay.  Note: Some surgeries require you to completely quit smoking and nicotine. Check with your surgeon.  Your care team will make every effort to keep you safe from infection. We will:  Clean our hands often with soap and water (or an alcohol-based hand rub).  Clean the skin at your surgery site with a special soap that kills germs.  Give you a special gown to keep you warm. (Cold raises the risk of infection.)  Wear special hair covers, masks, gowns and gloves during surgery.  Give antibiotic medicine, if prescribed. Not all surgeries need antibiotics.  What to bring on the day of surgery  Photo ID and insurance card  Copy of your health care directive, if you have one  Glasses and hearing aids (bring cases)  You can't wear contacts during surgery  Inhaler and eye drops, if you use them (tell us about these when you arrive)  CPAP machine or breathing device, if you use them  A few personal items, if spending the night  If you have . . .  A pacemaker, ICD (cardiac defibrillator) or other  implant: Bring the ID card.  An implanted stimulator: Bring the remote control.  A legal guardian: Bring a copy of the certified (court-stamped) guardianship papers.  Please remove any jewelry, including body piercings. Leave jewelry and other valuables at home.  If you're going home the day of surgery  You must have a responsible adult drive you home. They should stay with you overnight as well.  If you don't have someone to stay with you, and you aren't safe to go home alone, we may keep you overnight. Insurance often won't pay for this.  After surgery  If it's hard to control your pain or you need more pain medicine, please call your surgeon's office.  Questions?   If you have any questions for your care team, list them here: _________________________________________________________________________________________________________________________________________________________________________ ____________________________________ ____________________________________ ____________________________________  For informational purposes only. Not to replace the advice of your health care provider. Copyright   2003, 2019 [employer]. All rights reserved. Clinically reviewed by Maye Osman MD. [employer] 229235 - REV 12/22.    How to Take Your Medication Before Surgery  - STOP taking all vitamins and herbal supplements 14 days before surgery.

## 2024-04-10 NOTE — PROGRESS NOTES
Preoperative Evaluation  [facility] [address] [place] [address]  Phone: [phone]  Fax: [phone]  Primary Provider: Hollis Alfredo  Pre-op Performing Provider: HOLLIS ALFREDO  Apr 10, 2024     Antonette is a 33 year old, presenting for the following:  Pre-Op Exam (Pre-op for surgery 4/30 for breast)        1/23/2024     1:13 PM   Additional Questions   Roomed by msy   Accompanied by sister     Surgical Information  Surgery/Procedure: Breast implants bilateral  Surgery Location: [facility]  Surgeon: Dr. Bhatti  Surgery Date: 4/30/2024  Time of Surgery: TBD  Where patient plans to recover: At home with family  Fax number for surgical facility: [phone]    Assessment & Plan     The proposed surgical procedure is considered LOW risk.    Preop general physical exam  Patient plan to have bilateral breast implants completed 4/30/2024.  No concerns.  -No identified additional risk factors other than previously addressed  -Do not take any ibuprofen 5 days before surgery.  -Discontinue vaping to help with healing after surgery. Most beneficial to discontinue prior to surgery and remain smoke free throughout healing process.    Moderate episode of recurrent major depressive disorder  Controlled.  PHQ-9 score today of 0.  Patient is meeting with therapist virtually as needed.  -Previously on sertraline 50 mg but currently not taking this.     Dysfunction of both eustachian tubes  Patient asymptomatic. Noted on physical examination.  - fluticasone (FLONASE) 50 MCG/ACT nasal spray   - Do not use 10 days before surgery or after surgery    Antiplatelet or Anticoagulation Medication Instructions  Patient is on no additional chronic medications    Recommendation  APPROVAL GIVEN to proceed with proposed procedure, without further diagnostic evaluation.    Subjective     HPI related to upcoming procedure: Patient is doing surgery due to small breast size  increasing.  She denies any pain or drainage.        4/10/2024    12:31 PM   Preop Questions   1. Have you ever had a heart attack or stroke? No   2. Have you ever had surgery on your heart or blood vessels, such as a stent placement, a coronary artery bypass, or surgery on an artery in your head, neck, heart, or legs? No   3. Do you have chest pain with activity? No   4. Do you have a history of  heart failure? No   5. Do you currently have a cold, bronchitis or symptoms of other infection? No   6. Do you have a cough, shortness of breath, or wheezing? No   7. Do you or anyone in your family have previous history of blood clots? No   8. Do you or does anyone in your family have a serious bleeding problem such as prolonged bleeding following surgeries or cuts? No   9. Have you ever had problems with anemia or been told to take iron pills? No   10. Have you had any abnormal blood loss such as black, tarry or bloody stools, or abnormal vaginal bleeding? No   11. Have you ever had a blood transfusion? No   12. Are you willing to have a blood transfusion if it is medically needed before, during, or after your surgery? Yes   13. Have you or any of your relatives ever had problems with anesthesia? No   14. Do you have sleep apnea, excessive snoring or daytime drowsiness? No   15. Do you have any artifical heart valves or other implanted medical devices like a pacemaker, defibrillator, or continuous glucose monitor? No   16. Do you have artificial joints? No   17. Are you allergic to latex? No   18. Is there any chance that you may be pregnant? No       Health Care Directive  Patient does not have a Health Care Directive or Living Will: Discussed advance care planning with patient; information given to patient to review.    Preoperative Review of    reviewed - no record of controlled substances prescribed.    Status of Chronic Conditions:  See problem list for active medical problems.  Problems all longstanding and  stable, except as noted/documented.  See ROS for pertinent symptoms related to these conditions.    Patient Active Problem List    Diagnosis Date Noted    Tetrahydrocannabinol (THC) use disorder, mild, abuse 01/20/2022     Priority: Medium     THC use in pregnancy: currently smoking THC once daily down from several times per day prior to pregnancy.          History of depression 01/20/2022     Priority: Medium     1/20/22 Reports h/o depression but states that she is okay right now.        H/O gonorrhea 06/28/2021     Priority: Medium     6/28/21 POSITIVE gonorrhea.   7/1/21 Seen in clinic and treated with Ceftriaxone 500 mg IM x's 1 dose.       Migraine without aura 08/06/2020     Priority: Medium      Past Medical History:   Diagnosis Date    Major depressive disorder, recurrent episode, moderate (H)      Past Surgical History:   Procedure Laterality Date    C/SECTION, LOW TRANSVERSE  2022    DILATION AND CURETTAGE       Current Outpatient Medications   Medication Sig Dispense Refill    fluticasone (FLONASE) 50 MCG/ACT nasal spray Spray 1 spray into both nostrils daily 9.9 mL 0    acetaminophen (TYLENOL) 500 MG tablet Take 1-2 tablets (500-1,000 mg) by mouth every 6 hours as needed for mild pain 30 tablet 1    famotidine (PEPCID) 20 MG tablet Take 1 tablet (20 mg) by mouth daily 90 tablet 0    guaiFENesin (ROBITUSSIN) 20 mg/mL liquid Take 10 mLs (200 mg) by mouth every 4 hours as needed for cough 236 mL 1    hydrOXYzine HCl (ATARAX) 25 MG tablet Take 1 tablet (25 mg) by mouth at bedtime 60 tablet 0    Melatonin 10 MG TABS tablet Take 1 tablet (10 mg) by mouth nightly as needed for sleep 60 tablet 0    sertraline (ZOLOFT) 50 MG tablet Take 1 tablet (50 mg) by mouth daily (Patient not taking: Reported on 4/10/2024) 90 tablet 0       Allergies   Allergen Reactions    Trimethoprim Itching, Nausea, Rash and Nausea and Vomiting        Social History     Tobacco Use    Smoking status: Former     Current packs/day: 0.00      Types: Cigarettes     Quit date: 2021     Years since quitting: 3.2    Smokeless tobacco: Never    Tobacco comments:     vape   Substance Use Topics    Alcohol use: Not Currently     Family History   Problem Relation Age of Onset    Diabetes Maternal Grandfather     No Known Problems Mother     No Known Problems Father     No Known Problems Maternal Grandmother     No Known Problems Paternal Grandmother     No Known Problems Paternal Grandfather     No Known Problems Brother     No Known Problems Sister     No Known Problems Son     No Known Problems Daughter     No Known Problems Maternal Half-Brother     No Known Problems Maternal Half-Sister     No Known Problems Paternal Half-Brother     No Known Problems Paternal Half-Sister     No Known Problems Niece     No Known Problems Nephew     No Known Problems Cousin     No Known Problems Other     Heart Disease No family hx of     Cancer No family hx of     Coronary Artery Disease No family hx of     Hypertension No family hx of     Hyperlipidemia No family hx of     Cerebrovascular Disease No family hx of     Breast Cancer No family hx of     Colon Cancer No family hx of     Prostate Cancer No family hx of     Other Cancer No family hx of     Depression No family hx of     Anxiety Disorder No family hx of     Mental Illness No family hx of     Substance Abuse No family hx of     Anesthesia Reaction No family hx of     Asthma No family hx of     Osteoporosis No family hx of     Genetic Disorder No family hx of     Thyroid Disease No family hx of     Obesity No family hx of     Unknown/Adopted No family hx of     Kidney Disease No family hx of     Thrombosis No family hx of     Arthritis No family hx of     Cystic Fibrosis No family hx of     Early Death No family hx of     Coronary Artery Disease Early Onset No family hx of     Heart Failure No family hx of     Bleeding Diathesis No family hx of     Dementia No family hx of     Ovarian Cancer No family hx of      "Uterine Cancer No family hx of     Colorectal Cancer No family hx of     Pancreatic Cancer No family hx of     Lung Cancer No family hx of     Melanoma No family hx of     Autoimmune Disease No family hx of      History   Drug Use    Types: Marijuana         Review of Systems  ROS is negative other than what is listed under the HPI.    Objective    /75   Pulse 103   Temp 98  F (36.7  C) (Oral)   Resp 20   Ht 1.651 m (5' 5\")   Wt 59 kg (130 lb)   SpO2 97%   BMI 21.63 kg/m     Estimated body mass index is 21.63 kg/m  as calculated from the following:    Height as of this encounter: 1.651 m (5' 5\").    Weight as of this encounter: 59 kg (130 lb).    Physical Exam  GENERAL: alert and no distress  EYES: Eyes grossly normal to inspection, PERRL and conjunctivae and sclerae normal  HENT: ear canals normal, TM's mildly retracted bilaterally and slightly more erythematic on the right side, nose and mouth without ulcers or lesions  NECK: no adenopathy, no asymmetry, masses, or scars, normal palpation of the thyroid  RESP: lungs clear to auscultation - no rales, rhonchi or wheezes  CV: regular rate and rhythm, normal S1 S2, no S3 or S4, no murmur, click or rub, no peripheral edema  ABDOMEN: soft, nontender, no hepatosplenomegaly, no masses and bowel sounds normal  MS: no gross musculoskeletal defects noted, no edema  SKIN: no suspicious lesions or rashes, well healed  scar  NEURO: Normal strength and tone, mentation intact and speech normal  PSYCH: mentation appears normal, affect normal/bright    Answers submitted by the patient for this visit:  Patient Health Questionnaire (Submitted on 4/10/2024)  If you checked off any problems, how difficult have these problems made it for you to do your work, take care of things at home, or get along with other people?: Not difficult at all  PHQ9 TOTAL SCORE: 0    Recent Labs   Lab Test 22  1028 22  1601   HGB 11.6* 10.0*    244   NA  --  138 "   POTASSIUM  --  3.7   CR  --  0.61      Diagnostics  No labs were ordered during this visit.   No EKG required for low risk surgery (cataract, skin procedure, breast biopsy, etc).    Revised Cardiac Risk Index (RCRI)  The patient has the following serious cardiovascular risks for perioperative complications:   - No serious cardiac risks = 0 points     RCRI Interpretation: 0 points: Class I (very low risk - 0.4% complication rate)       Patient was staffed with supervising physician, Dr. Juvenal Altamirano.    Signed Electronically by: Lucia Jane MD  Copy of this evaluation report is provided to requesting physician.

## 2024-04-10 NOTE — LETTER
4/10/2024         RE: Antonette Horton  [address]  [place] [address]      Preoperative Evaluation  [facility] [address] [place] [address]  Phone: [phone]  Fax: [phone]  Primary Provider: Lucia Alfredo  Pre-op Performing Provider: LUCIA ALFREDO  Apr 10, 2024     Antonette is a 33 year old, presenting for the following:  Pre-Op Exam (Pre-op for surgery 4/30 for breast)        1/23/2024     1:13 PM   Additional Questions   Roomed by msy   Accompanied by sister     Surgical Information  Surgery/Procedure: Breast implants bilateral  Surgery Location: [facility]  Surgeon: Dr. Bhatti  Surgery Date: 4/30/2024  Time of Surgery: TBD  Where patient plans to recover: At home with family  Fax number for surgical facility: [phone]    Assessment & Plan    The proposed surgical procedure is considered LOW risk.    Preop general physical exam  Patient plan to have bilateral breast implants completed 4/30/2024.  No concerns.  -Do not take any ibuprofen 5 days before surgery.    Moderate episode of recurrent major depressive disorder  Patient previously on sertraline 50 mg.  Patient currently not taking this.  PHQ-9 score today of 0.  Patient is meeting with therapist as needed.  This is virtual.    Dysfunction of both eustachian tubes  Patient asymptomatic.  - fluticasone (FLONASE) 50 MCG/ACT nasal spray      - No identified additional risk factors other than previously addressed    Antiplatelet or Anticoagulation Medication Instructions    Additional Medication Instructions  Patient is on no additional chronic medications    Recommendation  APPROVAL GIVEN to proceed with proposed procedure, without further diagnostic evaluation.    Subjective    HPI related to upcoming procedure: Patient is doing surgery due to small breast size increasing.  She denies any pain or drainage.        4/10/2024    12:31 PM   Preop Questions   1. Have you  ever had a heart attack or stroke? No   2. Have you ever had surgery on your heart or blood vessels, such as a stent placement, a coronary artery bypass, or surgery on an artery in your head, neck, heart, or legs? No   3. Do you have chest pain with activity? No   4. Do you have a history of  heart failure? No   5. Do you currently have a cold, bronchitis or symptoms of other infection? No   6. Do you have a cough, shortness of breath, or wheezing? No   7. Do you or anyone in your family have previous history of blood clots? No   8. Do you or does anyone in your family have a serious bleeding problem such as prolonged bleeding following surgeries or cuts? No   9. Have you ever had problems with anemia or been told to take iron pills? No   10. Have you had any abnormal blood loss such as black, tarry or bloody stools, or abnormal vaginal bleeding? No   11. Have you ever had a blood transfusion? No   12. Are you willing to have a blood transfusion if it is medically needed before, during, or after your surgery? Yes   13. Have you or any of your relatives ever had problems with anesthesia? No   14. Do you have sleep apnea, excessive snoring or daytime drowsiness? No   15. Do you have any artifical heart valves or other implanted medical devices like a pacemaker, defibrillator, or continuous glucose monitor? No   16. Do you have artificial joints? No   17. Are you allergic to latex? No   18. Is there any chance that you may be pregnant? No       Health Care Directive  Patient does not have a Health Care Directive or Living Will: Discussed advance care planning with patient; information given to patient to review.    Preoperative Review of    reviewed - no record of controlled substances prescribed.    Status of Chronic Conditions:  See problem list for active medical problems.  Problems all longstanding and stable, except as noted/documented.  See ROS for pertinent symptoms related to these conditions.    Patient  Active Problem List    Diagnosis Date Noted     Tetrahydrocannabinol (THC) use disorder, mild, abuse 01/20/2022     Priority: Medium     THC use in pregnancy: currently smoking THC once daily down from several times per day prior to pregnancy.           History of depression 01/20/2022     Priority: Medium     1/20/22 Reports h/o depression but states that she is okay right now.         H/O gonorrhea 06/28/2021     Priority: Medium     6/28/21 POSITIVE gonorrhea.   7/1/21 Seen in clinic and treated with Ceftriaxone 500 mg IM x's 1 dose.        Migraine without aura 08/06/2020     Priority: Medium      Past Medical History:   Diagnosis Date     Major depressive disorder, recurrent episode, moderate (H)      Past Surgical History:   Procedure Laterality Date     C/SECTION, LOW TRANSVERSE  2022     DILATION AND CURETTAGE       Current Outpatient Medications   Medication Sig Dispense Refill     fluticasone (FLONASE) 50 MCG/ACT nasal spray Spray 1 spray into both nostrils daily 9.9 mL 0     acetaminophen (TYLENOL) 500 MG tablet Take 1-2 tablets (500-1,000 mg) by mouth every 6 hours as needed for mild pain 30 tablet 1     famotidine (PEPCID) 20 MG tablet Take 1 tablet (20 mg) by mouth daily 90 tablet 0     guaiFENesin (ROBITUSSIN) 20 mg/mL liquid Take 10 mLs (200 mg) by mouth every 4 hours as needed for cough 236 mL 1     hydrOXYzine HCl (ATARAX) 25 MG tablet Take 1 tablet (25 mg) by mouth at bedtime 60 tablet 0     Melatonin 10 MG TABS tablet Take 1 tablet (10 mg) by mouth nightly as needed for sleep 60 tablet 0     sertraline (ZOLOFT) 50 MG tablet Take 1 tablet (50 mg) by mouth daily (Patient not taking: Reported on 4/10/2024) 90 tablet 0       Allergies   Allergen Reactions     Trimethoprim Itching, Nausea, Rash and Nausea and Vomiting        Social History     Tobacco Use     Smoking status: Former     Current packs/day: 0.00     Types: Cigarettes     Quit date: 2021     Years since quitting: 3.2     Smokeless  tobacco: Never     Tobacco comments:     vape   Substance Use Topics     Alcohol use: Not Currently     Family History   Problem Relation Age of Onset     Diabetes Maternal Grandfather      No Known Problems Mother      No Known Problems Father      No Known Problems Maternal Grandmother      No Known Problems Paternal Grandmother      No Known Problems Paternal Grandfather      No Known Problems Brother      No Known Problems Sister      No Known Problems Son      No Known Problems Daughter      No Known Problems Maternal Half-Brother      No Known Problems Maternal Half-Sister      No Known Problems Paternal Half-Brother      No Known Problems Paternal Half-Sister      No Known Problems Niece      No Known Problems Nephew      No Known Problems Cousin      No Known Problems Other      Heart Disease No family hx of      Cancer No family hx of      Coronary Artery Disease No family hx of      Hypertension No family hx of      Hyperlipidemia No family hx of      Cerebrovascular Disease No family hx of      Breast Cancer No family hx of      Colon Cancer No family hx of      Prostate Cancer No family hx of      Other Cancer No family hx of      Depression No family hx of      Anxiety Disorder No family hx of      Mental Illness No family hx of      Substance Abuse No family hx of      Anesthesia Reaction No family hx of      Asthma No family hx of      Osteoporosis No family hx of      Genetic Disorder No family hx of      Thyroid Disease No family hx of      Obesity No family hx of      Unknown/Adopted No family hx of      Kidney Disease No family hx of      Thrombosis No family hx of      Arthritis No family hx of      Cystic Fibrosis No family hx of      Early Death No family hx of      Coronary Artery Disease Early Onset No family hx of      Heart Failure No family hx of      Bleeding Diathesis No family hx of      Dementia No family hx of      Ovarian Cancer No family hx of      Uterine Cancer No family hx of   "    Colorectal Cancer No family hx of      Pancreatic Cancer No family hx of      Lung Cancer No family hx of      Melanoma No family hx of      Autoimmune Disease No family hx of      History   Drug Use     Types: Marijuana         Review of Systems  ROS is negative other than what is listed under the HPI.    Objective   /75   Pulse 103   Temp 98  F (36.7  C) (Oral)   Resp 20   Ht 1.651 m (5' 5\")   Wt 59 kg (130 lb)   SpO2 97%   BMI 21.63 kg/m     Estimated body mass index is 21.63 kg/m  as calculated from the following:    Height as of this encounter: 1.651 m (5' 5\").    Weight as of this encounter: 59 kg (130 lb).    Physical Exam  GENERAL: alert and no distress  EYES: Eyes grossly normal to inspection, PERRL and conjunctivae and sclerae normal  HENT: ear canals normal, TM's mildly retracted bilaterally and slightly more erythematic on the right side, nose and mouth without ulcers or lesions  NECK: no adenopathy, no asymmetry, masses, or scars, normal palpation of the thyroid  RESP: lungs clear to auscultation - no rales, rhonchi or wheezes  CV: regular rate and rhythm, normal S1 S2, no S3 or S4, no murmur, click or rub, no peripheral edema  ABDOMEN: soft, nontender, no hepatosplenomegaly, no masses and bowel sounds normal  MS: no gross musculoskeletal defects noted, no edema  SKIN: no suspicious lesions or rashes, well healed  scar  NEURO: Normal strength and tone, mentation intact and speech normal  PSYCH: mentation appears normal, affect normal/bright    Answers submitted by the patient for this visit:  Patient Health Questionnaire (Submitted on 4/10/2024)  If you checked off any problems, how difficult have these problems made it for you to do your work, take care of things at home, or get along with other people?: Not difficult at all  PHQ9 TOTAL SCORE: 0    Recent Labs   Lab Test 22  1028 22  1601   HGB 11.6* 10.0*    244   NA  --  138   POTASSIUM  --  3.7   CR  --  " 0.61      Diagnostics  No labs were ordered during this visit.   No EKG required for low risk surgery (cataract, skin procedure, breast biopsy, etc).    Revised Cardiac Risk Index (RCRI)  The patient has the following serious cardiovascular risks for perioperative complications:   - No serious cardiac risks = 0 points     RCRI Interpretation: 0 points: Class I (very low risk - 0.4% complication rate)       Patient was staffed with supervising physician, Dr. Juvenal Altamirano.    Signed Electronically by: Lucia Jane MD  Copy of this evaluation report is provided to requesting physician.      Lucia Jane MD PGY3  [facility]

## 2024-04-29 ENCOUNTER — PREP FOR PROCEDURE (OUTPATIENT)
Dept: SURGERY | Facility: CLINIC | Age: 34
End: 2024-04-29
Payer: COMMERCIAL

## 2024-04-29 ENCOUNTER — ANESTHESIA EVENT (OUTPATIENT)
Dept: SURGERY | Facility: AMBULATORY SURGERY CENTER | Age: 34
End: 2024-04-29

## 2024-04-29 DIAGNOSIS — G89.18 POST-OP PAIN: Primary | ICD-10-CM

## 2024-04-29 DIAGNOSIS — R11.2 POST-OPERATIVE NAUSEA AND VOMITING: ICD-10-CM

## 2024-04-29 DIAGNOSIS — Z98.890 POST-OPERATIVE NAUSEA AND VOMITING: ICD-10-CM

## 2024-04-29 RX ORDER — ONDANSETRON 4 MG/1
8 TABLET, ORALLY DISINTEGRATING ORAL EVERY 8 HOURS PRN
Qty: 4 TABLET | Refills: 0 | Status: SHIPPED | OUTPATIENT
Start: 2024-04-29

## 2024-04-29 RX ORDER — HYDROCODONE BITARTRATE AND ACETAMINOPHEN 5; 325 MG/1; MG/1
1-2 TABLET ORAL EVERY 4 HOURS PRN
Qty: 20 TABLET | Refills: 0 | Status: SHIPPED | OUTPATIENT
Start: 2024-04-29 | End: 2024-05-04

## 2024-04-30 ENCOUNTER — HOSPITAL ENCOUNTER (OUTPATIENT)
Facility: AMBULATORY SURGERY CENTER | Age: 34
Discharge: HOME | End: 2024-04-30
Attending: PLASTIC SURGERY
Payer: COMMERCIAL

## 2024-04-30 ENCOUNTER — ANESTHESIA (OUTPATIENT)
Dept: SURGERY | Facility: AMBULATORY SURGERY CENTER | Age: 34
End: 2024-04-30

## 2024-04-30 VITALS
SYSTOLIC BLOOD PRESSURE: 118 MMHG | BODY MASS INDEX: 21.63 KG/M2 | RESPIRATION RATE: 14 BRPM | TEMPERATURE: 97.8 F | DIASTOLIC BLOOD PRESSURE: 68 MMHG | OXYGEN SATURATION: 100 % | WEIGHT: 130 LBS

## 2024-04-30 LAB — HCG UR QL: NEGATIVE

## 2024-04-30 PROCEDURE — 81025 URINE PREGNANCY TEST: CPT | Performed by: ANESTHESIOLOGY

## 2024-04-30 PROCEDURE — G8916 PT W IV AB GIVEN ON TIME: HCPCS

## 2024-04-30 PROCEDURE — L8600 IMPLANT BREAST SILICONE/EQ: HCPCS

## 2024-04-30 PROCEDURE — 360N000052 HC SURGERY LEVEL COSMETIC 60 MIN

## 2024-04-30 PROCEDURE — 15771 GRFG AUTOL FAT LIPO 50 CC/<: CPT | Performed by: ANESTHESIOLOGY

## 2024-04-30 PROCEDURE — 15771 GRFG AUTOL FAT LIPO 50 CC/<: CPT | Performed by: NURSE ANESTHETIST, CERTIFIED REGISTERED

## 2024-04-30 PROCEDURE — 360N000058 HC SURGERY LEVEL COSMETIC EACH ADDL 30 MIN OVER QUOTE ESTIMATE

## 2024-04-30 PROCEDURE — G8907 PT DOC NO EVENTS ON DISCHARG: HCPCS

## 2024-04-30 DEVICE — IMPLANTABLE DEVICE: Type: IMPLANTABLE DEVICE | Site: BREAST | Status: FUNCTIONAL

## 2024-04-30 RX ORDER — LIDOCAINE 40 MG/G
CREAM TOPICAL
Status: DISCONTINUED | OUTPATIENT
Start: 2024-04-30 | End: 2024-05-01 | Stop reason: HOSPADM

## 2024-04-30 RX ORDER — ACETAMINOPHEN 325 MG/1
975 TABLET ORAL ONCE
Status: COMPLETED | OUTPATIENT
Start: 2024-04-30 | End: 2024-04-30

## 2024-04-30 RX ORDER — LIDOCAINE HYDROCHLORIDE 20 MG/ML
INJECTION, SOLUTION INFILTRATION; PERINEURAL PRN
Status: DISCONTINUED | OUTPATIENT
Start: 2024-04-30 | End: 2024-04-30

## 2024-04-30 RX ORDER — FENTANYL CITRATE 50 UG/ML
INJECTION, SOLUTION INTRAMUSCULAR; INTRAVENOUS PRN
Status: DISCONTINUED | OUTPATIENT
Start: 2024-04-30 | End: 2024-04-30

## 2024-04-30 RX ORDER — OXYCODONE HYDROCHLORIDE 5 MG/1
10 TABLET ORAL EVERY 4 HOURS PRN
Status: DISCONTINUED | OUTPATIENT
Start: 2024-04-30 | End: 2024-05-01 | Stop reason: HOSPADM

## 2024-04-30 RX ORDER — ONDANSETRON 2 MG/ML
4 INJECTION INTRAMUSCULAR; INTRAVENOUS EVERY 30 MIN PRN
Status: DISCONTINUED | OUTPATIENT
Start: 2024-04-30 | End: 2024-05-01 | Stop reason: HOSPADM

## 2024-04-30 RX ORDER — PROPOFOL 10 MG/ML
INJECTION, EMULSION INTRAVENOUS PRN
Status: DISCONTINUED | OUTPATIENT
Start: 2024-04-30 | End: 2024-04-30

## 2024-04-30 RX ORDER — EPINEPHRINE 1 MG/ML
INJECTION, SOLUTION, CONCENTRATE INTRAVENOUS PRN
Status: DISCONTINUED | OUTPATIENT
Start: 2024-04-30 | End: 2024-04-30 | Stop reason: HOSPADM

## 2024-04-30 RX ORDER — PROPOFOL 10 MG/ML
INJECTION, EMULSION INTRAVENOUS
Status: COMPLETED | OUTPATIENT
Start: 2024-04-30 | End: 2024-04-30

## 2024-04-30 RX ORDER — DEXAMETHASONE SODIUM PHOSPHATE 4 MG/ML
INJECTION, SOLUTION INTRA-ARTICULAR; INTRALESIONAL; INTRAMUSCULAR; INTRAVENOUS; SOFT TISSUE PRN
Status: DISCONTINUED | OUTPATIENT
Start: 2024-04-30 | End: 2024-04-30

## 2024-04-30 RX ORDER — FENTANYL CITRATE 50 UG/ML
25 INJECTION, SOLUTION INTRAMUSCULAR; INTRAVENOUS EVERY 5 MIN PRN
Status: DISCONTINUED | OUTPATIENT
Start: 2024-04-30 | End: 2024-05-01 | Stop reason: HOSPADM

## 2024-04-30 RX ORDER — SODIUM CHLORIDE, SODIUM LACTATE, POTASSIUM CHLORIDE, CALCIUM CHLORIDE 600; 310; 30; 20 MG/100ML; MG/100ML; MG/100ML; MG/100ML
INJECTION, SOLUTION INTRAVENOUS CONTINUOUS
Status: DISCONTINUED | OUTPATIENT
Start: 2024-04-30 | End: 2024-05-01 | Stop reason: HOSPADM

## 2024-04-30 RX ORDER — ONDANSETRON 4 MG/1
4 TABLET, ORALLY DISINTEGRATING ORAL EVERY 30 MIN PRN
Status: DISCONTINUED | OUTPATIENT
Start: 2024-04-30 | End: 2024-05-01 | Stop reason: HOSPADM

## 2024-04-30 RX ORDER — NALOXONE HYDROCHLORIDE 0.4 MG/ML
0.1 INJECTION, SOLUTION INTRAMUSCULAR; INTRAVENOUS; SUBCUTANEOUS
Status: DISCONTINUED | OUTPATIENT
Start: 2024-04-30 | End: 2024-05-01 | Stop reason: HOSPADM

## 2024-04-30 RX ORDER — HYDRALAZINE HYDROCHLORIDE 20 MG/ML
2.5-5 INJECTION INTRAMUSCULAR; INTRAVENOUS EVERY 10 MIN PRN
Status: DISCONTINUED | OUTPATIENT
Start: 2024-04-30 | End: 2024-05-01 | Stop reason: HOSPADM

## 2024-04-30 RX ORDER — CEFAZOLIN SODIUM 2 G/50ML
2 SOLUTION INTRAVENOUS
Status: COMPLETED | OUTPATIENT
Start: 2024-04-30 | End: 2024-04-30

## 2024-04-30 RX ORDER — METOPROLOL TARTRATE 1 MG/ML
1-2 INJECTION, SOLUTION INTRAVENOUS EVERY 5 MIN PRN
Status: DISCONTINUED | OUTPATIENT
Start: 2024-04-30 | End: 2024-05-01 | Stop reason: HOSPADM

## 2024-04-30 RX ORDER — DEXMEDETOMIDINE HYDROCHLORIDE 4 UG/ML
INJECTION, SOLUTION INTRAVENOUS PRN
Status: DISCONTINUED | OUTPATIENT
Start: 2024-04-30 | End: 2024-04-30

## 2024-04-30 RX ORDER — CEFAZOLIN SODIUM 2 G/50ML
2 SOLUTION INTRAVENOUS SEE ADMIN INSTRUCTIONS
Status: DISCONTINUED | OUTPATIENT
Start: 2024-04-30 | End: 2024-05-01 | Stop reason: HOSPADM

## 2024-04-30 RX ORDER — BUPIVACAINE HYDROCHLORIDE 2.5 MG/ML
INJECTION, SOLUTION INFILTRATION; PERINEURAL PRN
Status: DISCONTINUED | OUTPATIENT
Start: 2024-04-30 | End: 2024-04-30 | Stop reason: HOSPADM

## 2024-04-30 RX ORDER — FENTANYL CITRATE 50 UG/ML
50 INJECTION, SOLUTION INTRAMUSCULAR; INTRAVENOUS EVERY 5 MIN PRN
Status: DISCONTINUED | OUTPATIENT
Start: 2024-04-30 | End: 2024-05-01 | Stop reason: HOSPADM

## 2024-04-30 RX ORDER — FENTANYL CITRATE 50 UG/ML
25 INJECTION, SOLUTION INTRAMUSCULAR; INTRAVENOUS
Status: DISCONTINUED | OUTPATIENT
Start: 2024-04-30 | End: 2024-05-01 | Stop reason: HOSPADM

## 2024-04-30 RX ORDER — ONDANSETRON 2 MG/ML
INJECTION INTRAMUSCULAR; INTRAVENOUS PRN
Status: DISCONTINUED | OUTPATIENT
Start: 2024-04-30 | End: 2024-04-30

## 2024-04-30 RX ORDER — OXYCODONE HYDROCHLORIDE 5 MG/1
5 TABLET ORAL EVERY 4 HOURS PRN
Status: DISCONTINUED | OUTPATIENT
Start: 2024-04-30 | End: 2024-05-01 | Stop reason: HOSPADM

## 2024-04-30 RX ADMIN — LIDOCAINE HYDROCHLORIDE 60 MG: 20 INJECTION, SOLUTION INFILTRATION; PERINEURAL at 11:46

## 2024-04-30 RX ADMIN — DEXAMETHASONE SODIUM PHOSPHATE 4 MG: 4 INJECTION, SOLUTION INTRA-ARTICULAR; INTRALESIONAL; INTRAMUSCULAR; INTRAVENOUS; SOFT TISSUE at 11:46

## 2024-04-30 RX ADMIN — PROPOFOL 200 MG: 10 INJECTION, EMULSION INTRAVENOUS at 11:15

## 2024-04-30 RX ADMIN — CEFAZOLIN SODIUM 2 G: 2 SOLUTION INTRAVENOUS at 11:40

## 2024-04-30 RX ADMIN — FENTANYL CITRATE 50 MCG: 50 INJECTION, SOLUTION INTRAMUSCULAR; INTRAVENOUS at 11:59

## 2024-04-30 RX ADMIN — PROPOFOL 200 MG: 10 INJECTION, EMULSION INTRAVENOUS at 11:46

## 2024-04-30 RX ADMIN — SODIUM CHLORIDE, SODIUM LACTATE, POTASSIUM CHLORIDE, CALCIUM CHLORIDE: 600; 310; 30; 20 INJECTION, SOLUTION INTRAVENOUS at 10:09

## 2024-04-30 RX ADMIN — FENTANYL CITRATE 50 MCG: 50 INJECTION, SOLUTION INTRAMUSCULAR; INTRAVENOUS at 12:15

## 2024-04-30 RX ADMIN — ONDANSETRON 4 MG: 2 INJECTION INTRAMUSCULAR; INTRAVENOUS at 12:18

## 2024-04-30 RX ADMIN — PROPOFOL 100 MCG/KG/MIN: 10 INJECTION, EMULSION INTRAVENOUS at 11:51

## 2024-04-30 RX ADMIN — DEXMEDETOMIDINE HYDROCHLORIDE 20 MCG: 4 INJECTION, SOLUTION INTRAVENOUS at 12:00

## 2024-04-30 RX ADMIN — ACETAMINOPHEN 975 MG: 325 TABLET ORAL at 09:49

## 2024-04-30 ASSESSMENT — LIFESTYLE VARIABLES: TOBACCO_USE: 0

## 2024-04-30 NOTE — ANESTHESIA PREPROCEDURE EVALUATION
Anesthesia Pre-Procedure Evaluation    Patient: Antonette Horton   MRN: [id number] : 1990        Procedure : Procedure(s):  Mammoplasty augmentation bilateral          Past Medical History:   Diagnosis Date     Major depressive disorder, recurrent episode, moderate (H)       Past Surgical History:   Procedure Laterality Date     C/SECTION, LOW TRANSVERSE       DILATION AND CURETTAGE        Allergies   Allergen Reactions     Trimethoprim Itching, Nausea, Rash and Nausea and Vomiting      Social History     Tobacco Use     Smoking status: Former     Current packs/day: 0.00     Types: Cigarettes     Quit date:      Years since quitting: 3.3     Smokeless tobacco: Never     Tobacco comments:     vape   Substance Use Topics     Alcohol use: Not Currently      Wt Readings from Last 1 Encounters:   24 59 kg (130 lb)        Anesthesia Evaluation   Pt has had prior anesthetic. Type: Regional.    No history of anesthetic complications       ROS/MED HX  ENT/Pulmonary: Comment: Vapes   (-) tobacco use   Neurologic:     (+)      migraines,                          Cardiovascular:  - neg cardiovascular ROS     METS/Exercise Tolerance:     Hematologic:  - neg hematologic  ROS     Musculoskeletal:  - neg musculoskeletal ROS     GI/Hepatic:  - neg GI/hepatic ROS     Renal/Genitourinary:  - neg Renal ROS     Endo:  - neg endo ROS     Psychiatric/Substance Use:     (+) psychiatric history depression   Recreational drug usage: Cannabis.    Infectious Disease:  - neg infectious disease ROS     Malignancy:  - neg malignancy ROS     Other:  - neg other ROS          Physical Exam    Airway  airway exam normal           Respiratory Devices and Support         Dental       (+) Completely normal teeth      Cardiovascular   cardiovascular exam normal          Pulmonary   pulmonary exam normal            OUTSIDE LABS:  CBC:   Lab Results   Component Value Date    WBC 5.5 2022    WBC 10.7 2022    HGB 11.6 (L)  "09/30/2022    HGB 10.0 (L) 05/11/2022    HCT 37.8 09/30/2022    HCT 31.7 (L) 05/11/2022     09/30/2022     05/11/2022     BMP:   Lab Results   Component Value Date     05/11/2022     01/22/2019    POTASSIUM 3.7 05/11/2022    POTASSIUM 3.9 01/22/2019    CHLORIDE 107 05/11/2022    CHLORIDE 107 01/22/2019    CO2 23 05/11/2022    CO2 23.1 08/24/2018    BUN 3 (L) 05/11/2022    BUN 8 01/22/2019    CR 0.61 05/11/2022    CR 0.82 01/22/2019    GLC 78 05/11/2022    GLC 84 01/22/2019     COAGS: No results found for: \"PTT\", \"INR\", \"FIBR\"  POC:   Lab Results   Component Value Date    HCG Negative 04/30/2024     HEPATIC:   Lab Results   Component Value Date    ALBUMIN 4.0 01/22/2019    PROTTOTAL 7.2 01/22/2019    ALT <15 08/24/2018    AST 15.6 08/24/2018    ALKPHOS 48.0 08/24/2018    BILITOTAL 0.3 01/22/2019     OTHER:   Lab Results   Component Value Date    MARILYN 8.9 05/11/2022    CRP <0.1 07/21/2017    SED 2 07/21/2017       Anesthesia Plan    ASA Status:  2    NPO Status:  NPO Appropriate    Anesthesia Type: General.     - Airway: LMA   Induction: Intravenous, Propofol.   Maintenance: Balanced.        Consents    Anesthesia Plan(s) and associated risks, benefits, and realistic alternatives discussed. Questions answered and patient/representative(s) expressed understanding.     - Discussed:     - Discussed with:  Patient      - Extended Intubation/Ventilatory Support Discussed: No.      - Patient is DNR/DNI Status: No     Use of blood products discussed: No .     Postoperative Care    Pain management: IV analgesics, Oral pain medications.   PONV prophylaxis: Ondansetron (or other 5HT-3), Background Propofol Infusion, Dexamethasone or Solumedrol     Comments:               Prasanth Zee MD    I have reviewed the pertinent notes and labs in the chart from the past 30 days and (re)examined the patient.  Any updates or changes from those notes are reflected in this note.                  "

## 2024-04-30 NOTE — ANESTHESIA PROCEDURE NOTES
Airway       Patient location during procedure: OR       Procedure Start/Stop Times: 4/30/2024 11:15 AM  Staff -        Anesthesiologist:  Prasanth Zee MD       CRNA: Gardenia Burns APRN CRNA       Performed By: CRNA and anesthesiologistIndications and Patient Condition       Indications for airway management: spencer-procedural       Induction type:intravenous       Mask difficulty assessment: 1 - vent by mask    Final Airway Details       Final airway type: supraglottic airway    Supraglottic Airway Details        Type: LMA       Brand: Supreme       LMA size: 4       Cuff Pressure (cm H2O): 7       Airway Seal Pressure (cm H2O): 12    Post intubation assessment        Placement verified by: capnometry, equal breath sounds and chest rise        Number of attempts at approach: 1       Secured with: commercial tube bonilla       Ease of procedure: easy       Dentition: Intact and Unchanged    Medication(s) Administered   propofol (DIPRIVAN) injection 10 mg/mL vial - Intravenous   200 mg - 4/30/2024 11:15:00 AM  Medication Administration Time: 4/30/2024 11:15 AM

## 2024-04-30 NOTE — ANESTHESIA POSTPROCEDURE EVALUATION
Patient: Antonette Horton    Procedure: Procedure(s):  Mammoplasty augmentation bilateral       Anesthesia Type:  General    Note:  Anesthesia Post Evaluation    Last vitals:  Vitals Value Taken Time   /74 04/30/24 1335   Temp 98  F (36.7  C) 04/30/24 1335   Pulse 66 04/30/24 1340   Resp 13 04/30/24 1340   SpO2 100 % 04/30/24 1340   Vitals shown include unfiled device data.    Electronically Signed By: ANI Rodriges CRNA  April 30, 2024  1:41 PM

## 2024-04-30 NOTE — DISCHARGE INSTRUCTIONS
You were given 975mg Tylenol at 10am. You may take more Tylenol after 4pm. Do not take more than 4000mg of Tylenol in a 24 hour period.

## 2024-04-30 NOTE — ANESTHESIA CARE TRANSFER NOTE
Patient: Antonette Horton    Procedure: Procedure(s):  Mammoplasty augmentation bilateral       Diagnosis: Encounter for cosmetic surgery [Z41.1]  Diagnosis Additional Information: No value filed.    Anesthesia Type:   General     Note:      Level of Consciousness: awake  Oxygen Supplementation: face mask    Independent Airway: airway patency satisfactory and stable  Dentition: dentition unchanged      Patient transferred to: PACU    Handoff Report: Identifed the Patient, Identified the Reponsible Provider, Reviewed the pertinent medical history, Discussed the surgical course, Set expectations for post-procedure period, Reviewed Intra-OP anesthesia mangement and issues during anesthesia and Allowed opportunity for questions and acknowledgement of understanding      Vitals:  Vitals Value Taken Time   /56    Temp 36    Pulse 97 04/30/24 1339   Resp 11 04/30/24 1339   SpO2 100 % 04/30/24 1339   Vitals shown include unfiled device data.    Electronically Signed By: ANI Rodriges CRNA  April 30, 2024  1:41 PM

## 2024-04-30 NOTE — OP NOTE
Preoperative diagnosis: Bilateral relative symmetric micromastia  Postoperative diagnosis: Same  Procedure performed bilateral muscular breast augmentation  Findings: There is bilateral relative symmetric micromastia.  The patient has grade 1 ptosis.  No breast masses or other breast abnormalities.  Prior to the operative procedure the risks and benefits have been discussed with the patient she has been given a free choice to choose for 100 cc saline filled implant.  All of her questions have been answered and she wishes to proceed with surgery at this time.  Procedure: Following ministration of satisfactory general anesthesia the patient was patient was placed in the supine position and the chest was prepped and draped in usual sterile fashion.  Following procedure first carried out on the left breast and on the right breast.  A 2 and half centimeter inframammary incision was made dissection was carried down to the level of the pectoralis major muscle.  This was divided along 2 cm of its origin and elevated up and off the underlying chest wall.  Hemostasis was achieved with electrocoagulation.  A submuscular pocket was irrigated with saline followed by dilute antibiotic solution.  The implant was placed and inflated and the valve closed.  The subcutaneous tissues and skin were reapproximated with multiple erupted 3-0 PDS suture.  The skin was finally reapproximated with Dermabond.  The patient tolerated procedure well and was discharged recovery area in satisfactory condition.  Estimated blood loss is 25 cc.

## 2024-05-01 DIAGNOSIS — G89.18 POST-OP PAIN: Primary | ICD-10-CM

## 2024-05-01 RX ORDER — OXYCODONE HYDROCHLORIDE 5 MG/1
5 TABLET ORAL EVERY 6 HOURS PRN
Qty: 6 TABLET | Refills: 0 | Status: SHIPPED | OUTPATIENT
Start: 2024-05-01 | End: 2024-05-04

## 2024-05-01 RX ORDER — OXYCODONE AND ACETAMINOPHEN 7.5; 325 MG/1; MG/1
1 TABLET ORAL EVERY 4 HOURS PRN
Status: DISCONTINUED | OUTPATIENT
Start: 2024-05-01 | End: 2024-05-01 | Stop reason: HOSPADM

## 2024-08-12 ENCOUNTER — OFFICE VISIT (OUTPATIENT)
Dept: FAMILY MEDICINE | Facility: CLINIC | Age: 34
End: 2024-08-12
Payer: COMMERCIAL

## 2024-08-12 VITALS
SYSTOLIC BLOOD PRESSURE: 120 MMHG | TEMPERATURE: 98.4 F | OXYGEN SATURATION: 100 % | DIASTOLIC BLOOD PRESSURE: 80 MMHG | BODY MASS INDEX: 21.17 KG/M2 | HEART RATE: 70 BPM | WEIGHT: 127.2 LBS | RESPIRATION RATE: 20 BRPM

## 2024-08-12 DIAGNOSIS — N30.01 ACUTE CYSTITIS WITH HEMATURIA: Primary | ICD-10-CM

## 2024-08-12 DIAGNOSIS — F33.1 MODERATE EPISODE OF RECURRENT MAJOR DEPRESSIVE DISORDER (H): ICD-10-CM

## 2024-08-12 DIAGNOSIS — Z11.3 ROUTINE SCREENING FOR STI (SEXUALLY TRANSMITTED INFECTION): ICD-10-CM

## 2024-08-12 LAB
ALBUMIN UR-MCNC: NEGATIVE MG/DL
APPEARANCE UR: CLEAR
BACTERIA #/AREA URNS HPF: ABNORMAL /HPF
BILIRUB UR QL STRIP: NEGATIVE
CLUE CELLS: ABNORMAL
COLOR UR AUTO: YELLOW
GLUCOSE UR STRIP-MCNC: NEGATIVE MG/DL
HGB UR QL STRIP: ABNORMAL
HIV 1+2 AB+HIV1 P24 AG SERPL QL IA: NONREACTIVE
KETONES UR STRIP-MCNC: NEGATIVE MG/DL
LEUKOCYTE ESTERASE UR QL STRIP: NEGATIVE
MUCOUS THREADS #/AREA URNS LPF: PRESENT /LPF
NITRATE UR QL: NEGATIVE
PH UR STRIP: 5.5 [PH] (ref 5–8)
RBC #/AREA URNS AUTO: ABNORMAL /HPF
SP GR UR STRIP: >=1.03 (ref 1–1.03)
SQUAMOUS #/AREA URNS AUTO: ABNORMAL /LPF
T PALLIDUM AB SER QL: NONREACTIVE
TRICHOMONAS, WET PREP: ABNORMAL
UROBILINOGEN UR STRIP-ACNC: 0.2 E.U./DL
WBC #/AREA URNS AUTO: ABNORMAL /HPF
WBC'S/HIGH POWER FIELD, WET PREP: ABNORMAL
YEAST, WET PREP: ABNORMAL

## 2024-08-12 PROCEDURE — 86780 TREPONEMA PALLIDUM: CPT

## 2024-08-12 PROCEDURE — 36415 COLL VENOUS BLD VENIPUNCTURE: CPT

## 2024-08-12 PROCEDURE — 87086 URINE CULTURE/COLONY COUNT: CPT

## 2024-08-12 PROCEDURE — 87389 HIV-1 AG W/HIV-1&-2 AB AG IA: CPT

## 2024-08-12 PROCEDURE — 81001 URINALYSIS AUTO W/SCOPE: CPT

## 2024-08-12 PROCEDURE — 99214 OFFICE O/P EST MOD 30 MIN: CPT | Mod: GC

## 2024-08-12 PROCEDURE — 87088 URINE BACTERIA CULTURE: CPT

## 2024-08-12 PROCEDURE — 87491 CHLMYD TRACH DNA AMP PROBE: CPT

## 2024-08-12 PROCEDURE — 87210 SMEAR WET MOUNT SALINE/INK: CPT

## 2024-08-12 PROCEDURE — 87591 N.GONORRHOEAE DNA AMP PROB: CPT

## 2024-08-12 RX ORDER — CEPHALEXIN 500 MG/1
500 CAPSULE ORAL 2 TIMES DAILY
Qty: 10 CAPSULE | Refills: 0 | Status: SHIPPED | OUTPATIENT
Start: 2024-08-12 | End: 2024-08-17

## 2024-08-12 RX ORDER — HYDROXYZINE HYDROCHLORIDE 25 MG/1
25 TABLET, FILM COATED ORAL AT BEDTIME
Qty: 60 TABLET | Refills: 0 | Status: SHIPPED | OUTPATIENT
Start: 2024-08-12

## 2024-08-12 ASSESSMENT — PATIENT HEALTH QUESTIONNAIRE - PHQ9: SUM OF ALL RESPONSES TO PHQ QUESTIONS 1-9: 3

## 2024-08-12 NOTE — PROGRESS NOTES
Preceptor attestation:  Vital signs reviewed: /80   Pulse 70   Temp 98.4  F (36.9  C) (Oral)   Resp 20   Wt 57.7 kg (127 lb 3.2 oz)   SpO2 100%   BMI 21.17 kg/m      Patient seen, evaluated, and discussed with the resident.  I verified the content of the note, which accurately reflects my assessment of the patient and the plan of care.    Supervising physician: Pretty Burnham MD  [facility]

## 2024-08-12 NOTE — PROGRESS NOTES
Assessment & Plan     Acute cystitis with hematuria  Keflex was effective and well tolerated for previous UTI with similar presentation. 5 day course prescribed.  - Urine Macroscopic with reflex to Microscopic  - cephALEXin (KEFLEX) 500 MG capsule  Dispense: 10 capsule; Refill: 0  - Urine Macroscopic with reflex to Microscopic  - Urine Microscopic Exam  - Urine Culture    Routine screening for STI (sexually transmitted infection)  Per patient request  - Chlamydia trachomatis PCR  - Neisseria gonorrhoeae PCR  - Treponema Abs w Reflex to RPR and Titer  - Wet preparation  - HIV Antigen Antibody Combo Cascade    Moderate episode of recurrent major depressive disorder (H)  - sertraline (ZOLOFT) 50 MG tablet  Dispense: 90 tablet; Refill: 0  - hydrOXYzine HCl (ATARAX) 25 MG tablet  Dispense: 60 tablet; Refill: 0    Return if symptoms worsen or fail to improve.    Raffaele Whitman is a 34 year old, presenting for the following health issues:  STD and UTI (Cramping, feel bloating and lower back pain )        8/12/2024    10:19 AM   Additional Questions   Roomed by msy   Accompanied by self         8/12/2024    Information    services provided? No        HPI   33 yo F c/o ~few days bloating, low back pain, abdominal cramps, decreased appetite, urgency, dysuria, and blood in urine. She has had a Nexplanon for ~1 year and no menstrual bleeding so high confidence that this is not menstrual blood.       Objective    /80   Pulse 70   Temp 98.4  F (36.9  C) (Oral)   Resp 20   Wt 57.7 kg (127 lb 3.2 oz)   SpO2 100%   BMI 21.17 kg/m    Body mass index is 21.17 kg/m .    Physical Exam   Constitutional: alert, no acute distress, pleasant and cooperative  Cardiovascular: extremities warm and well perfused, no peripheral edema  Respiratory: normal respiratory rate/rhythm/effort, speaks in complete sentences  Abdomen: Abdomen soft and without distension or tenderness. No palpable masses or  organomegaly.   Neuro: Alert and oriented, no focal deficits  Skin: no suspicious lesions or rashes   Psychiatric: mentation and affect normal, judgment and insight intact    Results for orders placed or performed in visit on 08/12/24 (from the past 24 hour(s))   Wet preparation    Specimen: Vagina; Swab   Result Value Ref Range    Trichomonas Absent Absent    Yeast Absent Absent    Clue Cells Absent Absent    WBCs/high power field 1+ (A) None    Narrative    Few bacteria; negative odor    Urine Macroscopic with reflex to Microscopic   Result Value Ref Range    Color Urine Yellow Colorless, Straw, Light Yellow, Yellow    Appearance Urine Clear Clear    Glucose Urine Negative Negative mg/dL    Bilirubin Urine Negative Negative    Ketones Urine Negative Negative mg/dL    Specific Gravity Urine >=1.030 1.005 - 1.030    Blood Urine Trace (A) Negative    pH Urine 5.5 5.0 - 8.0    Protein Albumin Urine Negative Negative mg/dL    Urobilinogen Urine 0.2 0.2, 1.0 E.U./dL    Nitrite Urine Negative Negative    Leukocyte Esterase Urine Negative Negative   Urine Microscopic Exam   Result Value Ref Range    Bacteria Urine Moderate (A) None Seen /HPF    RBC Urine None Seen 0-2 /HPF /HPF    WBC Urine 0-5 0-5 /HPF /HPF    Squamous Epithelials Urine Few (A) None Seen /LPF    Mucus Urine Present (A) None Seen /LPF           Signed Electronically by: SHAAN NICOLE MD

## 2024-08-13 LAB
BACTERIA UR CULT: ABNORMAL
BACTERIA UR CULT: ABNORMAL
C TRACH DNA SPEC QL NAA+PROBE: NEGATIVE
N GONORRHOEA DNA SPEC QL NAA+PROBE: NEGATIVE

## 2024-11-16 DIAGNOSIS — F33.1 MODERATE EPISODE OF RECURRENT MAJOR DEPRESSIVE DISORDER (H): ICD-10-CM

## 2024-11-18 NOTE — TELEPHONE ENCOUNTER
Name from pharmacy: SERTRALINE 50MG TABLETS         Will file in chart as: sertraline (ZOLOFT) 50 MG tablet    Sig: TAKE 1 TABLET(50 MG) BY MOUTH DAILY    Disp: 90 tablet    Refills: 0 (Pharmacy requested: Not specified)    Start: 11/16/2024    Class: E-Prescribe    Non-formulary For: Moderate episode of recurrent major depressive disorder (H)    Last ordered: 3 months ago (8/12/2024) by Pretty Burnham MD    Last refill: 8/12/2024    Rx #: [id number]    SSRIs Protocol [id number]/16/2024 11:13 AM   Protocol Details PHQ-9 score less than 5 in past 6 months    Medication is active on med list    Recent (12 mo) or future (90 days) visit within the authorizing provider's specialty    Medication indicated for associated diagnosis    Patient is age 18 or older    No active pregnancy on record    No positive pregnancy test in last 12 months               1/23/2024     1:16 PM 4/10/2024    12:30 PM 8/12/2024    10:21 AM   PHQ   PHQ-9 Total Score 16 0 3   Q9: Thoughts of better off dead/self-harm past 2 weeks Not at all Not at all  Not at all       Patient-reported          Prescription approved per [facility] Refill Protocol.    Miah Carrillo, RN, MSN

## 2025-01-04 ENCOUNTER — HEALTH MAINTENANCE LETTER (OUTPATIENT)
Age: 35
End: 2025-01-04

## 2025-02-13 ENCOUNTER — OFFICE VISIT (OUTPATIENT)
Dept: FAMILY MEDICINE | Facility: CLINIC | Age: 35
End: 2025-02-13
Payer: COMMERCIAL

## 2025-02-13 VITALS
DIASTOLIC BLOOD PRESSURE: 75 MMHG | BODY MASS INDEX: 21.33 KG/M2 | WEIGHT: 128 LBS | HEIGHT: 65 IN | TEMPERATURE: 98.8 F | SYSTOLIC BLOOD PRESSURE: 117 MMHG | RESPIRATION RATE: 18 BRPM | HEART RATE: 60 BPM | OXYGEN SATURATION: 100 %

## 2025-02-13 DIAGNOSIS — R39.15 URINARY URGENCY: ICD-10-CM

## 2025-02-13 DIAGNOSIS — Z11.3 SCREEN FOR STD (SEXUALLY TRANSMITTED DISEASE): ICD-10-CM

## 2025-02-13 DIAGNOSIS — N89.8 VAGINAL DISCHARGE: Primary | ICD-10-CM

## 2025-02-13 LAB
ALBUMIN UR-MCNC: ABNORMAL MG/DL
APPEARANCE UR: CLEAR
BACTERIA #/AREA URNS HPF: ABNORMAL /HPF
BILIRUB UR QL STRIP: NEGATIVE
CLUE CELLS: ABNORMAL
COLOR UR AUTO: YELLOW
GLUCOSE UR STRIP-MCNC: NEGATIVE MG/DL
HGB UR QL STRIP: ABNORMAL
KETONES UR STRIP-MCNC: NEGATIVE MG/DL
LEUKOCYTE ESTERASE UR QL STRIP: NEGATIVE
MUCOUS THREADS #/AREA URNS LPF: PRESENT /LPF
NITRATE UR QL: NEGATIVE
PH UR STRIP: 5.5 [PH] (ref 5–8)
RBC #/AREA URNS AUTO: ABNORMAL /HPF
SP GR UR STRIP: >=1.03 (ref 1–1.03)
SQUAMOUS #/AREA URNS AUTO: ABNORMAL /LPF
TRICHOMONAS, WET PREP: ABNORMAL
UROBILINOGEN UR STRIP-ACNC: 0.2 E.U./DL
WBC #/AREA URNS AUTO: ABNORMAL /HPF
WBC'S/HIGH POWER FIELD, WET PREP: ABNORMAL
YEAST, WET PREP: ABNORMAL

## 2025-02-13 ASSESSMENT — PATIENT HEALTH QUESTIONNAIRE - PHQ9: SUM OF ALL RESPONSES TO PHQ QUESTIONS 1-9: 2

## 2025-02-13 NOTE — PROGRESS NOTES
Assessment & Plan     Vaginal discharge  Screen for STD (sexually transmitted disease)  Increased clear white vaginal discharge with history of recently treated yeast infection and patient also requesting STDs screening today.  Nexplanon in place since 2023.  Last Pap smear in 2021 reassuring.  Wet prep unremarkable.  Provided reassurance and discussed appropriate vaginal hygiene.  Other STD labs pending as below.  - Hepatitis B surface antigen  - Wet preparation  - Chlamydia trachomatis PCR  - Hepatitis B surface antigen  - Hepatitis C RNA, Quantitative by PCR with Confirmatory Reflex to Genotyping  - HIV Antigen Antibody Combo Cascade  - Neisseria gonorrhoeae PCR  - Treponema Abs w Reflex to RPR and Titer    Urinary urgency  Ongoing urinary urgency with mild lower abdominal bloating sensation with history of prior UTI and prior symptoms of UTI with normal culture.  Most recently treated UTI in December 2024 with Keflex with unclear culture results at that time. No concern for complicated UTI.  UA demonstrating moderate squamous epithelial cells and blood but no RBCs.  Will add on urine culture.  Patient to be contacted if urine culture demonstrating infection.  - Urine Macroscopic with reflex to Microscopic  - Urine culture    Return if symptoms worsen or fail to improve.    Raffaele Whitman is a 34 year old, presenting for the following health issues:  Bloated (Feel bloated after eating) and STD (Follow up std, still have some discharge)    HPI     Patient presents with concerns regarding lower abdominal cramping and urinary urgency in addition to increased vaginal discharge.  Patient shares that she has a history of urinary tract infections and vaginal infections including recently treated yeast infection 1 month ago with topical medication.  She does share she has a remote history of gonorrhea previously.  She is sexually active with 1 male partner.  She has no concerns about STD specifically today but  "would like to get tested while she is here.  She endorses for the last few days lower abdominal cramping sensation with a sensation of urinary urgency.  No burning with urination.  No increased urinary frequency.  No blood in urine.  She also has had increased clear white vaginal discharge without an odor and without pruritus. No new products used in  area.      Objective    /75 (BP Location: Left arm, Patient Position: Sitting, Cuff Size: Adult Regular)   Pulse 60   Temp 98.8  F (37.1  C) (Oral)   Resp 18   Ht 1.651 m (5' 5\")   Wt 58.1 kg (128 lb)   SpO2 100%   BMI 21.30 kg/m    Body mass index is 21.3 kg/m .  Physical Exam   Constitutional: healthy, alert, no distress, and cooperative  Head: normocephalic  Cardiovascular: appears well perfused  Respiratory: breathing comfortably on RA  Musculoskeletal: extremities normal- no gross deformities noted, gait normal  Skin: no suspicious lesions or rashes on exposed skin  Neurologic: grossly normal CN  Psychiatric: mentation appears normal and affect normal      Signed Electronically by: Bella Chen MD  "

## 2025-02-13 NOTE — PROGRESS NOTES
"Preceptor attestation:  Vital signs reviewed: /75 (BP Location: Left arm, Patient Position: Sitting, Cuff Size: Adult Regular)   Pulse 60   Temp 98.8  F (37.1  C) (Oral)   Resp 18   Ht 1.651 m (5' 5\")   Wt 58.1 kg (128 lb)   SpO2 100%   BMI 21.30 kg/m      Patient seen, evaluated, and discussed with the resident.  I verified the content of the note, which accurately reflects my assessment of the patient and the plan of care.    Supervising physician: Pinky Tsai MD  [facility]      "

## 2025-02-15 LAB
BACTERIA UR CULT: NORMAL
HBV SURFACE AG SERPL QL IA: NONREACTIVE
HIV 1+2 AB+HIV1 P24 AG SERPL QL IA: NONREACTIVE

## 2025-04-20 ENCOUNTER — OFFICE VISIT (OUTPATIENT)
Dept: URGENT CARE | Facility: URGENT CARE | Age: 35
End: 2025-04-20
Payer: COMMERCIAL

## 2025-04-20 VITALS
BODY MASS INDEX: 22.05 KG/M2 | TEMPERATURE: 97.1 F | HEART RATE: 79 BPM | DIASTOLIC BLOOD PRESSURE: 74 MMHG | WEIGHT: 132.5 LBS | SYSTOLIC BLOOD PRESSURE: 109 MMHG | RESPIRATION RATE: 18 BRPM

## 2025-04-20 DIAGNOSIS — Z11.4 ENCOUNTER FOR SCREENING FOR HIV: ICD-10-CM

## 2025-04-20 DIAGNOSIS — Z11.59 NEED FOR HEPATITIS C SCREENING TEST: ICD-10-CM

## 2025-04-20 DIAGNOSIS — N89.8 VAGINAL ODOR: ICD-10-CM

## 2025-04-20 DIAGNOSIS — B96.89 BACTERIAL VAGINOSIS: ICD-10-CM

## 2025-04-20 DIAGNOSIS — Z11.3 SCREEN FOR STD (SEXUALLY TRANSMITTED DISEASE): ICD-10-CM

## 2025-04-20 DIAGNOSIS — N76.0 BACTERIAL VAGINOSIS: ICD-10-CM

## 2025-04-20 DIAGNOSIS — N92.6 IRREGULAR MENSES: Primary | ICD-10-CM

## 2025-04-20 LAB
BASOPHILS # BLD AUTO: 0 10E3/UL (ref 0–0.2)
BASOPHILS NFR BLD AUTO: 0 %
CLUE CELLS: PRESENT
EOSINOPHIL # BLD AUTO: 0.1 10E3/UL (ref 0–0.7)
EOSINOPHIL NFR BLD AUTO: 2 %
ERYTHROCYTE [DISTWIDTH] IN BLOOD BY AUTOMATED COUNT: 13.4 % (ref 10–15)
HCT VFR BLD AUTO: 37.2 % (ref 35–47)
HCV AB SERPL QL IA: NONREACTIVE
HGB BLD-MCNC: 11.4 G/DL (ref 11.7–15.7)
HIV 1+2 AB+HIV1 P24 AG SERPL QL IA: NONREACTIVE
IMM GRANULOCYTES # BLD: 0 10E3/UL
IMM GRANULOCYTES NFR BLD: 0 %
LYMPHOCYTES # BLD AUTO: 1.6 10E3/UL (ref 0.8–5.3)
LYMPHOCYTES NFR BLD AUTO: 31 %
MCH RBC QN AUTO: 23.2 PG (ref 26.5–33)
MCHC RBC AUTO-ENTMCNC: 30.6 G/DL (ref 31.5–36.5)
MCV RBC AUTO: 76 FL (ref 78–100)
MONOCYTES # BLD AUTO: 0.4 10E3/UL (ref 0–1.3)
MONOCYTES NFR BLD AUTO: 8 %
NEUTROPHILS # BLD AUTO: 3 10E3/UL (ref 1.6–8.3)
NEUTROPHILS NFR BLD AUTO: 59 %
PLATELET # BLD AUTO: 278 10E3/UL (ref 150–450)
RBC # BLD AUTO: 4.91 10E6/UL (ref 3.8–5.2)
TRICHOMONAS, WET PREP: ABNORMAL
WBC # BLD AUTO: 5.1 10E3/UL (ref 4–11)
WBC'S/HIGH POWER FIELD, WET PREP: ABNORMAL
YEAST, WET PREP: ABNORMAL

## 2025-04-20 PROCEDURE — 3074F SYST BP LT 130 MM HG: CPT | Performed by: FAMILY MEDICINE

## 2025-04-20 PROCEDURE — 86780 TREPONEMA PALLIDUM: CPT | Performed by: FAMILY MEDICINE

## 2025-04-20 PROCEDURE — 99214 OFFICE O/P EST MOD 30 MIN: CPT | Performed by: FAMILY MEDICINE

## 2025-04-20 PROCEDURE — 85025 COMPLETE CBC W/AUTO DIFF WBC: CPT | Performed by: FAMILY MEDICINE

## 2025-04-20 PROCEDURE — 87591 N.GONORRHOEAE DNA AMP PROB: CPT | Performed by: FAMILY MEDICINE

## 2025-04-20 PROCEDURE — 36415 COLL VENOUS BLD VENIPUNCTURE: CPT | Performed by: FAMILY MEDICINE

## 2025-04-20 PROCEDURE — 3078F DIAST BP <80 MM HG: CPT | Performed by: FAMILY MEDICINE

## 2025-04-20 PROCEDURE — 86803 HEPATITIS C AB TEST: CPT | Performed by: FAMILY MEDICINE

## 2025-04-20 PROCEDURE — 87491 CHLMYD TRACH DNA AMP PROBE: CPT | Performed by: FAMILY MEDICINE

## 2025-04-20 PROCEDURE — 87210 SMEAR WET MOUNT SALINE/INK: CPT | Performed by: FAMILY MEDICINE

## 2025-04-20 PROCEDURE — 87389 HIV-1 AG W/HIV-1&-2 AB AG IA: CPT | Performed by: FAMILY MEDICINE

## 2025-04-20 RX ORDER — METRONIDAZOLE 500 MG/1
500 TABLET ORAL 2 TIMES DAILY
Qty: 14 TABLET | Refills: 0 | Status: CANCELLED | OUTPATIENT
Start: 2025-04-20 | End: 2025-04-27

## 2025-04-20 RX ORDER — METRONIDAZOLE 7.5 MG/G
1 GEL VAGINAL DAILY
Qty: 70 G | Refills: 0 | Status: SHIPPED | OUTPATIENT
Start: 2025-04-20

## 2025-04-20 NOTE — PROGRESS NOTES
SUBJECTIVE:  Chief Complaint   Patient presents with    Urgent Care    Vaginal Bleeding     Has been bleeding for a month. Pt does have the nexplanon for 3 years. Having some vaginal odor smell for 2 weeks. Started with spots and now pt is constantly bleeding and filling up the pads     Antonette Horton is a 34 year old female who presents with a chief complaint of vaginal bleeding for 1 month.    Has Nexplanon and will spot irregularly, has Nexplanon in for about 3 years.  Did start spotting again and was not concerned but this has persisted which is unusual for her.    Would like to have STD screen also, noticed more vaginal odor.  Has new partner couple of months ago, did not use condom.    Past Medical History:   Diagnosis Date    Major depressive disorder, recurrent episode, moderate (H)      Current Outpatient Medications   Medication Sig Dispense Refill    sertraline (ZOLOFT) 50 MG tablet TAKE 1 TABLET(50 MG) BY MOUTH DAILY 90 tablet 0     Social History     Tobacco Use    Smoking status: Former     Current packs/day: 0.00     Types: Cigarettes     Quit date:      Years since quittin.3    Smokeless tobacco: Never    Tobacco comments:     vape   Substance Use Topics    Alcohol use: Not Currently       ROS:  Review of systems negative except as stated above.    EXAM:   /74   Pulse 79   Temp 97.1  F (36.2  C) (Tympanic)   Resp 18   Wt 60.1 kg (132 lb 8 oz)   LMP  (LMP Unknown)   BMI 22.05 kg/m    GENERAL APPEARANCE: healthy, alert and no distress  PSYCH:alert, affect bright    Results for orders placed or performed in visit on 25   CBC with platelets and differential     Status: Abnormal   Result Value Ref Range    WBC Count 5.1 4.0 - 11.0 10e3/uL    RBC Count 4.91 3.80 - 5.20 10e6/uL    Hemoglobin 11.4 (L) 11.7 - 15.7 g/dL    Hematocrit 37.2 35.0 - 47.0 %    MCV 76 (L) 78 - 100 fL    MCH 23.2 (L) 26.5 - 33.0 pg    MCHC 30.6 (L) 31.5 - 36.5 g/dL    RDW 13.4 10.0 - 15.0 %    Platelet Count  278 150 - 450 10e3/uL    % Neutrophils 59 %    % Lymphocytes 31 %    % Monocytes 8 %    % Eosinophils 2 %    % Basophils 0 %    % Immature Granulocytes 0 %    Absolute Neutrophils 3.0 1.6 - 8.3 10e3/uL    Absolute Lymphocytes 1.6 0.8 - 5.3 10e3/uL    Absolute Monocytes 0.4 0.0 - 1.3 10e3/uL    Absolute Eosinophils 0.1 0.0 - 0.7 10e3/uL    Absolute Basophils 0.0 0.0 - 0.2 10e3/uL    Absolute Immature Granulocytes 0.0 <=0.4 10e3/uL   Wet prep - Clinic Collect     Status: Abnormal    Specimen: Vagina; Swab   Result Value Ref Range    Trichomonas Absent Absent    Yeast Absent Absent    Clue Cells Present (A) Absent    WBCs/high power field 1+ (A) None   CBC with platelets and differential     Status: Abnormal    Narrative    The following orders were created for panel order CBC with platelets and differential.  Procedure                               Abnormality         Status                     ---------                               -----------         ------                     CBC with platelets and ...[[id number]]  Abnormal            Final result                 Please view results for these tests on the individual orders.           ASSESSMENT/PLAN:  (N92.6) Irregular menses  (primary encounter diagnosis)  Plan: CBC with platelets and differential            (N89.8) Vaginal odor  Comment:   Plan: Wet prep - Clinic Collect, Chlamydia         trachomatis/Neisseria gonorrhoeae by PCR -         Clinic Collect            (Z11.3) Screen for STD (sexually transmitted disease)  Plan: Chlamydia trachomatis/Neisseria gonorrhoeae by         PCR - Clinic Collect, Treponema Abs w Reflex to        RPR and Titer            (Z11.4) Encounter for screening for HIV  Plan: HIV Antigen Antibody Combo            (Z11.59) Need for hepatitis C screening test  Plan: Hepatitis C antibody            (N76.0,  B96.89) Bacterial vaginosis  Plan: metroNIDAZOLE (METROGEL) 0.75 % vaginal gel            Reviewed labs with patient, discussed  concern for prolong menses that will require further evaluation by primary provider or OB/GYN with possible need for ultrasound, this is outside  capability.  Reassurance given that hemoglobin is slightly low but not critical.      STD screen obtain and will follow up on result and notify of any abnormalities.  RX Metrogel given for BV treatment, reviewed that BV can recur frequently.    Follow up with primary provider for further evaluation in 1-2 weeks    Pool Haney MD  April 20, 2025 11:23 AM

## 2025-04-20 NOTE — PROGRESS NOTES
Urgent Care Clinic Visit    Chief Complaint   Patient presents with    Urgent Care    Vaginal Bleeding     Has been bleeding for a month. Pt does have the nexplanon for 3 years. Having some vaginal odor smell for 2 weeks. Started with spots and now pt is constantly bleeding and filling up the pads               4/20/2025    10:11 AM   Additional Questions   Roomed by Maribell   Accompanied by self     Pre-Provider Visit Orders - Vaginal Infection  Reason for visit:  Vaginal discharge

## 2025-04-21 LAB
C TRACH DNA SPEC QL PROBE+SIG AMP: NEGATIVE
N GONORRHOEA DNA SPEC QL NAA+PROBE: NEGATIVE
SPECIMEN TYPE: NORMAL
T PALLIDUM AB SER QL: NONREACTIVE

## 2025-05-28 ENCOUNTER — TELEPHONE (OUTPATIENT)
Dept: FAMILY MEDICINE | Facility: CLINIC | Age: 35
End: 2025-05-28
Payer: COMMERCIAL

## 2025-05-28 NOTE — TELEPHONE ENCOUNTER
Called pt and let her know that Dr Mcmahon would need her to make an appt to discuss migraines and the letter.  Assisted pt with scheduling an appt for tomorrow, 5/29/25 at 4:30 PM./My Keller RN BSN

## 2025-05-28 NOTE — TELEPHONE ENCOUNTER
Pt states that she was given a letter in the past stating that she needs to have dark tint on her car windows to prevent migraines.  She was pulled over for the tint and was told that she needs to have a new letter every year.  Pt is wondering if Dr Mcmahon could give her a new letter supporting the need for dark tint on her car windows?  She states that she saw eye doctor in the past and her glasses are fine.   Routed note to Dr Mcmahon./My Keller RN BSN     20 Letter supporting car window tint to prevent migraines:     [facility] [address] [place] [address]  Phone: [phone]  Fax: [phone]            2020        Antonette Horton  [address] [facility] [address]           To whom it concerns:     Antonette is a patient at this clinic.  She suffers from migraines which are triggered by bright sun light.  She finds that tinting the windows in her car protects her from right sunlight and prevents migraine attacks.  She finds the same benefit from wearing sunglasses when she is outdoors.  Feel free to contact us, with her permission, if you need any further information.     Sincerely,     Morelia Almanza MD  RE: Antonette Horton    MRN: [id number]   : 1990   ENC DATE: Aug 11, 2020   PAGE:

## 2025-05-29 ENCOUNTER — OFFICE VISIT (OUTPATIENT)
Dept: FAMILY MEDICINE | Facility: CLINIC | Age: 35
End: 2025-05-29
Payer: COMMERCIAL

## 2025-05-29 VITALS
HEART RATE: 83 BPM | WEIGHT: 130.4 LBS | DIASTOLIC BLOOD PRESSURE: 72 MMHG | BODY MASS INDEX: 21.73 KG/M2 | SYSTOLIC BLOOD PRESSURE: 106 MMHG | TEMPERATURE: 98.4 F | OXYGEN SATURATION: 99 % | HEIGHT: 65 IN | RESPIRATION RATE: 12 BRPM

## 2025-05-29 DIAGNOSIS — A64 STD (FEMALE): ICD-10-CM

## 2025-05-29 DIAGNOSIS — Z23 ENCOUNTER FOR IMMUNIZATION: Primary | ICD-10-CM

## 2025-05-29 PROCEDURE — 90471 IMMUNIZATION ADMIN: CPT | Performed by: FAMILY MEDICINE

## 2025-05-29 PROCEDURE — 99213 OFFICE O/P EST LOW 20 MIN: CPT | Mod: 25 | Performed by: FAMILY MEDICINE

## 2025-05-29 PROCEDURE — 3078F DIAST BP <80 MM HG: CPT | Performed by: FAMILY MEDICINE

## 2025-05-29 PROCEDURE — 90651 9VHPV VACCINE 2/3 DOSE IM: CPT | Performed by: FAMILY MEDICINE

## 2025-05-29 PROCEDURE — 3074F SYST BP LT 130 MM HG: CPT | Performed by: FAMILY MEDICINE

## 2025-05-29 NOTE — PROGRESS NOTES
Prior to immunization administration, verified patients identity using patient s name and date of birth. Please see Immunization Activity for additional information.     Screening Questionnaire for Adult Immunization    Are you sick today?   No   Do you have allergies to medications, food, a vaccine component or latex?   No   Have you ever had a serious reaction after receiving a vaccination?   No   Do you have a long-term health problem with heart, lung, kidney, or metabolic disease (e.g., diabetes), asthma, a blood disorder, no spleen, complement component deficiency, a cochlear implant, or a spinal fluid leak?  Are you on long-term aspirin therapy?   No   Do you have cancer, leukemia, HIV/AIDS, or any other immune system problem?   No   Do you have a parent, brother, or sister with an immune system problem?   No   In the past 3 months, have you taken medications that affect  your immune system, such as prednisone, other steroids, or anticancer drugs; drugs for the treatment of rheumatoid arthritis, Crohn s disease, or psoriasis; or have you had radiation treatments?   No   Have you had a seizure, or a brain or other nervous system problem?   No   During the past year, have you received a transfusion of blood or blood    products, or been given immune (gamma) globulin or antiviral drug?   No   For women: Are you pregnant or is there a chance you could become       pregnant during the next month?   No   Have you received any vaccinations in the past 4 weeks?   No     Immunization questionnaire answers were all negative.      Patient instructed to remain in clinic for 15 minutes afterwards, and to report any adverse reactions.     Screening performed by Emily Curtis CMA on 5/29/2025 at 5:04 PM.

## 2025-05-29 NOTE — LETTER
May 29, 2025      Antonette Horton  [address]  [facility] [address]        To whom it concerns,    Antonette Horton has a history of migraines which are worsened by exposure to bright light.  She finds that increased tinting of her car windows reduces her experience of migraines.      Sincerely,    Prasanth Mcmahon MD

## 2025-05-31 NOTE — PROGRESS NOTES
ASSESSMENT/PLAN:  Antonette was seen today for renewal letter.    Diagnoses and all orders for this visit:    Encounter for immunization  -     HPV9 (Gardasil 9 )    STD (female)  -     Chlamydia trachomatis/Neisseria gonorrhoeae by PCR - Clinic Collect  -     HIV Antigen Antibody Combo Cascade; Future  -     Treponema Abs w Reflex to RPR and Titer; Future    Other orders  -     HPV9 (GARDASIL 9)      Assessment & Plan  Encounter for immunization  - Write a letter for window tint exemption due to migraines.    STD (female)  - Provide a container for urine sample collection for proof of cure. Recommend retesting for STDs, including HIV and syphilis, around June 19, 2025.       Total visit time with patient was 15 mins, all of which was face to face MD time, and over 50% of this time was spent in counseling and coordination of care.  Including post-encounter documentation and orders on the date of service, total encounter time was 20 mins.          Subjective   Antonette is a 34 year old, presenting for the following health issues:  Renewal letter     History of Present Illness-Antonette Horton, a 34-year-old female, reports experiencing migraines that are triggered by sunlight, making it difficult for her to drive. She uses tinted windows in her car to help prevent these migraines. She does not currently take any medication specifically for migraines but occasionally uses ibuprofen or Tylenol to manage headaches, which are not as severe as past migraines. She mentions that there is no family history of diabetes. She recently underwent an STD test and is considering retesting. 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 She has three children, with the oldest being 16, the middle child 13, and the youngest turning 3 in July.    Review of Systems   Generally doing well - is mainly a single parent        Objective    Physical Exam  "  /72 (BP Location: Left arm, Patient Position: Sitting, Cuff Size: Adult Regular)   Pulse 83   Temp 98.4  F (36.9  C) (Oral)   Resp 12   Ht 1.651 m (5' 5\")   Wt 59.1 kg (130 lb 6.4 oz)   LMP  (LMP Unknown)   SpO2 99%   BMI 21.70 kg/m       Vitals stable  Well nourished and in no distress    Physical Exam  Test results:  - STD test performed previously, patient advised to retest in a month (around June 19, 2025)  - Blood work including HIV and syphilis tests ordered  Recent gonorrhea - agrees to re-test in 4 weeks    Letter written at patient request    UTD with PAP.    "

## 2025-06-09 ENCOUNTER — RESULTS FOLLOW-UP (OUTPATIENT)
Dept: FAMILY MEDICINE | Facility: CLINIC | Age: 35
End: 2025-06-09

## 2025-06-09 ENCOUNTER — OFFICE VISIT (OUTPATIENT)
Dept: FAMILY MEDICINE | Facility: CLINIC | Age: 35
End: 2025-06-09
Payer: COMMERCIAL

## 2025-06-09 VITALS
BODY MASS INDEX: 22.23 KG/M2 | TEMPERATURE: 98.6 F | DIASTOLIC BLOOD PRESSURE: 87 MMHG | SYSTOLIC BLOOD PRESSURE: 134 MMHG | HEIGHT: 65 IN | RESPIRATION RATE: 16 BRPM | WEIGHT: 133.4 LBS | HEART RATE: 58 BPM | OXYGEN SATURATION: 99 %

## 2025-06-09 DIAGNOSIS — N76.0 BACTERIAL VAGINOSIS: ICD-10-CM

## 2025-06-09 DIAGNOSIS — B96.89 BACTERIAL VAGINOSIS: ICD-10-CM

## 2025-06-09 DIAGNOSIS — R30.0 DYSURIA: Primary | ICD-10-CM

## 2025-06-09 LAB
ALBUMIN UR-MCNC: NEGATIVE MG/DL
APPEARANCE UR: CLEAR
BILIRUB UR QL STRIP: NEGATIVE
CLUE CELLS: PRESENT
COLOR UR AUTO: YELLOW
GLUCOSE UR STRIP-MCNC: NEGATIVE MG/DL
HGB UR QL STRIP: NEGATIVE
KETONES UR STRIP-MCNC: NEGATIVE MG/DL
LEUKOCYTE ESTERASE UR QL STRIP: NEGATIVE
NITRATE UR QL: NEGATIVE
PH UR STRIP: 6 [PH] (ref 5–8)
SP GR UR STRIP: 1.02 (ref 1–1.03)
TRICHOMONAS, WET PREP: ABNORMAL
UROBILINOGEN UR STRIP-ACNC: 0.2 E.U./DL
WBC'S/HIGH POWER FIELD, WET PREP: ABNORMAL
YEAST, WET PREP: ABNORMAL

## 2025-06-09 PROCEDURE — 99213 OFFICE O/P EST LOW 20 MIN: CPT | Mod: GC

## 2025-06-09 PROCEDURE — 3075F SYST BP GE 130 - 139MM HG: CPT

## 2025-06-09 PROCEDURE — 87210 SMEAR WET MOUNT SALINE/INK: CPT

## 2025-06-09 PROCEDURE — 3079F DIAST BP 80-89 MM HG: CPT

## 2025-06-09 PROCEDURE — 81003 URINALYSIS AUTO W/O SCOPE: CPT

## 2025-06-09 RX ORDER — METRONIDAZOLE 7.5 MG/G
1 GEL VAGINAL DAILY
Qty: 70 G | Refills: 0 | Status: SHIPPED | OUTPATIENT
Start: 2025-06-09 | End: 2025-06-16

## 2025-06-09 NOTE — PROGRESS NOTES
Preceptor Attestation:    I discussed the patient with the resident and evaluated the patient in person. I have verified the content of the note, which accurately reflects my assessment of the patient and the plan of care.   Supervising Physician:  Jose James MD.

## 2025-06-09 NOTE — PROGRESS NOTES
"  Assessment & Plan     Dysuria  4 days of urinary symptoms following recent treatment for both gonorrhea and BV. Negative PADMINI for gonorrhea on 6/1 and no new sexual partners since that time. Also negative for chlamydia at that time but positive for BV. She just completed metronidazole treatment course but now has these new symptoms. Differential includes cystitis, BV, STI or other vaginal irritation in light of recent infections. Will collect labs and treat as indicated.   - Urine Macroscopic with reflex to Microscopic  - Urine Macroscopic with reflex to Microscopic  - Wet preparation          Raffaele Whitman is a 34 year old, presenting for the following health issues:  UTI (Frequency and cramping. Started over the weekend., )        6/9/2025     2:20 PM   Additional Questions   Roomed by [email or web address]   Accompanied by self         6/9/2025    Information    services provided? No     HPI      Antonette Horton is a 34 year old female who presents today with complaints of urinary urgency, frequency and dysuria   This all started Friday (6/6)  Also having stomach cramping when peeing     She was recently diagnosed with gonorrhea but completed treatment course and had a negative test of cure at urgent care last week   While at  last week, she was diagnosed with BV and has also completed that treatment course   She was abstinent during her gonorrhea treatment course and denies any new sexual partners since that time     No discharge or itching, no fevers         Objective    /87   Pulse 58   Temp 98.6  F (37  C) (Oral)   Resp 16   Ht 1.651 m (5' 5\")   Wt 60.5 kg (133 lb 6.4 oz)   LMP  (LMP Unknown)   SpO2 99%   BMI 22.20 kg/m    Body mass index is 22.2 kg/m .  Physical Exam  Constitutional:       General: She is not in acute distress.     Appearance: Normal appearance. She is normal weight. She is not ill-appearing.   HENT:      Head: Normocephalic.   Pulmonary:      Effort: Pulmonary " effort is normal. No respiratory distress.   Skin:     General: Skin is warm and dry.   Neurological:      Mental Status: She is alert.   Psychiatric:         Mood and Affect: Mood normal.         Behavior: Behavior normal.         Thought Content: Thought content normal.         Judgment: Judgment normal.           Signed Electronically by: Rachael Cintron DO

## 2025-06-17 ENCOUNTER — OFFICE VISIT (OUTPATIENT)
Dept: URGENT CARE | Facility: URGENT CARE | Age: 35
End: 2025-06-17
Payer: COMMERCIAL

## 2025-06-17 VITALS
TEMPERATURE: 98.7 F | BODY MASS INDEX: 21.05 KG/M2 | DIASTOLIC BLOOD PRESSURE: 70 MMHG | SYSTOLIC BLOOD PRESSURE: 130 MMHG | HEART RATE: 74 BPM | WEIGHT: 131 LBS | OXYGEN SATURATION: 100 % | HEIGHT: 66 IN

## 2025-06-17 DIAGNOSIS — Z20.2 POSSIBLE EXPOSURE TO STI: ICD-10-CM

## 2025-06-17 DIAGNOSIS — Z20.2 EXPOSURE TO CHLAMYDIA: Primary | ICD-10-CM

## 2025-06-17 PROCEDURE — 99213 OFFICE O/P EST LOW 20 MIN: CPT | Mod: 25 | Performed by: FAMILY MEDICINE

## 2025-06-17 PROCEDURE — 87491 CHLMYD TRACH DNA AMP PROBE: CPT | Performed by: FAMILY MEDICINE

## 2025-06-17 PROCEDURE — 3078F DIAST BP <80 MM HG: CPT | Performed by: FAMILY MEDICINE

## 2025-06-17 PROCEDURE — 96372 THER/PROPH/DIAG INJ SC/IM: CPT | Performed by: FAMILY MEDICINE

## 2025-06-17 PROCEDURE — 87591 N.GONORRHOEAE DNA AMP PROB: CPT | Performed by: FAMILY MEDICINE

## 2025-06-17 PROCEDURE — 3075F SYST BP GE 130 - 139MM HG: CPT | Performed by: FAMILY MEDICINE

## 2025-06-17 RX ORDER — DOXYCYCLINE 100 MG/1
100 CAPSULE ORAL 2 TIMES DAILY
Qty: 14 CAPSULE | Refills: 0 | Status: SHIPPED | OUTPATIENT
Start: 2025-06-17 | End: 2025-06-24

## 2025-06-17 RX ORDER — CEFTRIAXONE SODIUM 1 G
500 VIAL (EA) INJECTION ONCE
Status: COMPLETED | OUTPATIENT
Start: 2025-06-17 | End: 2025-06-17

## 2025-06-17 RX ADMIN — Medication 500 MG: at 18:16

## 2025-06-17 ASSESSMENT — ENCOUNTER SYMPTOMS
DIFFICULTY URINATING: 0
FEVER: 0

## 2025-06-17 NOTE — NURSING NOTE
Clinic Administered Medication Documentation        Patient was given Rocephin. Prior to medication administration, verified patient's identity using patient s name and date of birth. Please see MAR and medication order for additional information. Patient instructed to remain in clinic for 15 minutes and report any adverse reaction to staff immediately.    Vial/Syringe: Single dose vial. Was entire vial of medication used? Yes  Olga Haro LPN

## 2025-06-17 NOTE — PROGRESS NOTES
"Assessment & Plan     Exposure to chlamydia  Recommend empiric treatment given high risk exposure.  Practice safe sex.  - Chlamydia & Gonorrhea by PCR, GICH/Range - Clinic Collect  - doxycycline hyclate (VIBRAMYCIN) 100 MG capsule  Dispense: 14 capsule; Refill: 0    Possible exposure to STI  Recommend empiric treatment given high risk exposure.  - cefTRIAXone (ROCEPHIN) in lidocaine 1% for IM administration only 500 mg             No follow-ups on file.    Jose Samuel MD  [facility] DOLLY Whitman is a 34 year old female who presents to clinic today for the following health issues:  Chief Complaint   Patient presents with    Urgent Care     Pt states that her ex partner tested positive of  chlamydia and the pt is wondering if she also has  too she is feeling abdominal pain         6/17/2025     5:42 PM   Additional Questions   Roomed by geovanna   Accompanied by self     HPI    Patient is a 34-year-old male who recently had to be seen 2 days ago with a partner who tested positive for chlamydia, gonorrhea she is requesting treatment for both.  Having some left lower abdominal pain, no difficulty urinating.  Is finishing up MetroGel for bacterial vaginosis.      Review of Systems   Constitutional:  Negative for fever.   Genitourinary:  Negative for difficulty urinating and vaginal discharge.           Objective    /70   Pulse 74   Temp 98.7  F (37.1  C) (Tympanic)   Ht 1.68 m (5' 6.14\")   Wt 59.4 kg (131 lb)   LMP  (LMP Unknown)   SpO2 100%   BMI 21.05 kg/m    Physical Exam  Vitals reviewed.   Abdominal:      General: Abdomen is flat. There is no distension.      Palpations: Abdomen is soft.      Tenderness: There is abdominal tenderness (Left lower quadrant, no guarding). There is no right CVA tenderness or left CVA tenderness.   Neurological:      Mental Status: She is alert.                  "

## 2025-06-18 ENCOUNTER — RESULTS FOLLOW-UP (OUTPATIENT)
Dept: URGENT CARE | Facility: URGENT CARE | Age: 35
End: 2025-06-18

## 2025-06-18 LAB
C TRACH DNA SPEC QL PROBE+SIG AMP: NEGATIVE
N GONORRHOEA DNA SPEC QL NAA+PROBE: NEGATIVE
SPECIMEN TYPE: NORMAL

## (undated) DEVICE — GLOVE BIOGEL PI MICRO SZ 7.5 48575

## (undated) DEVICE — SYR 50ML LL W/O NDL 309653

## (undated) DEVICE — SUCTION TIP YANKAUER W/O VENT K86

## (undated) DEVICE — SOL NACL 0.9% INJ 1000ML BAG 07983-09

## (undated) DEVICE — BOWL 32OZ STERILE 01232

## (undated) DEVICE — DECANTER BAG 2002S

## (undated) DEVICE — PACK MINOR SBA15MIFSE

## (undated) DEVICE — PAD CHUX UNDERPAD 23X24" 7136

## (undated) DEVICE — DRAPE BREAST/CHEST 29420

## (undated) DEVICE — BLADE KNIFE SURG 10 371110

## (undated) DEVICE — ESU PENCIL SMOKE EVAC W/ROCKER SWITCH 0703-047-000

## (undated) DEVICE — ESU ELEC BLADE 6" COATED/INSULATED E1455-6

## (undated) DEVICE — PREP CHLORAPREP 26ML TINTED HI-LITE ORANGE 930815

## (undated) DEVICE — SUCTION SLEEVE NEPTUNE 2 165MM 0703-005-165

## (undated) DEVICE — SU DERMABOND MINI DHVM12

## (undated) DEVICE — SOL WATER IRRIG 1000ML BOTTLE 07139-09

## (undated) DEVICE — GLOVE BIOGEL PI MICRO INDICATOR UNDERGLOVE SZ 8.0 48980

## (undated) DEVICE — DRAPE IOBAN INCISE 23X17" 6650EZ

## (undated) DEVICE — TUBING IRRIG TUR Y TYPE 96" LF 6543-01

## (undated) DEVICE — CONNECTOR STOPCOCK 3 WAY MALE LL HI-FLO MX9311L

## (undated) RX ORDER — ACETAMINOPHEN 325 MG/1
TABLET ORAL
Status: DISPENSED
Start: 2024-04-30

## (undated) RX ORDER — PROPOFOL 10 MG/ML
INJECTION, EMULSION INTRAVENOUS
Status: DISPENSED
Start: 2024-04-30

## (undated) RX ORDER — DEXAMETHASONE SODIUM PHOSPHATE 4 MG/ML
INJECTION, SOLUTION INTRA-ARTICULAR; INTRALESIONAL; INTRAMUSCULAR; INTRAVENOUS; SOFT TISSUE
Status: DISPENSED
Start: 2024-04-30

## (undated) RX ORDER — FENTANYL CITRATE 50 UG/ML
INJECTION, SOLUTION INTRAMUSCULAR; INTRAVENOUS
Status: DISPENSED
Start: 2024-04-30

## (undated) RX ORDER — DEXMEDETOMIDINE HYDROCHLORIDE 4 UG/ML
INJECTION, SOLUTION INTRAVENOUS
Status: DISPENSED
Start: 2024-04-30

## (undated) RX ORDER — CEFAZOLIN SODIUM 2 G/50ML
SOLUTION INTRAVENOUS
Status: DISPENSED
Start: 2024-04-30

## (undated) RX ORDER — CEFAZOLIN SODIUM 1 G/3ML
INJECTION, POWDER, FOR SOLUTION INTRAMUSCULAR; INTRAVENOUS
Status: DISPENSED
Start: 2024-04-30

## (undated) RX ORDER — BUPIVACAINE HYDROCHLORIDE 2.5 MG/ML
INJECTION, SOLUTION INFILTRATION; PERINEURAL
Status: DISPENSED
Start: 2024-04-30

## (undated) RX ORDER — ONDANSETRON 2 MG/ML
INJECTION INTRAMUSCULAR; INTRAVENOUS
Status: DISPENSED
Start: 2024-04-30